# Patient Record
Sex: MALE | Race: WHITE | Employment: FULL TIME | URBAN - METROPOLITAN AREA
[De-identification: names, ages, dates, MRNs, and addresses within clinical notes are randomized per-mention and may not be internally consistent; named-entity substitution may affect disease eponyms.]

---

## 2017-01-03 ENCOUNTER — CARE COORDINATION (OUTPATIENT)
Dept: SURGERY | Facility: CLINIC | Age: 42
End: 2017-01-03

## 2017-01-03 NOTE — PROGRESS NOTES
Returned call to patient regarding voicemail he had left reporting odor during dressing change. No other concerns and he reported in message that incision seemed to be healing well. Unable to reach patient and either number, message left for him to call back and discuss further.     Amanda Magana RN

## 2017-01-04 ENCOUNTER — CARE COORDINATION (OUTPATIENT)
Dept: SURGERY | Facility: CLINIC | Age: 42
End: 2017-01-04

## 2017-01-04 DIAGNOSIS — D3A.8 NEUROENDOCRINE TUMOR (H): Primary | ICD-10-CM

## 2017-01-04 NOTE — PROGRESS NOTES
"Called and left a message for patient to call back.    Called second number listed at home phone and spoke with patient. Overall doing well. No concerns at this time. He states he thinks he over ate yesterday and he felt very full after lunch and didn't eat much the rest of the day. He is feeling well today. Discussed report of \"odor\" coming from wound. He states the nurse had mentioned it and wanted him to let us know, no other concerns noted or report by nurse. Per patient report incision is healing well. No fevers reported. He continues to have reflux but omeprazole BID is continuing to provide relief. Provided refill per Dr. Armenta to patients pharmacy. Informed him we will plan to see him on Monday as scheduled and requested he inform the wound nurse that we will plan on removing the VAC in clinic to further evaluate and request that she come in the afternoon on Monday to possible replace if patient still needs VAC. He verbalized understanding and stated he will inform her today of the plan when she comes to complete the drsg change.     No other concerns at this time and he will call if any arise prior to appointment on Monday.     Amanda Magana RN     "

## 2017-01-05 ENCOUNTER — CARE COORDINATION (OUTPATIENT)
Dept: SURGERY | Facility: CLINIC | Age: 42
End: 2017-01-05

## 2017-01-05 NOTE — PROGRESS NOTES
Called and spoke with patients wife regarding her concerns. Per report she was concerned with one of the medications the patient is on has he has been very emotional over the past week. She states she read the bottle of the reglan medication and was concerned as it stated to call your doctor if you have feeling of depression and sadness, and the patient has had episodes of crying over the past week. Informed her its not unheard of for patients to get emotional after these procedures especially when they are young and normally very active. Informed her I would talk with Dr. Armenta regarding the medication and see if we are able to stop the medication or if he would like to continue if for the remained of the two weeks he as left.     Explained to patients wife the reason on why he is taking the medication as well.     Discussed with  and he would like patient to continue through the weekend until Monday when we are seeing him in clinic.    Called and spoke with patients wife and informed her that we would prefer he continue medication through Monday and that we can address this all when he is in clinic. She agreed with plan and did not have concerns having him continue medication. She also stated that patient had a very good day yesterday.     Amanda Magana RN

## 2017-01-09 ENCOUNTER — OFFICE VISIT (OUTPATIENT)
Dept: SURGERY | Facility: CLINIC | Age: 42
End: 2017-01-09
Attending: SURGERY
Payer: COMMERCIAL

## 2017-01-09 VITALS
OXYGEN SATURATION: 97 % | WEIGHT: 289.02 LBS | SYSTOLIC BLOOD PRESSURE: 121 MMHG | BODY MASS INDEX: 36.13 KG/M2 | RESPIRATION RATE: 16 BRPM | HEART RATE: 84 BPM | TEMPERATURE: 96.8 F | DIASTOLIC BLOOD PRESSURE: 81 MMHG

## 2017-01-09 DIAGNOSIS — D3A.8 NEUROENDOCRINE TUMOR (H): Primary | ICD-10-CM

## 2017-01-09 PROCEDURE — 99212 OFFICE O/P EST SF 10 MIN: CPT | Mod: ZF

## 2017-01-09 ASSESSMENT — PAIN SCALES - GENERAL: PAINLEVEL: MODERATE PAIN (4)

## 2017-01-09 NOTE — Clinical Note
1/9/2017       RE: [place] Guerrero  [address] [facility] [facility] [place] [address]     Dear Colleague,    Thank you for referring your patient, Joey Guerrero, to the [facility]. Please see a copy of my visit note below.    REASON FOR EVALUATION:  Postop Whipple.      HISTORY OF PRESENT ILLNESS:  Mr. 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  He had a Whipple procedure which went well and he essentially had no immediate surgical complications.  However, when his staples were removed prior to discharge, his wound skin opened up which required Wound VAC placement.  He has otherwise done very well.  His final pathology was consistent with pancreatic neuroendocrine tumor measuring 5.1 cm.  It was intermediate grade.  Resection margins were negative and 10 lymph nodes were also all negative.  There were a couple of additional lymph nodes also evaluated, neither of which revealed any evidence of malignancy.  Currently, Joey is doing well.  He is eating and maintaining his weight and actually is gaining some weight.  Therefore, his J tube was removed in clinic today.  His midline incision is essentially healed.  He does have an area of granulation tissue centrally but it is coming along very rapidly with Wound VAC dressing.  We will plan to do the Wound VAC for another week or 2 for completion.  With regard to medical oncology, Mr. Guerrero will be following up with our Medical Oncology group for ongoing surveillance given the large size and moderate grade of his neuroendocrine tumor.  I will plan on seeing Joey in a week or two just to check on his wound, but otherwise, he is doing great.  Also today because he is feeling well, we will start reducing his erythromycin and Reglan to see if we can get him off those now that he is eating better.      A total of 30 minutes was spent with Joey, all of which was in consultation with him and  his wife today.     Sincerely,    Matthias Armenta MD

## 2017-01-09 NOTE — MR AVS SNAPSHOT
After Visit Summary   1/9/2017    Joey Guerrero    MRN: [id number]           Patient Information     Date Of Birth          1975        Visit Information        Provider Department      1/9/2017 9:30 AM Matthias Armenta MD [facility]        Care Instructions    Below is a brief summary of your discussion and care plan from today's visit below.   ______________________________________________________________________    As discussed with Dr. Armenta we will proceed with the following:     - Continue wound vac for 2 weeks. We will see you back on 1/23/2017 at 1115 am.     - Decrease your erythromycin by half. If you feel more nausea start the original dose.     - Decrease the Reglan by half. If you feel more nausea start the original dose.     - Continue for your omeprazole, this medication you will continue for the rest of your life.     - You can stop your lovenox.   ______________________________________________________________________    It was a pleasure seeing you in clinic today - please be in touch if there are any further questions that arise following today's visit.  During business hours, you may reach my Clinic Coordinator at [phone].  For urgent/emergent questions after business hours, you may reach the on-call Surgery Resident by contacting the [facility]  at [phone].    Any benign/non-urgent test results are usually communicated via letter or [employer] message within 1-2 weeks after completion.  Urgent results (those that require a change in the previously-discussed care plan) are usually communicated via a phone call once available from our clinic staff to discuss the results and the next steps in your evaluation.    I recommend signing up for better. access if you have not already done so and are comfortable with using a computer.  This allows for online access to your lab results and also helps you communicate efficiently with my  "clinic should any questions arise in your care.      Sincerely,    Amanda Magana RN  Care Coordinator -   Phone: [phone]  Fax: [phone]              Follow-ups after your visit        Your next 10 appointments already scheduled     2017 11:00 AM   (Arrive by 10:45 AM)   New Patient Visit with Brandan Hanley MD   [facility] ([facility])    [address]  [address] [place] [address] [phone]              Who to contact     If you have questions or need follow up information about today's clinic visit or your schedule please contact [facility] directly at [phone].  Normal or non-critical lab and imaging results will be communicated to you by MyChart, letter or phone within 4 business days after the clinic has received the results. If you do not hear from us within 7 days, please contact the clinic through MyChart or phone. If you have a critical or abnormal lab result, we will notify you by phone as soon as possible.  Submit refill requests through [employer] or call your pharmacy and they will forward the refill request to us. Please allow 3 business days for your refill to be completed.          Additional Information About Your Visit        QranioharPushCall Information     [employer] lets you send messages to your doctor, view your test results, renew your prescriptions, schedule appointments and more. To sign up, go to [email or web address] . Click on \"Log in\" on the left side of the screen, which will take you to the Welcome page. Then click on \"Sign up Now\" on the right side of the page.     You will be asked to enter the access code listed below, as well as some personal information. Please follow the directions to create your username and password.     Your access code is: [email or web address]  Expires: 2017  5:49 PM     Your access code will  in 90 days. If you need help or a new code, please call your " [facility] or [phone].        Care EveryWhere ID     This is your Care EveryWhere ID. This could be used by other organizations to access your [place] medical records  [id number]        Your Vitals Were     Pulse Temperature Respirations Pulse Oximetry          84 96.8  F (36  C) (Oral) 16 97%         Blood Pressure from Last 3 Encounters:   01/09/17 121/81   12/21/16 121/76   12/05/16 124/82    Weight from Last 3 Encounters:   01/09/17 131.1 kg (289 lb 0.4 oz)   12/16/16 124.422 kg (274 lb 4.8 oz)   12/05/16 128.595 kg (283 lb 8 oz)              Today, you had the following     No orders found for display       Primary Care Provider Office Phone # Fax #    Ken Piedra [phone]       [place] [address] [address] [facility] [address]        Thank you!     Thank you for choosing [facility]  for your care. Our goal is always to provide you with excellent care. Hearing back from our patients is one way we can continue to improve our services. Please take a few minutes to complete the written survey that you may receive in the mail after your visit with us. Thank you!             Your Updated Medication List - Protect others around you: Learn how to safely use, store and throw away your medicines at www.disposemymeds.org.          This list is accurate as of: 1/9/17 10:24 AM.  Always use your most recent med list.                   Brand Name Dispense Instructions for use    albuterol 108 (90 BASE) MCG/ACT Inhaler    PROAIR HFA/PROVENTIL HFA/VENTOLIN HFA     Inhale 1-2 puffs into the lungs every 6 hours as needed for shortness of breath / dyspnea or wheezing       calcium carbonate 500 MG chewable tablet    TUMS    150 tablet    Take 1-2 tablets (500-1,000 mg) by mouth 3 times daily as needed for heartburn       cyclobenzaprine 10 MG tablet    FLEXERIL    21 tablet    Take 1 tablet (10 mg) by mouth 3 times daily as needed for muscle spasms       enoxaparin 40  MG/0.4ML injection    LOVENOX    11.2 mL    Inject 0.4 mLs (40 mg) Subcutaneous every 24 hours for 28 days       erythromycin 400 MG/5ML suspension    EES    117 mL    1.3 mLs (104 mg) by Per J Tube route 3 times daily       fluticasone 110 MCG/ACT Inhaler    FLOVENT HFA     Inhale 2 puffs into the lungs At Bedtime       metoclopramide 10 MG tablet    REGLAN    120 tablet    Take 1 tablet (10 mg) by mouth 4 times daily (before meals and nightly)       Multi-vitamin Tabs tablet      Take 1 tablet by mouth every morning       OMEGA-3 FISH OIL PO      Take 1 g by mouth 2 times daily (with meals) 3 capsules       omeprazole 20 MG CR capsule    priLOSEC    90 capsule    Take 1 capsule (20 mg) by mouth 2 times daily       oxyCODONE 5 MG IR tablet    ROXICODONE    30 tablet    Take 1-2 tablets (5-10 mg) by mouth every 4 hours as needed for moderate to severe pain       sucralfate 1 GM/10ML suspension    CARAFATE    1200 mL    Take 10 mLs (1 g) by mouth 4 times daily as needed       VENTOLIN IN      inhale  into the lungs.       vitamin B complex with vitamin C Tabs tablet      Take 1 tablet by mouth every morning       VITAMIN C PO      Take by mouth 2 times daily       VITAMIN D (CHOLECALCIFEROL) PO      Take by mouth every morning

## 2017-01-09 NOTE — PATIENT INSTRUCTIONS
Below is a brief summary of your discussion and care plan from today's visit below.   ______________________________________________________________________    As discussed with Dr. Armenta we will proceed with the following:     - Continue wound vac for 2 weeks. We will see you back on 1/23/2017 at 1115 am.     - Decrease your erythromycin by half. If you feel more nausea start the original dose.     - Decrease the Reglan by half. If you feel more nausea start the original dose.     - Continue for your omeprazole, this medication you will continue for the rest of your life.     - You can stop your lovenox.   ______________________________________________________________________    It was a pleasure seeing you in clinic today - please be in touch if there are any further questions that arise following today's visit.  During business hours, you may reach my Clinic Coordinator at [phone].  For urgent/emergent questions after business hours, you may reach the on-call Surgery Resident by contacting the [facility]  at [phone].    Any benign/non-urgent test results are usually communicated via letter or "[employer]hart message within 1-2 weeks after completion.  Urgent results (those that require a change in the previously-discussed care plan) are usually communicated via a phone call once available from our clinic staff to discuss the results and the next steps in your evaluation.    I recommend signing up for webme access if you have not already done so and are comfortable with using a computer.  This allows for online access to your lab results and also helps you communicate efficiently with my clinic should any questions arise in your care.      Sincerely,    Amanda Magana RN  Care Coordinator -   Phone: [phone]  Fax: [phone]

## 2017-01-09 NOTE — PROGRESS NOTES
REASON FOR EVALUATION:  Postop Whipple.      HISTORY OF PRESENT ILLNESS:  Mr. 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  He had a Whipple procedure which went well and he essentially had no immediate surgical complications.  However, when his staples were removed prior to discharge, his wound skin opened up which required Wound VAC placement.  He has otherwise done very well.  His final pathology was consistent with pancreatic neuroendocrine tumor measuring 5.1 cm.  It was intermediate grade.  Resection margins were negative and 10 lymph nodes were also all negative.  There were a couple of additional lymph nodes also evaluated, neither of which revealed any evidence of malignancy.  Currently, Joey is doing well.  He is eating and maintaining his weight and actually is gaining some weight.  Therefore, his J tube was removed in clinic today.  His midline incision is essentially healed.  He does have an area of granulation tissue centrally but it is coming along very rapidly with Wound VAC dressing.  We will plan to do the Wound VAC for another week or 2 for completion.  With regard to medical oncology, Mr. Guerrero will be following up with our Medical Oncology group for ongoing surveillance given the large size and moderate grade of his neuroendocrine tumor.  I will plan on seeing Joey in a week or two just to check on his wound, but otherwise, he is doing great.  Also today because he is feeling well, we will start reducing his erythromycin and Reglan to see if we can get him off those now that he is eating better.      A total of 30 minutes was spent with Joey, all of which was in consultation with him and his wife today.

## 2017-01-09 NOTE — NURSING NOTE
"Joey Guerrero is a 41 year old male who presents for:  Chief Complaint   Patient presents with     Oncology Clinic Visit     Return for Post-op        Initial Vitals:  /81 mmHg  Pulse 84  Temp(Src) 96.8  F (36  C) (Oral)  Resp 16  Wt 131.1 kg (289 lb 0.4 oz)  SpO2 97% Estimated body mass index is 36.13 kg/(m^2) as calculated from the following:    Height as of 12/8/16: 1.905 m (6' 3\").    Weight as of this encounter: 131.1 kg (289 lb 0.4 oz).. There is no height on file to calculate BSA. BP completed using cuff size: large  Moderate Pain (4) No LMP for male patient. Allergies and medications reviewed.     Medications: Medication refills not needed today.  Pharmacy name entered into [employer]:    [facility] [address] [place], [place] - [address] [facility] [address] [place], [place] - [address] [address] [facility] [address]    Comments:     5  minutes for nursing intake (face to face time)   Suze Mcgill MA          "

## 2017-01-18 ENCOUNTER — CARE COORDINATION (OUTPATIENT)
Dept: SURGERY | Facility: CLINIC | Age: 42
End: 2017-01-18

## 2017-01-18 NOTE — PROGRESS NOTES
Called and spoke with patient regarding questions. He reports he has stopped the reglan and the erythromycin as well as the Carafate. He states he has notice a dull pain in his abdomen since stopping medications. He states food seems to be moving through fine and he is not having any issues with nausea. Informed him to restart the Carafate for now to see if that helps as he had bad reflux following surgery. Informed him we can further assess at his visit on Monday if pain does not improve. He agreed with plan and has no further questions.     Amanda Magana RN

## 2017-01-23 ENCOUNTER — OFFICE VISIT (OUTPATIENT)
Dept: SURGERY | Facility: CLINIC | Age: 42
End: 2017-01-23
Attending: SURGERY
Payer: COMMERCIAL

## 2017-01-23 VITALS
TEMPERATURE: 97.9 F | HEIGHT: 75 IN | OXYGEN SATURATION: 97 % | BODY MASS INDEX: 30.96 KG/M2 | HEART RATE: 65 BPM | RESPIRATION RATE: 20 BRPM | DIASTOLIC BLOOD PRESSURE: 80 MMHG | SYSTOLIC BLOOD PRESSURE: 117 MMHG | WEIGHT: 249 LBS

## 2017-01-23 DIAGNOSIS — D3A.8 NEUROENDOCRINE TUMOR (H): Primary | ICD-10-CM

## 2017-01-23 PROCEDURE — 99212 OFFICE O/P EST SF 10 MIN: CPT

## 2017-01-23 NOTE — Clinical Note
Can you schedule this patient for follow up with wilder on 2/20 and call him with appointment info.   Thanks,  Amanda

## 2017-01-23 NOTE — PROGRESS NOTES
Looks great  Incision healing well  Small area of granulation tissue remains - telfa and ointment placed - no need for vac any more  Doing wel with diet but occasionally having bloating/pain if eats too much - counselled on 4-6 small meals per day    FU in 1 month to be sure of healing and diet improvement

## 2017-01-23 NOTE — Clinical Note
1/23/2017       RE: Joey Guerrero  [address] [place] [address]     Dear Colleague,    Thank you for referring your patient, Joey Guerrero, to the [facility]. Please see a copy of my visit note below.    Looks great  Incision healing well  Small area of granulation tissue remains - telfa and ointment placed - no need for vac any more  Doing wel with diet but occasionally having bloating/pain if eats too much - counselled on 4-6 small meals per day    FU in 1 month to be sure of healing and diet improvement    Again, thank you for allowing me to participate in the care of your patient.      Sincerely,    Matthias Armenta MD

## 2017-01-23 NOTE — Clinical Note
[facility]  [address]  [address]  [facility] [place] [address]  [phone]        January 23, 2017    Regarding:  Joey Guerrero  [address] [place] [address]              To Whom It May Concern;    Mr. Guerrero has been under my professional care following surgical intervention on 12/8/2016. Mr. Guerrero was seen in the office today, 1/23/2016, for continued follow up after surgery. Due to post op healing complications he is unable to return to work on light duty at this time. It is my recommendation that he doesn't return to work until he has completely recovered and is able to return full time.     We will continue to follow up with Mr. Guerrero throughout this recovery to ensure he is ready and it is safe for him to return to work.     If you have any questions or need additional information or paperwork please contact my nurse Amanda at [phone] or fax paperwork to [phone].         Sincerely,        Matthias Armenta MD

## 2017-01-23 NOTE — NURSING NOTE
"Joey Guerrero is a 41 year old male who presents for:  Chief Complaint   Patient presents with     Oncology Clinic Visit     FU post op appointment        Initial Vitals:  /80 mmHg  Pulse 65  Temp(Src) 97.9  F (36.6  C) (Oral)  Resp 20  Ht 1.905 m (6' 3\")  Wt 112.946 kg (249 lb)  BMI 31.12 kg/m2  SpO2 97% Estimated body mass index is 31.12 kg/(m^2) as calculated from the following:    Height as of this encounter: 1.905 m (6' 3\").    Weight as of this encounter: 112.946 kg (249 lb).. Body surface area is 2.44 meters squared. BP completed using cuff size: large  Data Unavailable No LMP for male patient. Allergies and medications reviewed.     Medications: Medication refills not needed today.  Pharmacy name entered into [employer]:    [facility] [address] [place], [place] - [address] [facility] [address] [place], [place] - [address] [address] AT [facility] [address]    Comments:     6 minutes for nursing intake (face to face time)   Roxy Lainez CMA          "

## 2017-01-27 ENCOUNTER — DOCUMENTATION ONLY (OUTPATIENT)
Dept: CARE COORDINATION | Facility: CLINIC | Age: 42
End: 2017-01-27

## 2017-01-28 NOTE — PROGRESS NOTES
[facility] now requests orders and shares plan of care/discharge summaries for some patients through [employer].  Please REPLY TO THIS MESSAGE in order to give authorization for orders when needed.  This is considered a verbal order, you will still receive a faxed copy of orders for signature.  Thank you for your assistance in improving collaboration for our patients.    DISCHARGE SUMMARY  Discharge Summary from 1/27/17  Most Goals Met  Patient Status Improved 1. Independent with ADLs and most IADLs. Patient is driving to shops close to                                                home                                      2. Able to manage own wound cares                                      3. Wound VAC and PEG tube discontinued                                       3. Maintains full liquid, blander diet, introducing foods back into diet slowly                                      4. Understands purpose and S/E of medications. Patient able to administer on own                                      5. No falls reported. Gait is steady                                      6. Independent with self care management and verbalizes the abillity to have a life                                               style to manage disease process    Discharge instructions given to patient    Medication schedule to remain as directed    Taught 1. Nutrition and hydration High protein diet to assist with wound healing, introduction to foods                      slowly             2. Home safety including safety with ADLs/IADLs             3. Wound cares and assessments and what to do if observe abnormal finding             4. Emergency care plan and pertinent phone numbers to be kept near telephone    Follow up with Oncology 2/6/17 and Surgeon March 2017  Physician notified of discharge

## 2017-02-06 ENCOUNTER — ONCOLOGY VISIT (OUTPATIENT)
Dept: ONCOLOGY | Facility: CLINIC | Age: 42
End: 2017-02-06
Attending: INTERNAL MEDICINE
Payer: COMMERCIAL

## 2017-02-06 VITALS
BODY MASS INDEX: 32.26 KG/M2 | RESPIRATION RATE: 12 BRPM | HEART RATE: 81 BPM | SYSTOLIC BLOOD PRESSURE: 122 MMHG | HEIGHT: 74 IN | TEMPERATURE: 97.5 F | WEIGHT: 251.4 LBS | OXYGEN SATURATION: 96 % | DIASTOLIC BLOOD PRESSURE: 86 MMHG

## 2017-02-06 DIAGNOSIS — D13.7 INSULINOMA: Primary | ICD-10-CM

## 2017-02-06 PROCEDURE — 99205 OFFICE O/P NEW HI 60 MIN: CPT | Mod: ZP | Performed by: INTERNAL MEDICINE

## 2017-02-06 PROCEDURE — 99358 PROLONG SERVICE W/O CONTACT: CPT | Performed by: INTERNAL MEDICINE

## 2017-02-06 PROCEDURE — 99212 OFFICE O/P EST SF 10 MIN: CPT

## 2017-02-06 ASSESSMENT — PAIN SCALES - GENERAL: PAINLEVEL: MILD PAIN (3)

## 2017-02-06 NOTE — PROGRESS NOTES
NEW PATIENT VISIT    NAME: Joey Guerrero      DATE: 02/06/2017    MRN: [id number]  REFERRING PHYSICIAN: Dr. Geovany Piedra (PCP)  CC/PATIENT ID: Newly diagnosed insulinoma, s/p whipple  HPI:  Mr. 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  He states that about 3 years ago he started to have symptoms of confusion and vague neurologic auras.  These were worse after eating. He was initially told that he had migraines but did not have improvement with migraine treatment.  He tells me that he has a relative who is family physician who recommend that he have an endocrinology evaluation.  He eventually was referred to an endocrinologist through [facility]. He was initially told that he had reactive hypoglycemia.  Testing showed he had elevated insulin levels so MRI abdomen was performed which found to have a tumor on the head of the pancreas.  Biopsy showed this to be a neuroendocrine tumor.  Pre-operative octreotide scan did not show any uptake.    On 12/8/2016,  he underwent a Whipple resection by Dr. Armenta. He had negative margins and no evidence of metastasis. Pathology is below.  He comes in today by himself. He reports that he is still having a fair amount of pain since the surgery in his abdomen.  This is fairly constant but is much worse after eating.  He feels that this is somewhat improving though still bothersome. He denies nausea/vomiting.  He has not had any further episodes of symptoms of hypoglycemia.    PAST MEDICAL HISTORY:  Past Medical History   Diagnosis Date     Unspecified asthma(493.90)      Pancreatic tumor      Uncomplicated asthma      Migraine      Tinea versicolor      Hypoglycemia      Ruptured appendix        PAST SURGICAL HISTORY:     Past Surgical History   Procedure Laterality Date     Picc insertion       Appendectomy       Endoscopic ultrasound upper  gastrointestinal tract (gi) N/A 11/18/2016     Procedure: ENDOSCOPIC ULTRASOUND, ESOPHAGOSCOPY / UPPER GASTROINTESTINAL TRACT (GI);  Surgeon: Guru Malathi Canas MD;  Location:  OR     Whipple procedure N/A 12/8/2016     Procedure: WHIPPLE PROCEDURE;  Surgeon: Matthias Armenta MD;  Location:  OR       FAMILY HISTORY:  He denies any family history of malignancy. He states his sister has fibromyalgia but is otherwise healthy.  SH: He lives in [place], [place]. He works as a [employer]. He is . He has 2 boys ages 4 and 8. He chewed tobacco up until his surgery he stopped this. He rarely smoked at a younger age. He previously drank alcohol socially but has not for some time as this has worsened his symptoms.  He denies other drug use.          MEDS:        Current Outpatient Prescriptions on File Prior to Visit:  omeprazole (PRILOSEC) 20 MG CR capsule Take 1 capsule (20 mg) by mouth 2 times daily   calcium carbonate (TUMS) 500 MG chewable tablet Take 1-2 tablets (500-1,000 mg) by mouth 3 times daily as needed for heartburn   Ascorbic Acid (VITAMIN C PO) Take by mouth 2 times daily    albuterol (PROAIR HFA, PROVENTIL HFA, VENTOLIN HFA) 108 (90 BASE) MCG/ACT inhaler Inhale 1-2 puffs into the lungs every 6 hours as needed for shortness of breath / dyspnea or wheezing   fluticasone (FLOVENT HFA) 110 MCG/ACT inhaler Inhale 2 puffs into the lungs At Bedtime    multivitamin, therapeutic with minerals (MULTI-VITAMIN) TABS Take 1 tablet by mouth every morning    Omega-3 Fatty Acids (OMEGA-3 FISH OIL PO) Take 1 g by mouth 2 times daily (with meals) 3 capsules   Albuterol Sulfate (VENTOLIN IN) inhale  into the lungs.     No current facility-administered medications on file prior to visit.        ALLERGIES:     Allergies   Allergen Reactions     Pollen Extract      Other reaction(s): Runny Nose     Cat Hair Extract      Other reaction(s): Runny Nose     ROS: FULL 10-PT ROS performed, pertinent positives  "are noted above.  PHYSICAL EXAM:  VITALS /86 mmHg  Pulse 81  Temp(Src) 97.5  F (36.4  C) (Oral)  Resp 12  Ht 1.878 m (6' 1.94\")  Wt 114.034 kg (251 lb 6.4 oz)  BMI 32.33 kg/m2  SpO2 96%  KPS: 90%  Gen: alert and oriented x 3. NAD.  Eyes: ancicteric  Mouth: clear  Neck: supple  Heart: RRR. No mgr.  Lungs: clear  Abd: well healed surgical scar, no sign of infection, slightly tender over the scar, no masses, BS+  Lower ext: well perfused, no edema  Skin: no rash  Neuro: grossly non-focal  LABS REVIEWED ON DAY OF VISIT  Labs from  (Dr. Pruitt)  Called to review 72 hour fast results.   (glucose) Dropped to 49 and later to 41 at 0900 on 10/16.   Insulin 2.2, 3.5  C-peptide 0.7, 0.7   Pro-insulin <7.5, 9.6  Beta-hydroxybutarate 0.31, 0.55    Change after glucagon 45 points. He did drop from 86 to 67 before d/c.       Dr. Pruitt on 11/3:  Testing for prolactin and iPTH normal, no MEN 1.    RADIOLOGY:          MRI Abdomen 10/31/2016  Impression:   1. Enhancing 3.9 cm mass in the pancreas with central necrosis. Given   the patient's history, this would be most suspicious for a newer   endocrine tumor such as insulinoma. This mass encases approximately   50% of the adjacent SMV, which remains patent. No other vascular   involvement.  2. Remainder of the exam is negative.    Octreotide scan 12/1/2016  Impression:     The 3D fused images as well as whole-body images demonstrates normal  distribution of the octreotide. There is no evidence of metastatic  disease or tumor. Of note, the known pancreatic malignancy does not  demonstrate radiotracer uptake.      Pathology       Copath Report     Patient Name: YESENIA TORRES   MR#: [id number]   Specimen #: [id number]   Collected: 12/8/2016   Received: 12/8/2016   Reported: 12/14/2016 12:11   Ordering Phy(s): LUKAS LOZA     For improved result formatting, select 'View Enhanced Report Format'   under Linked Documents section.     SPECIMEN(S):   A: Gallbladder   B: " Whipple   C: Duodenum   D: Lymph node, portal   E: Distal stomach     FINAL DIAGNOSIS:   A. GALLBLADDER, CHOLECYSTECTOMY:   - Gallbladder with no diagnostic abnormality     B. PANCREAS, DUODENUM, WHIPPLE RESECTION:   - Pancreatic neuroendocrine tumor (5.1 cm), intermediate-grade (G2)   - Surgical resection margins: Negative (tumor is 0.1 cm from the SMV   groove and 0.3 cm to uncinate margins)   - Ten lymph nodes with no evidence of metastasis (0/10)     C. DUODENUM, PARTIAL RESECTION:   - Segment of small bowel with extensive mucosal necrosis   - Resection margins: one with extensive necrosis and one with serositis   only     D. LYMPH NODE, PORTAL, BIOPSY:   - One lymph node with no evidence of metastatic tumor (0/1).     E. STOMACH, DISTAL, PARTIAL RESECTION:   - Segment of stomach with focal acute serositis   - One lymph node with no evidence of metastatic tumor(0/1)     COMMENT:   Immunohistochemical staining for Ki-67 is performed with appropriate   control. The proliferation index in some areas of tumor is upto 5% which   supports the above diagnosis.     Report Name: Pancreas NET   Status: Submitted     Part(s) Involved:   B: Ann     Synoptic Report:     CLINICAL     Clinical History:         - Hypoglycemic syndrome     Functional Type:         - Pancreatic neuroendocrine tumor, functional     SPECIMEN     Specimen:         - Head of pancreas     Procedure:         - Pancreaticoduodenectomy (Whipple resection), partial   pancreatectomy     TUMOR     Primary Tumor Site:         - Pancreatic head     Histologic Type and Grade:         - Well-differentiated neuroendocrine tumor; G2: Intermediate grade     Tumor Size: 5.1 cm     Tumor Focality:         - Unifocal     Tumor Extent       Sites(s) of Direct Extent of Tumor:           - Uncinate process       Microscopic Tumor Extension:           - Tumor is confined to pancreas     Accessory Tumor Findings       Mitotic Rate:           - 2-20 mitoses / 10 HPF  "        Mitoses Per 10 HPF: 4       Lymph-Vascular Invasion:           - Not identified       Perineural Invasion:           - Not identified     MARGINS     Margin Summary: All margins uninvolved by tumor       Distance of Tumor From Closest Margin: 1 mm       Margin: SMV groove margin     Pancreatic Neck / Parenchymal Margin:         - Uninvolved by tumor     Uncinate (Retroperitoneal / Superior Mesenteric Artery) Margin:         - Uninvolved by tumor     Bile Duct Margin:         - Uninvolved by tumor     Proximal Margin (Gastric or Duodenal):         - Uninvolved by tumor     Distal Margin (Distal Duodenal or Jejunal):         - Uninvolved by tumor     LYMPH NODES     Number of Lymph Nodes Examined: 12     Lymph Node Involvement: None identified     STAGE (PTNM)     Primary Tumor (pT):         - pT2: Tumor limited to the pancreas, more than 2 cm in greatest   dimension     Regional Lymph Nodes (pN):         - pN0: No regional lymph node metastasis     CAP eCC March 2016 Annual Release     I have personally reviewed all specimens and or slides, including the   listed special stains, and used them with my medical judgement to   determine the final diagnosis.     Electronically signed out by:     Marjan Vallecillo M.D., [facility]     CLINICAL HISTORY:   41-year-old white  with h/o uncomplicated asthma, migraine   is being evaluated for new onset hypoglycemia and a 4 cm pancreatic mass   suspicious for neuroendocrine tumor of the pancreas.     GROSS:   A: The specimen is received in formalin with proper patient   identification, labeled \"gallbladder\".  The specimen consists of a 12.7   cm in length intact gallbladder that ranges in diameter from 2.7 cm at   the neck to 5.3 cm at the fundus.  The serosa is green-gray, smooth, and   glistening.  There is a moderate amount of green-brown viscous bile.  No   choleliths identified within the container or the specimen.  The mucosa   is green-yellow, velvety, " "and has an average wall thickness of 0.1 cm.   No masses are grossly identified.  Representative sections, including en   face cystic duct margin, are submitted in cassette A1.     B: The specimen is received fresh with proper patient identification,   labeled \"Whipple\".  The specimen consists of pancreatoduodenectomy to   include a pancreas (8.1 x 7.8 x 4.5 cm) and  duodenum (9.0 cm length x   4.1 cm in diameter).  The anterior resection margin is inked yellow, the   posterior margin is inked black, the bile duct margin is inked green,   the pancreatic neck margin is inked red, the SMV groove is inked blue,   and uncinate is inked orange.     Upon sectioning, there is a 5.1 x 4.2 x 2.6 cm tan-brown, soft, friable,   and well-defined mass.  The mass abuts the anterior resection margin and   abuts the SMV groove.  The mass is 0.3 cm from the nearest uncinate   margin, 1.1 cm from the pancreatic neck margin, 2.1 cm from the   posterior, 2.4 cm from the bile duct margin, 6.5 cm from the proximal   duodenal margin, and 9.2 cm from the distal duodenal resection margin.   No other masses are grossly identified.  The remaining pancreatic   parenchyma is tan-yellow, lobulated, and unremarkable.  The duodenal   mucosa is tan-brown with regular intestinal folds.  No masses are   grossly identified.  Five lymph nodes that range from 0.5-1.1 cm   greatest dimension are identified. Representative sections are   submitted.     Summary of Sections:   B1 - pancreatic neck margin, frozen section remnant   B2 - bile duct margin, frozen section remnant   B3 - mass to anterior resection margin   B4 - mass to pancreatic neck margin   B5 - mass to SMV groove   B6 - mass to uncinate margin   B7 - mass   B8 - grossly unremarkable parenchyma   B9 - proximal duodenal resection margin   B10 - distal duodenal resection margin   B11 - duodenal mucosa   B12-B13 - duodenal fibroadipose tissue   B14 - two intact anterior lymph nodes   B15 - three " "intact posterior lymph nodes     C: The specimen is received in formalin with proper patient   identification, labeled \"duodenum\".  The specimen consists of 19.5 cm   length x 3.7 cm in diameter portion of small bowel.  At one end of the   specimen, the serosa is tan-gray and slightly ragged.  The remaining   serosa is grey-green and dusky.  At the ragged and of the specimen,   there is a thin strip of tan-pink grossly unremarkable the os.  The   remaining mucosa is gray-green and dusky.  No masses, ulcerations, or   polyps are grossly identified.  Representative sections are submitted.     Summary of Sections:   C1 - ragged resection margin   C2 - opposite resection margin   C3 - mucosa     D: The specimen is received in formalin with proper patient   identification, labeled \"portal lymph node\".  The specimen consists of a   single 1.9 x 1.2 x 0.8 cm lymph node is identified.  The lymph node is   entirely submitted in cassette D1.     E: The specimen is received in formalin with proper patient   identification, labeled \"distal stomach\".  The specimen consists of a   7.2 x 2.5 x 1.7 and partial gastrectomy with attached (10.7 x 6.0 x 1.9   cm) unremarkable adipose tissue.  The serosa is tan-pink, smooth, and   glistening.  The mucosa is tan-pink and unremarkable.  No masses are   grossly identified.  A single 1.0 x 0.6 x 0.3 cm lymph node is   identified.  Representative sections are submitted.     Summary of Sections:   E1 - resection margin   E2 - opposite end resection margin   E3 - mucosa   E4 - one intact lymph node (Dictated by: Shoshana Fernandez 12/9/2016   04:37 PM)     INTRAOPERATIVE CONSULTATION:   BFS1: Pancreatic neck margin:   \"No evidence of carcinoma\"     BFS2: Bile duct margin:   \"No evidence of carcinoma\" (Marjan Vallecillo M.D.)     MICROSCOPIC:   Microscopic examination is performed.     CPT Codes:   A: 38538-YW8   B: 90494-NR2, 25985-UU, 83177-MGQ, 60575-RCO   C: 22204-GK4   D: 69446-VS1   E: 66354-XB6 "     TESTING LAB LOCATION:   [facility], [address]   [id number] [phone]     COLLECTION SITE:   Client: [facility]   Location: [facility])          IMPRESSION/PLAN:  1. 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 We reviewed with him that surgery is generally curative of this disease but we still need to have close surveillance for recurrence. We reviewed the pathology with him in great detail and explained that he overall has intermediate risk factors - though this overall has a good prognosis. He would like to transfer his care to the [facility]. We will make a referral to our endocrinology team.  He asked about the etiology of the tumor. We explained that a subset of these are associated with genetic syndromes though many of them are not hereditary. We offered him a referral to genetics as this may have implications on his children - though would not alter our approach to his current treatment.  We will plan on having him come back in about 4 months with a repeat MRI and labs.  There is no clear guidance on the need for following hormonal levels.  We will see if our endocrinology colleagues have any input into this.  We will plan on using chromagranin A screening and unless directed otherwise will only repeat insulin levels if he has recurrent symptoms.  During the first year we will plan on seeing  him every 4 months.  We will then transition to every 6 months and then likely yearly over the next several years.  He asked about the post-operative pain and symptoms he is having. I am not sure if this will continue to improve with healing. We recommend that he do a trial of Creon in the case this might help.  If he does not have improvement with a week of this then he could stop and discuss this further with  Dr. Armenta about what to expect post-operatively.      Treatment plan:  - surveillance, MRI/labs in 4 months  - Genetics if desired  - trial of Creon  RTC in: 4 months    I spent > 60 minutes in consultation including history, physical, and 45 minutes of discussion.   Over 50% of the time was spent counseling and coordinating care.  I spent 60 minutes/hours reviewing the patient s medical records, images, imaging reports, and outside clinic records and lab values.   Patient was seen with and plan of care developed with Dr. Hanley.    Von Menjivar MD  Heme/Onc Fellow

## 2017-02-06 NOTE — Clinical Note
2/6/2017       RE: Joey Guerrero  [address]  [facility] [address]     Dear Colleague,    Thank you for referring your patient, Joey Guerrero, to the [facility]. Please see a copy of my visit note below.    MEDICAL ONCOLOGY NEW PATIENT VISIT NOTE      REFERRING PHYSICIAN:  Dr. Matthias Armenta, Hepatobiliary Surgery, [facility].      PATIENT ID:  [id number] Pancreatic head insulinoma.  No evidence of metastatic disease.      HISTORY OF PRESENT ILLNESS:  Mr. Joey Guerrero is a 41-year-old  gentleman from [place], [place].  He was recently diagnosed with pancreatic head insulinoma.      Over a 2-3 year period, beginning in 2014 or so, he was having hypoglycemic episodes of unexplained origin.  He was even hospitalized for one such episode in the fall of 2016.  He met with a primary care physician, and subsequently an endocrinologist.  There was concern for hypoglycemic cause and concern for potential insulinoma.  On 10/31/2016, MRI at [facility] revealed a 3.9 x 3.7 x 3.6 cm mass in the head of the pancreas.  There was involvement of the superior mesenteric vein, but no evidence of metastatic disease noted.  He met with Dr. Migel Sena at [facility], and a referral was made for surgical evaluation.      On 11/11/2016, a CT abdomen and pelvis was performed revealing an enhancing lesion with a cystic component versus central necrosis in the head and the uncinate process of the pancreas.  There was an indeterminate portacaval lymph node measuring 2.1 cm.  Otherwise, no evidence of suspicious adenopathy in the abdomen.  EUS was performed by Dr. Guru Canas at the [facility] on 11/18/2016.  He performed an FNA.  This revealed the tumor was composed of grade 1 neuroendocrine tumor with a low proliferation marker of Ki-67 less than 2%.  The tumor was positive for synaptophysin, chromogranin and CD56.      On 12/01/2016, the patient underwent a preoperative  octreotide scan that did not show any evidence of metastatic disease or tumor.  There was no radiotracer uptake.  A baseline chromogranin A was 70.  On 12/05/2016, the patient met with Dr. Matthias Armenta, who took the patient to the operating room on 12/08/2016 to perform a Whipple.  The pancreaticoduodenectomy resulted in the revealing of a pancreatic neuroendocrine tumor at the head of the pancreas that measured 5.1 cm with intermediate grade G2.  Margins were negative by 0.1 cm.  Zero of 12 lymph nodes were involved.  The Ki-67 registered at 5% with 4 mitoses/10 high-power fields.  There was no perivascular, nor perineural invasion.  The pathologic stage was T2.  The pathologic N stage was N0, and M0 per the prior octreotide scan.  The patient is currently 9 weeks out from surgery.      He presents today alone for evaluation upon the kind referral of Dr. Armenta and also Dr. Canas.  He characterizes epigastric and stomach pain that is worsened upon eating.  He has not been on Creon supplementation thus yet.  He otherwise is doing well.  He has not yet returned to his job as a  in [place].      PAST MEDICAL HISTORY:  Pancreatic neuroendocrine tumor, pathologic T2 N0 M0 per above, status post Whipple surgery on 12/08/2016.  Other surgeries notable include an appendectomy in 2007.      FAMILY HISTORY:  Denies any family history of malignancy.      SOCIAL HISTORY:  The patient lives in [place], [place], with his wife.  They have 2 boys, ages 8 and 4.  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    Tobacco:  Used to chew and smoke occasional cigarettes since the age of 18 until recently.    Alcohol:  Denies alcohol abuse.    Drugs:  Denies illicit drug use.      MEDICATIONS:  Fully reviewed in Epic.   Current Outpatient Prescriptions   Medication     amylase-lipase-protease (CREON 6) 6000 UNITS CPEP     omeprazole (PRILOSEC) 20 MG CR capsule     calcium carbonate (TUMS) 500 MG  "chewable tablet     Ascorbic Acid (VITAMIN C PO)     albuterol (PROAIR HFA, PROVENTIL HFA, VENTOLIN HFA) 108 (90 BASE) MCG/ACT inhaler     fluticasone (FLOVENT HFA) 110 MCG/ACT inhaler     multivitamin, therapeutic with minerals (MULTI-VITAMIN) TABS     Omega-3 Fatty Acids (OMEGA-3 FISH OIL PO)     Albuterol Sulfate (VENTOLIN IN)     No current facility-administered medications for this visit.          ALLERGIES:  No known drug allergies.      REVIEW OF SYSTEMS:  Full 10-point review of systems was performed.  Pertinent symptoms are reviewed above per HPI.      PHYSICAL EXAM:   /86 mmHg  Pulse 81  Temp(Src) 97.5  F (36.4  C) (Oral)  Resp 12  Ht 1.878 m (6' 1.94\")  Wt 114.034 kg (251 lb 6.4 oz)  BMI 32.33 kg/m2  SpO2 96%    KPS:  90%.   GENERAL:  Very pleasant young adult  gentleman, in no acute distress, alert and oriented x3.   HEENT:  Anicteric sclerae. PERRLA, oropharynx clear with no mucositis or thrush.   LYMPH NODES:  No palpable pre/post-auricular, cervical, axillary, or inguinal lymphadenopathy appreciated.   CV:  RRR, normal S1 S2.  No murmurs, gallops, or rubs.   LUNGS:  Clear to auscultation bilaterally.  No dullness to percussion.   ABDOMEN:  Soft, nontender and nondistended.  Bowel sounds heard x4.  No apparent hepatosplenomegaly.    Broad/long midline scar.  No overlying fluctuance, erythema or tenderness, healing well.   EXTREMITIES:  No clubbing, cyanosis, or edema.   NEUROLOGIC:  Cranial Nerves II-XII grossly intact.  Motor strength and sensation grossly intact.       LABORATORY DATA:  Reviewed on the day of visit, including the preoperative serum chromogranin A of 70 on 12/06/2016.   Results for orders placed or performed during the hospital encounter of 12/08/16   XR Abdomen Port 1 View    Narrative    EXAM: XR ABDOMEN PORT F1 VW  12/11/2016 1:14 PM      HISTORY: assess NG position    COMPARISON: CT 11/11/2016    FINDINGS: Gastric tube sidehole projects just above " the  gastroesophageal junction with the tip projecting over the fundus.  Biliary and pancreatic stents. Surgical drain in the right upper  quadrant. Scattered surgical clips and midline staples. Percutaneous  jejunostomy tube. No abnormally dilated air-filled loops of large or  small bowel. No acute osseous and amounted.       Impression    IMPRESSION: Gastric tube sidehole projects just above the  gastroesophageal junction. Consider advancing.    I have personally reviewed the examination and initial interpretation  and I agree with the findings.    LUKAS MARX MD   XR Abdomen Port 1 View   Result Value Ref Range    Radiologist flags Nasogastric tube tip position (Urgent)     Narrative    Examination: XR ABDOMEN PORT F1 VW, 12/12/2016 11:08 AM    Comparison: 12/11/2016    History: assess NG tube position, displaced    Findings: Nasogastric tube has been pulled back, tip at the level of  the distal esophagus. Postoperative changes of Whipple procedure.  Percutaneous jejunostomy tube, surgical drain and biliary/pancreatic  stents appears stable in position. Surgical clips. Nonobstructive  bowel gas pattern. No pneumatosis, portal venous gas or gross free air  within the limits of this supine radiograph.      Impression    Impression:   1. Nasogastric tube has been pulled back, tip now at the level of the  distal esophagus. Recommended advancing.  2. Lines and tubes otherwise stable.    [Access Center: Nasogastric tube tip position]    This report will be copied to the [facility] to ensure a  provider is contacted. Access Center is available Monday through  Friday 8am-3:30 pm.     I have personally reviewed the examination and initial interpretation  and I agree with the findings.    ALIRIO AGARWAL   XR Abdomen Port 1 View    Narrative    XR ABDOMEN PORT F1 VW  12/19/2016 5:01 PM      HISTORY: nausea and vomiting    COMPARISON: 12/12/2016    FINDINGS: Postsurgical changes of Whipple procedure.  Redemonstration  of jejunostomy tube, biliary and pancreatic drains. Bowel loops are  not distended. Paucity of small bowel gas in the abdomen. Overall  nonobstructive bowel gas pattern. No pneumatosis or portal venous gas.  Small amount of stool in the colon.       Impression    IMPRESSION: Nonobstructive bowel gas pattern.    I have personally reviewed the examination and initial interpretation  and I agree with the findings.    KATE ANN MD   Arterial Panel   Result Value Ref Range    pH Arterial 7.35 7.35 - 7.45 pH    pCO2 Arterial 46 (H) 35 - 45 mm Hg    pO2 Arterial 156 (H) 80 - 105 mm Hg    Bicarbonate Arterial 25 21 - 28 mmol/L    Base Deficit Art 0.7 mmol/L    FIO2 44.0     Sodium 136 133 - 144 mmol/L    Potassium 4.0 3.4 - 5.3 mmol/L    Hemoglobin 13.0 (L) 13.3 - 17.7 g/dL    Glucose 89 70 - 99 mg/dL    Calcium Ionized Whole Blood 4.9 4.4 - 5.2 mg/dL   Arterial Panel   Result Value Ref Range    pH Arterial 7.38 7.35 - 7.45 pH    pCO2 Arterial 41 35 - 45 mm Hg    pO2 Arterial 143 (H) 80 - 105 mm Hg    Bicarbonate Arterial 24 21 - 28 mmol/L    Base Deficit Art 0.9 mmol/L    FIO2 43.0     Sodium 134 133 - 144 mmol/L    Potassium 4.3 3.4 - 5.3 mmol/L    Hemoglobin 13.1 (L) 13.3 - 17.7 g/dL    Glucose 103 (H) 70 - 99 mg/dL    Calcium Ionized Whole Blood 4.9 4.4 - 5.2 mg/dL   Glucose by meter   Result Value Ref Range    Glucose 80 70 - 99 mg/dL   Arterial Panel   Result Value Ref Range    pH Arterial 7.38 7.35 - 7.45 pH    pCO2 Arterial 41 35 - 45 mm Hg    pO2 Arterial 156 (H) 80 - 105 mm Hg    Bicarbonate Arterial 24 21 - 28 mmol/L    Base Deficit Art 0.9 mmol/L    FIO2 41.0     Sodium 134 133 - 144 mmol/L    Potassium 4.5 3.4 - 5.3 mmol/L    Hemoglobin 13.1 (L) 13.3 - 17.7 g/dL    Glucose 115 (H) 70 - 99 mg/dL    Calcium Ionized Whole Blood 4.8 4.4 - 5.2 mg/dL   Arterial Panel   Result Value Ref Range    pH Arterial 7.38 7.35 - 7.45 pH    pCO2 Arterial 41 35 - 45 mm Hg    pO2 Arterial 167 (H) 80 - 105 mm  Hg    Bicarbonate Arterial 24 21 - 28 mmol/L    Base Deficit Art 1.0 mmol/L    FIO2 41.0     Sodium 134 133 - 144 mmol/L    Potassium 4.6 3.4 - 5.3 mmol/L    Hemoglobin 13.2 (L) 13.3 - 17.7 g/dL    Glucose 117 (H) 70 - 99 mg/dL    Calcium Ionized Whole Blood 4.8 4.4 - 5.2 mg/dL   Arterial Panel   Result Value Ref Range    pH Arterial 7.37 7.35 - 7.45 pH    pCO2 Arterial 41 35 - 45 mm Hg    pO2 Arterial 168 (H) 80 - 105 mm Hg    Bicarbonate Arterial 24 21 - 28 mmol/L    Base Deficit Art 1.3 mmol/L    FIO2 41.0     Sodium 133 133 - 144 mmol/L    Potassium 4.8 3.4 - 5.3 mmol/L    Hemoglobin 13.1 (L) 13.3 - 17.7 g/dL    Glucose 123 (H) 70 - 99 mg/dL    Calcium Ionized Whole Blood 4.8 4.4 - 5.2 mg/dL   Glucose by meter   Result Value Ref Range    Glucose 130 (H) 70 - 99 mg/dL   Glucose by meter   Result Value Ref Range    Glucose 131 (H) 70 - 99 mg/dL   Glucose by meter   Result Value Ref Range    Glucose 141 (H) 70 - 99 mg/dL   Glucose by meter   Result Value Ref Range    Glucose 142 (H) 70 - 99 mg/dL   Glucose by meter   Result Value Ref Range    Glucose 141 (H) 70 - 99 mg/dL   Glucose by meter   Result Value Ref Range    Glucose 126 (H) 70 - 99 mg/dL   Glucose by meter   Result Value Ref Range    Glucose 126 (H) 70 - 99 mg/dL   Arterial Panel   Result Value Ref Range    pH Arterial 7.40 7.35 - 7.45 pH    pCO2 Arterial 37 35 - 45 mm Hg    pO2 Arterial 162 (H) 80 - 105 mm Hg    Bicarbonate Arterial 23 21 - 28 mmol/L    Base Deficit Art 1.8 mmol/L    FIO2 39     Sodium 132 (L) 133 - 144 mmol/L    Potassium 4.3 3.4 - 5.3 mmol/L    Hemoglobin 13.1 (L) 13.3 - 17.7 g/dL    Glucose 148 (H) 70 - 99 mg/dL    Calcium Ionized Whole Blood 4.7 4.4 - 5.2 mg/dL   Lactic acid whole blood   Result Value Ref Range    Lactic Acid 2.1 0.7 - 2.1 mmol/L   Glucose by meter   Result Value Ref Range    Glucose 135 (H) 70 - 99 mg/dL   Glucose by meter   Result Value Ref Range    Glucose 139 (H) 70 - 99 mg/dL   Glucose by meter   Result Value  Ref Range    Glucose 137 (H) 70 - 99 mg/dL   Glucose by meter   Result Value Ref Range    Glucose 130 (H) 70 - 99 mg/dL   Glucose by meter   Result Value Ref Range    Glucose 133 (H) 70 - 99 mg/dL   Glucose by meter   Result Value Ref Range    Glucose 135 (H) 70 - 99 mg/dL   Glucose by meter   Result Value Ref Range    Glucose 139 (H) 70 - 99 mg/dL   Glucose by meter   Result Value Ref Range    Glucose 141 (H) 70 - 99 mg/dL   Arterial Panel   Result Value Ref Range    pH Arterial 7.38 7.35 - 7.45 pH    pCO2 Arterial 40 35 - 45 mm Hg    pO2 Arterial 133 (H) 80 - 105 mm Hg    Bicarbonate Arterial 23 21 - 28 mmol/L    Base Deficit Art 1.9 mmol/L    FIO2 38     Sodium 135 133 - 144 mmol/L    Potassium 4.6 3.4 - 5.3 mmol/L    Hemoglobin 12.6 (L) 13.3 - 17.7 g/dL    Glucose 171 (H) 70 - 99 mg/dL    Calcium Ionized Whole Blood 4.5 4.4 - 5.2 mg/dL   Glucose by meter   Result Value Ref Range    Glucose 140 (H) 70 - 99 mg/dL   Glucose by meter   Result Value Ref Range    Glucose 143 (H) 70 - 99 mg/dL   Glucose by meter   Result Value Ref Range    Glucose 155 (H) 70 - 99 mg/dL   Glucose by meter   Result Value Ref Range    Glucose 160 (H) 70 - 99 mg/dL   Glucose by meter   Result Value Ref Range    Glucose 166 (H) 70 - 99 mg/dL   Glucose by meter   Result Value Ref Range    Glucose 140 (H) 70 - 99 mg/dL   Glucose by meter   Result Value Ref Range    Glucose 167 (H) 70 - 99 mg/dL   INR   Result Value Ref Range    INR 1.18 (H) 0.86 - 1.14   Comprehensive metabolic panel   Result Value Ref Range    Sodium 137 133 - 144 mmol/L    Potassium 4.5 3.4 - 5.3 mmol/L    Chloride 103 94 - 109 mmol/L    Carbon Dioxide 23 20 - 32 mmol/L    Anion Gap 11 3 - 14 mmol/L    Glucose 158 (H) 70 - 99 mg/dL    Urea Nitrogen 21 7 - 30 mg/dL    Creatinine 0.97 0.66 - 1.25 mg/dL    GFR Estimate 84 >60 mL/min/1.7m2    GFR Estimate If Black >90   GFR Calc   >60 mL/min/1.7m2    Calcium 8.2 (L) 8.5 - 10.1 mg/dL    Bilirubin Total 1.3 0.2 -  1.3 mg/dL    Albumin 3.6 3.4 - 5.0 g/dL    Protein Total 6.5 (L) 6.8 - 8.8 g/dL    Alkaline Phosphatase 29 (L) 40 - 150 U/L     (H) 0 - 70 U/L     (H) 0 - 45 U/L   CBC (AM Draw)   Result Value Ref Range    WBC 8.5 4.0 - 11.0 10e9/L    RBC Count 4.29 (L) 4.4 - 5.9 10e12/L    Hemoglobin 12.8 (L) 13.3 - 17.7 g/dL    Hematocrit 39.1 (L) 40.0 - 53.0 %    MCV 91 78 - 100 fl    MCH 29.8 26.5 - 33.0 pg    MCHC 32.7 31.5 - 36.5 g/dL    RDW 13.3 10.0 - 15.0 %    Platelet Count 186 150 - 450 10e9/L   Glucose by meter   Result Value Ref Range    Glucose 147 (H) 70 - 99 mg/dL   Glucose by meter   Result Value Ref Range    Glucose 179 (H) 70 - 99 mg/dL   Comprehensive metabolic panel   Result Value Ref Range    Sodium 139 133 - 144 mmol/L    Potassium 4.5 3.4 - 5.3 mmol/L    Chloride 105 94 - 109 mmol/L    Carbon Dioxide 29 20 - 32 mmol/L    Anion Gap 5 3 - 14 mmol/L    Glucose 156 (H) 70 - 99 mg/dL    Urea Nitrogen 20 7 - 30 mg/dL    Creatinine 1.12 0.66 - 1.25 mg/dL    GFR Estimate 71 >60 mL/min/1.7m2    GFR Estimate If Black 86 >60 mL/min/1.7m2    Calcium 8.1 (L) 8.5 - 10.1 mg/dL    Bilirubin Total 1.1 0.2 - 1.3 mg/dL    Albumin 3.2 (L) 3.4 - 5.0 g/dL    Protein Total 6.2 (L) 6.8 - 8.8 g/dL    Alkaline Phosphatase 31 (L) 40 - 150 U/L     (H) 0 - 70 U/L     (H) 0 - 45 U/L   CBC with platelets   Result Value Ref Range    WBC 9.6 4.0 - 11.0 10e9/L    RBC Count 4.23 (L) 4.4 - 5.9 10e12/L    Hemoglobin 12.8 (L) 13.3 - 17.7 g/dL    Hematocrit 39.9 (L) 40.0 - 53.0 %    MCV 94 78 - 100 fl    MCH 30.3 26.5 - 33.0 pg    MCHC 32.1 31.5 - 36.5 g/dL    RDW 13.5 10.0 - 15.0 %    Platelet Count 158 150 - 450 10e9/L   INR   Result Value Ref Range    INR 1.14 0.86 - 1.14   CBC with platelets   Result Value Ref Range    WBC 7.9 4.0 - 11.0 10e9/L    RBC Count 3.64 (L) 4.4 - 5.9 10e12/L    Hemoglobin 11.0 (L) 13.3 - 17.7 g/dL    Hematocrit 34.9 (L) 40.0 - 53.0 %    MCV 96 78 - 100 fl    MCH 30.2 26.5 - 33.0 pg    MCHC  31.5 31.5 - 36.5 g/dL    RDW 13.4 10.0 - 15.0 %    Platelet Count 127 (L) 150 - 450 10e9/L     *Note: Due to a large number of results and/or encounters for the requested time period, some results have not been displayed. A complete set of results can be found in Results Review.          RADIOLOGY:  I personally reviewed the baseline octreotide scan that was done prior to surgery.  No postoperative scans available for review.      IMPRESSION/PLAN:  This is a 41-year-old gentleman with pancreatic head insulinoma, who presented with hypoglycemic episodes for several years prior to diagnosis.  He had Whipple surgery by Dr. Matthias Armenta on 12/08/2016, and generally is healing well.  He is having some occasional epigastric abdominal pain that may be related to pancreatic insufficiency from the surgery.      We carefully reviewed the natural course, biology, diagnosis and treatment of pancreatic neuroendocrine tumors, and specifically focused on insulinomas.  We reviewed the difference between adenocarcinoma of the pancreas, which it is not, as compared to the 5-10% of pancreatic tumors that encompass neuroendocrine tumors in general.  He has a very specific subclass called insulinoma.  He has had no further hypoglycemic episodes since the resection.  He wishes to transfer care to the [facility].  For that reason, we will make a referral to our Endocrinology partners to establish care.  Per standard surveillance guidelines, we will look forward to seeing him back with an MRI abdomen and a CBC, CMP, and chromogranin in approximately 4 months.  I will look to likely have a followup with him every 4 months within the first year following surgical resection, and likely for the second year we will switch to every 6-month followup and surveillance.  Of note, we obtained permission to look at Care Everywhere.  His C peptide at baseline was 0.7.  Insulin level at the preoperative baseline was 2.2 and 3.5.  Proinsulin  levels were less than 7.5, and a subsequent level was 9.6.      I carefully reviewed all the above.  We will make a referral to Endocrinology.  We will send to his home pharmacy a prescription for Creon, which I advised him to take 1 capsule t.i.d. a.c. to see if that helps to mitigate the epigastric discomfort that he is having in the postprandial setting.  We briefly talked about cancer genetics.  He states that at [facility] he had been tested for multiple endocrine neoplasia as a potential hereditary cause of his disease.  We do not have the results, but we will follow up on this.  I did offer a referral to our Cancer Genetics Team, and he will let us know if he wishes to have that referral scheduled.      Thank you very much for this kind referral.      I spent greater than 60 minutes in consultation, including history and physical, and 45 minutes in discussion.  I spent 30 minutes prior to the visit reviewing the patient's medical records, images, imaging reports, outside clinical records and lab values.      I reviewed and edited the note provided by the Medical Oncology fellow, Dr. Von Menjivar.  I have edited as appropriate, and agree with his statements as above.      cc:   Matthias Armenta MD            Hepatobiliary Surgery          [facility]                     Guru Missael MD          [facility]         GEOVANNA WANG MD, PhD             D: 2017 12:20   T: 2017 09:58   MT: KM      Name:     YESENIA GUERRERO   MRN:      [id number]        Account:      [id number]   :      1975           Service Date: 2017      Document: [id number]      NEW PATIENT VISIT    NAME: Yesenia Guerrero      DATE: 2017    MRN: [id number]  REFERRING PHYSICIAN: Dr. Geovany Piedra (PCP)  CC/PATIENT ID: Newly diagnosed insulinoma, s/p whipple  HPI:  Mr. Guerrero is a pleasant 41 year old male with a history of well controlled asthma who recently underwent Whipple for a insulin  secreting neuroendocrine tumor of the pancreas.  He states that about 3 years ago he started to have symptoms of confusion and vague neurologic auras.  These were worse after eating. He was initially told that he had migraines but did not have improvement with migraine treatment.  He tells me that he has a relative who is family physician who recommend that he have an endocrinology evaluation.  He eventually was referred to an endocrinologist through [facility]. He was initially told that he had reactive hypoglycemia.  Testing showed he had elevated insulin levels so MRI abdomen was performed which found to have a tumor on the head of the pancreas.  Biopsy showed this to be a neuroendocrine tumor.  Pre-operative octreotide scan did not show any uptake.    On 12/8/2016,  he underwent a Whipple resection by Dr. Armenta. He had negative margins and no evidence of metastasis. Pathology is below.  He comes in today by himself. He reports that he is still having a fair amount of pain since the surgery in his abdomen.  This is fairly constant but is much worse after eating.  He feels that this is somewhat improving though still bothersome. He denies nausea/vomiting.  He has not had any further episodes of symptoms of hypoglycemia.    PAST MEDICAL HISTORY:  Past Medical History   Diagnosis Date     Unspecified asthma(493.90)      Pancreatic tumor      Uncomplicated asthma      Migraine      Tinea versicolor      Hypoglycemia      Ruptured appendix        PAST SURGICAL HISTORY:     Past Surgical History   Procedure Laterality Date     Picc insertion       Appendectomy       Endoscopic ultrasound upper gastrointestinal tract (gi) N/A 11/18/2016     Procedure: ENDOSCOPIC ULTRASOUND, ESOPHAGOSCOPY / UPPER GASTROINTESTINAL TRACT (GI);  Surgeon: Guru Malathi Canas MD;  Location: [facility] OR     Whipple procedure N/A 12/8/2016     Procedure: WHIPPLE PROCEDURE;  Surgeon: Matthias Armenta MD;  Location:  OR  "      FAMILY HISTORY:  He denies any family history of malignancy. He states his sister has fibromyalgia but is otherwise healthy.  SH: He lives in [place], [place]. He works as a . He is . He has 2 boys ages 4 and 8. He chewed tobacco up until his surgery he stopped this. He rarely smoked at a younger age. He previously drank alcohol socially but has not for some time as this has worsened his symptoms.  He denies other drug use.          MEDS:        Current Outpatient Prescriptions on File Prior to Visit:  omeprazole (PRILOSEC) 20 MG CR capsule Take 1 capsule (20 mg) by mouth 2 times daily   calcium carbonate (TUMS) 500 MG chewable tablet Take 1-2 tablets (500-1,000 mg) by mouth 3 times daily as needed for heartburn   Ascorbic Acid (VITAMIN C PO) Take by mouth 2 times daily    albuterol (PROAIR HFA, PROVENTIL HFA, VENTOLIN HFA) 108 (90 BASE) MCG/ACT inhaler Inhale 1-2 puffs into the lungs every 6 hours as needed for shortness of breath / dyspnea or wheezing   fluticasone (FLOVENT HFA) 110 MCG/ACT inhaler Inhale 2 puffs into the lungs At Bedtime    multivitamin, therapeutic with minerals (MULTI-VITAMIN) TABS Take 1 tablet by mouth every morning    Omega-3 Fatty Acids (OMEGA-3 FISH OIL PO) Take 1 g by mouth 2 times daily (with meals) 3 capsules   Albuterol Sulfate (VENTOLIN IN) inhale  into the lungs.     No current facility-administered medications on file prior to visit.        ALLERGIES:     Allergies   Allergen Reactions     Pollen Extract      Other reaction(s): Runny Nose     Cat Hair Extract      Other reaction(s): Runny Nose     ROS: FULL 10-PT ROS performed, pertinent positives are noted above.  PHYSICAL EXAM:  VITALS /86 mmHg  Pulse 81  Temp(Src) 97.5  F (36.4  C) (Oral)  Resp 12  Ht 1.878 m (6' 1.94\")  Wt 114.034 kg (251 lb 6.4 oz)  BMI 32.33 kg/m2  SpO2 96%  KPS: 90%  Gen: alert and oriented x 3. NAD.  Eyes: ancicteric  Mouth: clear  Neck: supple  Heart: RRR. No mgr.  Lungs: " clear  Abd: well healed surgical scar, no sign of infection, slightly tender over the scar, no masses, BS+  Lower ext: well perfused, no edema  Skin: no rash  Neuro: grossly non-focal  LABS REVIEWED ON DAY OF VISIT  Labs from  (Dr. Pruitt)  Called to review 72 hour fast results.   (glucose) Dropped to 49 and later to 41 at 0900 on 10/16.   Insulin 2.2, 3.5  C-peptide 0.7, 0.7   Pro-insulin <7.5, 9.6  Beta-hydroxybutarate 0.31, 0.55    Change after glucagon 45 points. He did drop from 86 to 67 before d/c.       Dr. Pruitt on 11/3:  Testing for prolactin and iPTH normal, no MEN 1.    RADIOLOGY:          MRI Abdomen 10/31/2016  Impression:   1. Enhancing 3.9 cm mass in the pancreas with central necrosis. Given   the patient's history, this would be most suspicious for a newer   endocrine tumor such as insulinoma. This mass encases approximately   50% of the adjacent SMV, which remains patent. No other vascular   involvement.  2. Remainder of the exam is negative.    Octreotide scan 12/1/2016  Impression:     The 3D fused images as well as whole-body images demonstrates normal  distribution of the octreotide. There is no evidence of metastatic  disease or tumor. Of note, the known pancreatic malignancy does not  demonstrate radiotracer uptake.      Pathology       Copath Report     Patient Name: YESENIA TORRES   MR#: [id number]   Specimen #: [id number]   Collected: 12/8/2016   Received: 12/8/2016   Reported: 12/14/2016 12:11   Ordering Phy(s): LUKAS LOZA     For improved result formatting, select 'View Enhanced Report Format'   under Linked Documents section.     SPECIMEN(S):   A: Gallbladder   B: Whipple   C: Duodenum   D: Lymph node, portal   E: Distal stomach     FINAL DIAGNOSIS:   A. GALLBLADDER, CHOLECYSTECTOMY:   - Gallbladder with no diagnostic abnormality     B. PANCREAS, DUODENUM, WHIPPLE RESECTION:   - Pancreatic neuroendocrine tumor (5.1 cm), intermediate-grade (G2)   - Surgical resection margins:  Negative (tumor is 0.1 cm from the SMV   groove and 0.3 cm to uncinate margins)   - Ten lymph nodes with no evidence of metastasis (0/10)     C. DUODENUM, PARTIAL RESECTION:   - Segment of small bowel with extensive mucosal necrosis   - Resection margins: one with extensive necrosis and one with serositis   only     D. LYMPH NODE, PORTAL, BIOPSY:   - One lymph node with no evidence of metastatic tumor (0/1).     E. STOMACH, DISTAL, PARTIAL RESECTION:   - Segment of stomach with focal acute serositis   - One lymph node with no evidence of metastatic tumor(0/1)     COMMENT:   Immunohistochemical staining for Ki-67 is performed with appropriate   control. The proliferation index in some areas of tumor is upto 5% which   supports the above diagnosis.     Report Name: Pancreas NET   Status: Submitted     Part(s) Involved:   B: Hoipple     Synoptic Report:     CLINICAL     Clinical History:         - Hypoglycemic syndrome     Functional Type:         - Pancreatic neuroendocrine tumor, functional     SPECIMEN     Specimen:         - Head of pancreas     Procedure:         - Pancreaticoduodenectomy (Whipple resection), partial   pancreatectomy     TUMOR     Primary Tumor Site:         - Pancreatic head     Histologic Type and Grade:         - Well-differentiated neuroendocrine tumor; G2: Intermediate grade     Tumor Size: 5.1 cm     Tumor Focality:         - Unifocal     Tumor Extent       Sites(s) of Direct Extent of Tumor:           - Uncinate process       Microscopic Tumor Extension:           - Tumor is confined to pancreas     Accessory Tumor Findings       Mitotic Rate:           - 2-20 mitoses / 10 HPF         Mitoses Per 10 HPF: 4       Lymph-Vascular Invasion:           - Not identified       Perineural Invasion:           - Not identified     MARGINS     Margin Summary: All margins uninvolved by tumor       Distance of Tumor From Closest Margin: 1 mm       Margin: SMV groove margin     Pancreatic Neck /  "Parenchymal Margin:         - Uninvolved by tumor     Uncinate (Retroperitoneal / Superior Mesenteric Artery) Margin:         - Uninvolved by tumor     Bile Duct Margin:         - Uninvolved by tumor     Proximal Margin (Gastric or Duodenal):         - Uninvolved by tumor     Distal Margin (Distal Duodenal or Jejunal):         - Uninvolved by tumor     LYMPH NODES     Number of Lymph Nodes Examined: 12     Lymph Node Involvement: None identified     STAGE (PTNM)     Primary Tumor (pT):         - pT2: Tumor limited to the pancreas, more than 2 cm in greatest   dimension     Regional Lymph Nodes (pN):         - pN0: No regional lymph node metastasis     CAP eCC March 2016 Annual Release     I have personally reviewed all specimens and or slides, including the   listed special stains, and used them with my medical judgement to   determine the final diagnosis.     Electronically signed out by:     Marjan Vallecillo M.D., [facility]     CLINICAL HISTORY:   41-year-old white  with h/o uncomplicated asthma, migraine   is being evaluated for new onset hypoglycemia and a 4 cm pancreatic mass   suspicious for neuroendocrine tumor of the pancreas.     GROSS:   A: The specimen is received in formalin with proper patient   identification, labeled \"gallbladder\".  The specimen consists of a 12.7   cm in length intact gallbladder that ranges in diameter from 2.7 cm at   the neck to 5.3 cm at the fundus.  The serosa is green-gray, smooth, and   glistening.  There is a moderate amount of green-brown viscous bile.  No   choleliths identified within the container or the specimen.  The mucosa   is green-yellow, velvety, and has an average wall thickness of 0.1 cm.   No masses are grossly identified.  Representative sections, including en   face cystic duct margin, are submitted in cassette A1.     B: The specimen is received fresh with proper patient identification,   labeled \"Whipple\".  The specimen consists of " "pancreatoduodenectomy to   include a pancreas (8.1 x 7.8 x 4.5 cm) and  duodenum (9.0 cm length x   4.1 cm in diameter).  The anterior resection margin is inked yellow, the   posterior margin is inked black, the bile duct margin is inked green,   the pancreatic neck margin is inked red, the SMV groove is inked blue,   and uncinate is inked orange.     Upon sectioning, there is a 5.1 x 4.2 x 2.6 cm tan-brown, soft, friable,   and well-defined mass.  The mass abuts the anterior resection margin and   abuts the SMV groove.  The mass is 0.3 cm from the nearest uncinate   margin, 1.1 cm from the pancreatic neck margin, 2.1 cm from the   posterior, 2.4 cm from the bile duct margin, 6.5 cm from the proximal   duodenal margin, and 9.2 cm from the distal duodenal resection margin.   No other masses are grossly identified.  The remaining pancreatic   parenchyma is tan-yellow, lobulated, and unremarkable.  The duodenal   mucosa is tan-brown with regular intestinal folds.  No masses are   grossly identified.  Five lymph nodes that range from 0.5-1.1 cm   greatest dimension are identified. Representative sections are   submitted.     Summary of Sections:   B1 - pancreatic neck margin, frozen section remnant   B2 - bile duct margin, frozen section remnant   B3 - mass to anterior resection margin   B4 - mass to pancreatic neck margin   B5 - mass to SMV groove   B6 - mass to uncinate margin   B7 - mass   B8 - grossly unremarkable parenchyma   B9 - proximal duodenal resection margin   B10 - distal duodenal resection margin   B11 - duodenal mucosa   B12-B13 - duodenal fibroadipose tissue   B14 - two intact anterior lymph nodes   B15 - three intact posterior lymph nodes     C: The specimen is received in formalin with proper patient   identification, labeled \"duodenum\".  The specimen consists of 19.5 cm   length x 3.7 cm in diameter portion of small bowel.  At one end of the   specimen, the serosa is tan-gray and slightly ragged.  The " "remaining   serosa is grey-green and dusky.  At the ragged and of the specimen,   there is a thin strip of tan-pink grossly unremarkable the os.  The   remaining mucosa is gray-green and dusky.  No masses, ulcerations, or   polyps are grossly identified.  Representative sections are submitted.     Summary of Sections:   C1 - ragged resection margin   C2 - opposite resection margin   C3 - mucosa     D: The specimen is received in formalin with proper patient   identification, labeled \"portal lymph node\".  The specimen consists of a   single 1.9 x 1.2 x 0.8 cm lymph node is identified.  The lymph node is   entirely submitted in cassette D1.     E: The specimen is received in formalin with proper patient   identification, labeled \"distal stomach\".  The specimen consists of a   7.2 x 2.5 x 1.7 and partial gastrectomy with attached (10.7 x 6.0 x 1.9   cm) unremarkable adipose tissue.  The serosa is tan-pink, smooth, and   glistening.  The mucosa is tan-pink and unremarkable.  No masses are   grossly identified.  A single 1.0 x 0.6 x 0.3 cm lymph node is   identified.  Representative sections are submitted.     Summary of Sections:   E1 - resection margin   E2 - opposite end resection margin   E3 - mucosa   E4 - one intact lymph node (Dictated by: Shoshana Fernandez 12/9/2016   04:37 PM)     INTRAOPERATIVE CONSULTATION:   BFS1: Pancreatic neck margin:   \"No evidence of carcinoma\"     BFS2: Bile duct margin:   \"No evidence of carcinoma\" (Marjan Vallecillo M.D.)     MICROSCOPIC:   Microscopic examination is performed.     CPT Codes:   A: 34359-XC6   B: 73804-TT2, 91446-WQ, 39406-WQE, 10001-QPC   C: 19418-MW8   D: 14404-ET3   E: 89459-YZ4     TESTING LAB LOCATION:   [facility], [address]   [address] [phone]     COLLECTION SITE:   Client: [facility]   Location: [facility] ([place])          IMPRESSION/PLAN:  1. 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 We reviewed with him that surgery is generally curative of this disease but we still need to have close surveillance for recurrence. We reviewed the pathology with him in great detail and explained that he overall has intermediate risk factors - though this overall has a good prognosis. He would like to transfer [facility] to the [facility]. We will make a referral to our endocrinology team.  He asked about the etiology of the tumor. We explained that a subset of these are associated with genetic syndromes though many of them are not hereditary. We offered him a referral to genetics as this may have implications on his children - though would not alter our approach to his current treatment.  We will plan on having him come back in about 4 months with a repeat MRI and labs.  There is no clear guidance on the need for following hormonal levels.  We will see if our endocrinology colleagues have any input into this.  We will plan on using chromagranin A screening and unless directed otherwise will only repeat insulin levels if he has recurrent symptoms.  During the first year we will plan on seeing  him every 4 months.  We will then transition to every 6 months and then likely yearly over the next several years.  He asked about the post-operative pain and symptoms he is having. I am not sure if this will continue to improve with healing. We recommend that he do a trial of Creon in the case this might help.  If he does not have improvement with a week of this then he could stop and discuss this further with Dr. Armenta about what to expect post-operatively.      Treatment plan:  - surveillance, MRI/labs in 4 months  - Genetics if desired  - trial of Creon  RTC in: 4 months    I spent > 60 minutes in consultation including history, physical, and 45 minutes of discussion.   Over 50% of the time was  spent counseling and coordinating care.  I spent 60 minutes/hours reviewing the patient s medical records, images, imaging reports, and outside clinic records and lab values.   Patient was seen with and plan of care developed with Dr. Hanley.    Von Menjivar MD  Heme/Onc Fellow    Sincerely,    Brandan Hanley MD

## 2017-02-06 NOTE — NURSING NOTE
"Joey Guerrero is a 41 year old male who presents for:  Chief Complaint   Patient presents with     Oncology Clinic Visit     neuroendocirne tumor        Initial Vitals:  /86 mmHg  Pulse 81  Temp(Src) 97.5  F (36.4  C) (Oral)  Resp 12  Ht 1.88 m (6' 2\")  Wt 114.034 kg (251 lb 6.4 oz)  BMI 32.26 kg/m2  SpO2 96% Estimated body mass index is 32.26 kg/(m^2) as calculated from the following:    Height as of this encounter: 1.88 m (6' 2\").    Weight as of this encounter: 114.034 kg (251 lb 6.4 oz).. Body surface area is 2.44 meters squared. BP completed using cuff size: large  No Pain (0) No LMP for male patient. Allergies and medications reviewed.     Medications: Medication refills not needed today.  Pharmacy name entered into [employer]:    [facility] [address] [place], [place] - [address] [facility] [address] [facility] [address] [address] AT [facility] OF  [address]    Comments: pt has stomach pain and rates it at a 3    7 minutes for nursing intake (face to face time)   Tita Rogers CMA          "

## 2017-02-07 NOTE — PROGRESS NOTES
MEDICAL ONCOLOGY NEW PATIENT VISIT NOTE      REFERRING PHYSICIAN:  Dr. Matthias Armenta, Hepatobiliary Surgery, [facility].      PATIENT ID:  [id number] Pancreatic head insulinoma.  No evidence of metastatic disease.      HISTORY OF PRESENT ILLNESS:  Mr. Joey Guerrero is a 41-year-old  gentleman from [place], [place].  He was recently diagnosed with pancreatic head insulinoma.      Over a 2-3 year period, beginning in 2014 or so, he was having hypoglycemic episodes of unexplained origin.  He was even hospitalized for one such episode in the fall of 2016.  He met with a primary care physician, and subsequently an endocrinologist.  There was concern for hypoglycemic cause and concern for potential insulinoma.  On 10/31/2016, [facility] revealed a 3.9 x 3.7 x 3.6 cm mass in the head of the pancreas.  There was involvement of the superior mesenteric vein, but no evidence of metastatic disease noted.  He [facility] with Dr. Migel [facility]a [facility], and a referral was made for surgical evaluation.      On 11/11/2016, a CT abdomen and pelvis was performed revealing an enhancing lesion with a cystic component versus central necrosis in the head and the uncinate process of the pancreas.  There was an indeterminate portacaval lymph node measuring 2.1 cm.  Otherwise, no evidence of suspicious adenopathy in the abdomen.  EUS was performed by Dr. Guru Canas at the [facility] on 11/18/2016.  He performed an FNA.  This revealed the tumor was composed of grade 1 neuroendocrine tumor with a low proliferation marker of Ki-67 less than 2%.  The tumor was positive for synaptophysin, chromogranin and CD56.      On 12/01/2016, the patient underwent a preoperative octreotide scan that did not show any evidence of metastatic disease or tumor.  There was no radiotracer uptake.  A baseline chromogranin A was 70.  On 12/05/2016, the patient met with Dr. Matthias Armenta, who took the patient to the  operating room on 12/08/2016 to perform a Whipple.  The pancreaticoduodenectomy resulted in the revealing of a pancreatic neuroendocrine tumor at the head of the pancreas that measured 5.1 cm with intermediate grade G2.  Margins were negative by 0.1 cm.  Zero of 12 lymph nodes were involved.  The Ki-67 registered at 5% with 4 mitoses/10 high-power fields.  There was no perivascular, nor perineural invasion.  The pathologic stage was T2.  The pathologic N stage was N0, and M0 per the prior octreotide scan.  The patient is currently 9 weeks out from surgery.      He presents today alone for evaluation upon the kind referral of Dr. Armenta and also Dr. Canas.  He characterizes epigastric and stomach pain that is worsened upon eating.  He has not been on Creon supplementation thus yet.  He otherwise is doing well.  He has not yet returned to his job as a  in [place].      PAST MEDICAL HISTORY:  Pancreatic neuroendocrine tumor, pathologic T2 N0 M0 per above, status post Whipple surgery on 12/08/2016.  Other surgeries notable include an appendectomy in 2007.      FAMILY HISTORY:  Denies any family history of malignancy.      SOCIAL HISTORY:  The patient lives in [place], [place], with his wife.  They have 2 boys, ages 8 and 4.  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    Tobacco:  Used to chew and smoke occasional cigarettes since the age of 18 until recently.    Alcohol:  Denies alcohol abuse.    Drugs:  Denies illicit drug use.      MEDICATIONS:  Fully reviewed in Epic.   Current Outpatient Prescriptions   Medication     amylase-lipase-protease (CREON 6) 6000 UNITS CPEP     omeprazole (PRILOSEC) 20 MG CR capsule     calcium carbonate (TUMS) 500 MG chewable tablet     Ascorbic Acid (VITAMIN C PO)     albuterol (PROAIR HFA, PROVENTIL HFA, VENTOLIN HFA) 108 (90 BASE) MCG/ACT inhaler     fluticasone (FLOVENT HFA) 110 MCG/ACT inhaler     multivitamin, therapeutic with minerals  "(MULTI-VITAMIN) TABS     Omega-3 Fatty Acids (OMEGA-3 FISH OIL PO)     Albuterol Sulfate (VENTOLIN IN)     No current facility-administered medications for this visit.          ALLERGIES:  No known drug allergies.      REVIEW OF SYSTEMS:  Full 10-point review of systems was performed.  Pertinent symptoms are reviewed above per HPI.      PHYSICAL EXAM:   /86 mmHg  Pulse 81  Temp(Src) 97.5  F (36.4  C) (Oral)  Resp 12  Ht 1.878 m (6' 1.94\")  Wt 114.034 kg (251 lb 6.4 oz)  BMI 32.33 kg/m2  SpO2 96%    KPS:  90%.   GENERAL:  Very pleasant young adult  gentleman, in no acute distress, alert and oriented x3.   HEENT:  Anicteric sclerae. PERRLA, oropharynx clear with no mucositis or thrush.   LYMPH NODES:  No palpable pre/post-auricular, cervical, axillary, or inguinal lymphadenopathy appreciated.   CV:  RRR, normal S1 S2.  No murmurs, gallops, or rubs.   LUNGS:  Clear to auscultation bilaterally.  No dullness to percussion.   ABDOMEN:  Soft, nontender and nondistended.  Bowel sounds heard x4.  No apparent hepatosplenomegaly.    Broad/long midline scar.  No overlying fluctuance, erythema or tenderness, healing well.   EXTREMITIES:  No clubbing, cyanosis, or edema.   NEUROLOGIC:  Cranial Nerves II-XII grossly intact.  Motor strength and sensation grossly intact.       LABORATORY DATA:  Reviewed on the day of visit, including the preoperative serum chromogranin A of 70 on 12/06/2016.   Results for orders placed or performed during the hospital encounter of 12/08/16   XR Abdomen Port 1 View    Narrative    EXAM: XR ABDOMEN PORT F1 VW  12/11/2016 1:14 PM      HISTORY: assess NG position    COMPARISON: CT 11/11/2016    FINDINGS: Gastric tube sidehole projects just above the  gastroesophageal junction with the tip projecting over the fundus.  Biliary and pancreatic stents. Surgical drain in the right upper  quadrant. Scattered surgical clips and midline staples. Percutaneous  jejunostomy tube. No abnormally " dilated air-filled loops of large or  small bowel. No acute osseous and amounted.       Impression    IMPRESSION: Gastric tube sidehole projects just above the  gastroesophageal junction. Consider advancing.    I have personally reviewed the examination and initial interpretation  and I agree with the findings.    LUKAS MARX MD   XR Abdomen Port 1 View   Result Value Ref Range    Radiologist flags Nasogastric tube tip position (Urgent)     Narrative    Examination: XR ABDOMEN PORT F1 VW, 12/12/2016 11:08 AM    Comparison: 12/11/2016    History: assess NG tube position, displaced    Findings: Nasogastric tube has been pulled back, tip at the level of  the distal esophagus. Postoperative changes of Whipple procedure.  Percutaneous jejunostomy tube, surgical drain and biliary/pancreatic  stents appears stable in position. Surgical clips. Nonobstructive  bowel gas pattern. No pneumatosis, portal venous gas or gross free air  within the limits of this supine radiograph.      Impression    Impression:   1. Nasogastric tube has been pulled back, tip now at the level of the  distal esophagus. Recommended advancing.  2. Lines and tubes otherwise stable.    [Access Center: Nasogastric tube tip position]    This report will be copied to the [facility] to ensure a  provider is contacted. Access Center is available Monday through  Friday 8am-3:30 pm.     I have personally reviewed the examination and initial interpretation  and I agree with the findings.    ALIRIO AGARWAL   XR Abdomen Port 1 View    Narrative    XR ABDOMEN PORT F1 VW  12/19/2016 5:01 PM      HISTORY: nausea and vomiting    COMPARISON: 12/12/2016    FINDINGS: Postsurgical changes of Whipple procedure. Redemonstration  of jejunostomy tube, biliary and pancreatic drains. Bowel loops are  not distended. Paucity of small bowel gas in the abdomen. Overall  nonobstructive bowel gas pattern. No pneumatosis or portal venous gas.  Small amount of stool  in the colon.       Impression    IMPRESSION: Nonobstructive bowel gas pattern.    I have personally reviewed the examination and initial interpretation  and I agree with the findings.    KATE ANN MD   Arterial Panel   Result Value Ref Range    pH Arterial 7.35 7.35 - 7.45 pH    pCO2 Arterial 46 (H) 35 - 45 mm Hg    pO2 Arterial 156 (H) 80 - 105 mm Hg    Bicarbonate Arterial 25 21 - 28 mmol/L    Base Deficit Art 0.7 mmol/L    FIO2 44.0     Sodium 136 133 - 144 mmol/L    Potassium 4.0 3.4 - 5.3 mmol/L    Hemoglobin 13.0 (L) 13.3 - 17.7 g/dL    Glucose 89 70 - 99 mg/dL    Calcium Ionized Whole Blood 4.9 4.4 - 5.2 mg/dL   Arterial Panel   Result Value Ref Range    pH Arterial 7.38 7.35 - 7.45 pH    pCO2 Arterial 41 35 - 45 mm Hg    pO2 Arterial 143 (H) 80 - 105 mm Hg    Bicarbonate Arterial 24 21 - 28 mmol/L    Base Deficit Art 0.9 mmol/L    FIO2 43.0     Sodium 134 133 - 144 mmol/L    Potassium 4.3 3.4 - 5.3 mmol/L    Hemoglobin 13.1 (L) 13.3 - 17.7 g/dL    Glucose 103 (H) 70 - 99 mg/dL    Calcium Ionized Whole Blood 4.9 4.4 - 5.2 mg/dL   Glucose by meter   Result Value Ref Range    Glucose 80 70 - 99 mg/dL   Arterial Panel   Result Value Ref Range    pH Arterial 7.38 7.35 - 7.45 pH    pCO2 Arterial 41 35 - 45 mm Hg    pO2 Arterial 156 (H) 80 - 105 mm Hg    Bicarbonate Arterial 24 21 - 28 mmol/L    Base Deficit Art 0.9 mmol/L    FIO2 41.0     Sodium 134 133 - 144 mmol/L    Potassium 4.5 3.4 - 5.3 mmol/L    Hemoglobin 13.1 (L) 13.3 - 17.7 g/dL    Glucose 115 (H) 70 - 99 mg/dL    Calcium Ionized Whole Blood 4.8 4.4 - 5.2 mg/dL   Arterial Panel   Result Value Ref Range    pH Arterial 7.38 7.35 - 7.45 pH    pCO2 Arterial 41 35 - 45 mm Hg    pO2 Arterial 167 (H) 80 - 105 mm Hg    Bicarbonate Arterial 24 21 - 28 mmol/L    Base Deficit Art 1.0 mmol/L    FIO2 41.0     Sodium 134 133 - 144 mmol/L    Potassium 4.6 3.4 - 5.3 mmol/L    Hemoglobin 13.2 (L) 13.3 - 17.7 g/dL    Glucose 117 (H) 70 - 99 mg/dL    Calcium Ionized  Whole Blood 4.8 4.4 - 5.2 mg/dL   Arterial Panel   Result Value Ref Range    pH Arterial 7.37 7.35 - 7.45 pH    pCO2 Arterial 41 35 - 45 mm Hg    pO2 Arterial 168 (H) 80 - 105 mm Hg    Bicarbonate Arterial 24 21 - 28 mmol/L    Base Deficit Art 1.3 mmol/L    FIO2 41.0     Sodium 133 133 - 144 mmol/L    Potassium 4.8 3.4 - 5.3 mmol/L    Hemoglobin 13.1 (L) 13.3 - 17.7 g/dL    Glucose 123 (H) 70 - 99 mg/dL    Calcium Ionized Whole Blood 4.8 4.4 - 5.2 mg/dL   Glucose by meter   Result Value Ref Range    Glucose 130 (H) 70 - 99 mg/dL   Glucose by meter   Result Value Ref Range    Glucose 131 (H) 70 - 99 mg/dL   Glucose by meter   Result Value Ref Range    Glucose 141 (H) 70 - 99 mg/dL   Glucose by meter   Result Value Ref Range    Glucose 142 (H) 70 - 99 mg/dL   Glucose by meter   Result Value Ref Range    Glucose 141 (H) 70 - 99 mg/dL   Glucose by meter   Result Value Ref Range    Glucose 126 (H) 70 - 99 mg/dL   Glucose by meter   Result Value Ref Range    Glucose 126 (H) 70 - 99 mg/dL   Arterial Panel   Result Value Ref Range    pH Arterial 7.40 7.35 - 7.45 pH    pCO2 Arterial 37 35 - 45 mm Hg    pO2 Arterial 162 (H) 80 - 105 mm Hg    Bicarbonate Arterial 23 21 - 28 mmol/L    Base Deficit Art 1.8 mmol/L    FIO2 39     Sodium 132 (L) 133 - 144 mmol/L    Potassium 4.3 3.4 - 5.3 mmol/L    Hemoglobin 13.1 (L) 13.3 - 17.7 g/dL    Glucose 148 (H) 70 - 99 mg/dL    Calcium Ionized Whole Blood 4.7 4.4 - 5.2 mg/dL   Lactic acid whole blood   Result Value Ref Range    Lactic Acid 2.1 0.7 - 2.1 mmol/L   Glucose by meter   Result Value Ref Range    Glucose 135 (H) 70 - 99 mg/dL   Glucose by meter   Result Value Ref Range    Glucose 139 (H) 70 - 99 mg/dL   Glucose by meter   Result Value Ref Range    Glucose 137 (H) 70 - 99 mg/dL   Glucose by meter   Result Value Ref Range    Glucose 130 (H) 70 - 99 mg/dL   Glucose by meter   Result Value Ref Range    Glucose 133 (H) 70 - 99 mg/dL   Glucose by meter   Result Value Ref Range     Glucose 135 (H) 70 - 99 mg/dL   Glucose by meter   Result Value Ref Range    Glucose 139 (H) 70 - 99 mg/dL   Glucose by meter   Result Value Ref Range    Glucose 141 (H) 70 - 99 mg/dL   Arterial Panel   Result Value Ref Range    pH Arterial 7.38 7.35 - 7.45 pH    pCO2 Arterial 40 35 - 45 mm Hg    pO2 Arterial 133 (H) 80 - 105 mm Hg    Bicarbonate Arterial 23 21 - 28 mmol/L    Base Deficit Art 1.9 mmol/L    FIO2 38     Sodium 135 133 - 144 mmol/L    Potassium 4.6 3.4 - 5.3 mmol/L    Hemoglobin 12.6 (L) 13.3 - 17.7 g/dL    Glucose 171 (H) 70 - 99 mg/dL    Calcium Ionized Whole Blood 4.5 4.4 - 5.2 mg/dL   Glucose by meter   Result Value Ref Range    Glucose 140 (H) 70 - 99 mg/dL   Glucose by meter   Result Value Ref Range    Glucose 143 (H) 70 - 99 mg/dL   Glucose by meter   Result Value Ref Range    Glucose 155 (H) 70 - 99 mg/dL   Glucose by meter   Result Value Ref Range    Glucose 160 (H) 70 - 99 mg/dL   Glucose by meter   Result Value Ref Range    Glucose 166 (H) 70 - 99 mg/dL   Glucose by meter   Result Value Ref Range    Glucose 140 (H) 70 - 99 mg/dL   Glucose by meter   Result Value Ref Range    Glucose 167 (H) 70 - 99 mg/dL   INR   Result Value Ref Range    INR 1.18 (H) 0.86 - 1.14   Comprehensive metabolic panel   Result Value Ref Range    Sodium 137 133 - 144 mmol/L    Potassium 4.5 3.4 - 5.3 mmol/L    Chloride 103 94 - 109 mmol/L    Carbon Dioxide 23 20 - 32 mmol/L    Anion Gap 11 3 - 14 mmol/L    Glucose 158 (H) 70 - 99 mg/dL    Urea Nitrogen 21 7 - 30 mg/dL    Creatinine 0.97 0.66 - 1.25 mg/dL    GFR Estimate 84 >60 mL/min/1.7m2    GFR Estimate If Black >90   GFR Calc   >60 mL/min/1.7m2    Calcium 8.2 (L) 8.5 - 10.1 mg/dL    Bilirubin Total 1.3 0.2 - 1.3 mg/dL    Albumin 3.6 3.4 - 5.0 g/dL    Protein Total 6.5 (L) 6.8 - 8.8 g/dL    Alkaline Phosphatase 29 (L) 40 - 150 U/L     (H) 0 - 70 U/L     (H) 0 - 45 U/L   CBC (AM Draw)   Result Value Ref Range    WBC 8.5 4.0 - 11.0  10e9/L    RBC Count 4.29 (L) 4.4 - 5.9 10e12/L    Hemoglobin 12.8 (L) 13.3 - 17.7 g/dL    Hematocrit 39.1 (L) 40.0 - 53.0 %    MCV 91 78 - 100 fl    MCH 29.8 26.5 - 33.0 pg    MCHC 32.7 31.5 - 36.5 g/dL    RDW 13.3 10.0 - 15.0 %    Platelet Count 186 150 - 450 10e9/L   Glucose by meter   Result Value Ref Range    Glucose 147 (H) 70 - 99 mg/dL   Glucose by meter   Result Value Ref Range    Glucose 179 (H) 70 - 99 mg/dL   Comprehensive metabolic panel   Result Value Ref Range    Sodium 139 133 - 144 mmol/L    Potassium 4.5 3.4 - 5.3 mmol/L    Chloride 105 94 - 109 mmol/L    Carbon Dioxide 29 20 - 32 mmol/L    Anion Gap 5 3 - 14 mmol/L    Glucose 156 (H) 70 - 99 mg/dL    Urea Nitrogen 20 7 - 30 mg/dL    Creatinine 1.12 0.66 - 1.25 mg/dL    GFR Estimate 71 >60 mL/min/1.7m2    GFR Estimate If Black 86 >60 mL/min/1.7m2    Calcium 8.1 (L) 8.5 - 10.1 mg/dL    Bilirubin Total 1.1 0.2 - 1.3 mg/dL    Albumin 3.2 (L) 3.4 - 5.0 g/dL    Protein Total 6.2 (L) 6.8 - 8.8 g/dL    Alkaline Phosphatase 31 (L) 40 - 150 U/L     (H) 0 - 70 U/L     (H) 0 - 45 U/L   CBC with platelets   Result Value Ref Range    WBC 9.6 4.0 - 11.0 10e9/L    RBC Count 4.23 (L) 4.4 - 5.9 10e12/L    Hemoglobin 12.8 (L) 13.3 - 17.7 g/dL    Hematocrit 39.9 (L) 40.0 - 53.0 %    MCV 94 78 - 100 fl    MCH 30.3 26.5 - 33.0 pg    MCHC 32.1 31.5 - 36.5 g/dL    RDW 13.5 10.0 - 15.0 %    Platelet Count 158 150 - 450 10e9/L   INR   Result Value Ref Range    INR 1.14 0.86 - 1.14   CBC with platelets   Result Value Ref Range    WBC 7.9 4.0 - 11.0 10e9/L    RBC Count 3.64 (L) 4.4 - 5.9 10e12/L    Hemoglobin 11.0 (L) 13.3 - 17.7 g/dL    Hematocrit 34.9 (L) 40.0 - 53.0 %    MCV 96 78 - 100 fl    MCH 30.2 26.5 - 33.0 pg    MCHC 31.5 31.5 - 36.5 g/dL    RDW 13.4 10.0 - 15.0 %    Platelet Count 127 (L) 150 - 450 10e9/L     *Note: Due to a large number of results and/or encounters for the requested time period, some results have not been displayed. A complete set of  results can be found in Results Review.          RADIOLOGY:  I personally reviewed the baseline octreotide scan that was done prior to surgery.  No postoperative scans available for review.      IMPRESSION/PLAN:  This is a 41-year-old gentleman with pancreatic head insulinoma, who presented with hypoglycemic episodes for several years prior to diagnosis.  He had Whipple surgery by Dr. Matthias Armenta on 12/08/2016, and generally is healing well.  He is having some occasional epigastric abdominal pain that may be related to pancreatic insufficiency from the surgery.      We carefully reviewed the natural course, biology, diagnosis and treatment of pancreatic neuroendocrine tumors, and specifically focused on insulinomas.  We reviewed the difference between adenocarcinoma of the pancreas, which it is not, as compared to the 5-10% of pancreatic tumors that encompass neuroendocrine tumors in general.  He has a very specific subclass called insulinoma.  He has had no further hypoglycemic episodes since the resection.  He wishes to transfer care to the [facility].  For that reason, we will make a referral to our Endocrinology partners to establish care.  Per standard surveillance guidelines, we will look forward to seeing him back with an MRI abdomen and a CBC, CMP, and chromogranin in approximately 4 months.  I will look to likely have a followup with him every 4 months within the first year following surgical resection, and likely for the second year we will switch to every 6-month followup and surveillance.  Of note, we obtained permission to look at Care Everywhere.  His C peptide at baseline was 0.7.  Insulin level at the preoperative baseline was 2.2 and 3.5.  Proinsulin levels were less than 7.5, and a subsequent level was 9.6.      I carefully reviewed all the above.  We will make a referral to Endocrinology.  We will send to his home pharmacy a prescription for Creon, which I advised him to take 1  capsule t.i.d. a.c. to see if that helps to mitigate the epigastric discomfort that he is having in the postprandial setting.  We briefly talked about cancer genetics.  He states that at [facility] he had been tested for multiple endocrine neoplasia as a potential hereditary cause of his disease.  We do not have the results, but we will follow up on this.  I did offer a referral to our Cancer Genetics Team, and he will let us know if he wishes to have that referral scheduled.      Thank you very much for this kind referral.      I spent greater than 60 minutes in consultation, including history and physical, and 45 minutes in discussion.  I spent 30 minutes prior to the visit reviewing the patient's medical records, images, imaging reports, outside clinical records and lab values.      I reviewed and edited the note provided by the Medical Oncology fellow, Dr. Von Menjivar.  I have edited as appropriate, and agree with his statements as above.      cc:   Matthias Armenta MD            Hepatobiliary Surgery          [facility]                     Guru Missael MD          [facility]         GEOVANNA WANG MD, PhD             D: 2017 12:20   T: 2017 09:58   MT: [id number]      Name:     YESENIA TORRES   MRN:      [id number]        Account:      [id number]   :      1975           Service Date: 2017      Document: [id number]

## 2017-02-28 ENCOUNTER — TELEPHONE (OUTPATIENT)
Dept: ENDOCRINOLOGY | Facility: CLINIC | Age: 42
End: 2017-02-28

## 2017-02-28 NOTE — TELEPHONE ENCOUNTER
----- Message from Arti Flannery MD sent at 2/28/2017 12:02 PM CST -----  Regarding: RE: Insulinoma  That's fine. One of us will see him.     ----- Message -----     From: Charis Russo RN     Sent: 2/28/2017  11:25 AM       To: Arti Flannery MD, #  Subject: Insulinoma                                       I scheduled Joey for Wednesday 4 PM      I will forward this to Vega also just in case. Great learning  opportunity .    .

## 2017-03-08 PROBLEM — D13.7 INSULINOMA: Status: ACTIVE | Noted: 2017-03-08

## 2017-03-10 ENCOUNTER — CARE COORDINATION (OUTPATIENT)
Dept: SURGERY | Facility: CLINIC | Age: 42
End: 2017-03-10

## 2017-05-10 ENCOUNTER — OFFICE VISIT (OUTPATIENT)
Dept: ENDOCRINOLOGY | Facility: CLINIC | Age: 42
End: 2017-05-10

## 2017-05-10 VITALS
WEIGHT: 251 LBS | DIASTOLIC BLOOD PRESSURE: 82 MMHG | BODY MASS INDEX: 32.21 KG/M2 | HEART RATE: 61 BPM | HEIGHT: 74 IN | SYSTOLIC BLOOD PRESSURE: 123 MMHG

## 2017-05-10 DIAGNOSIS — D13.7 INSULINOMA: ICD-10-CM

## 2017-05-10 DIAGNOSIS — D3A.8 NEUROENDOCRINE TUMOR (H): Primary | ICD-10-CM

## 2017-05-10 DIAGNOSIS — R73.03 PRE-DIABETES: ICD-10-CM

## 2017-05-10 DIAGNOSIS — D3A.8 NEUROENDOCRINE TUMOR (H): ICD-10-CM

## 2017-05-10 LAB
CORTIS SERPL-MCNC: 12.4 UG/DL (ref 4–22)
GLUCOSE SERPL-MCNC: 94 MG/DL (ref 70–99)
HBA1C MFR BLD: 5.8 % (ref 4.3–6)

## 2017-05-10 ASSESSMENT — PAIN SCALES - GENERAL: PAINLEVEL: NO PAIN (0)

## 2017-05-10 NOTE — PATIENT INSTRUCTIONS
To expedite your medication refill(s), please contact your pharmacy and have them fax a refill request to: [phone].  *Please allow 3 business days for routine medication refills.  *Please allow 5 business days for controlled substance medication refills.  --------------------  For scheduling appointments (including lab work), please request an appointment through Box, or call: [phone].    For questions for your provider or the endocrine nurse, please send a Box message.  For after-hours urgent issues, please dial [phone], and ask to speak with the Endocrinologist On-Call.  --------------------  Please Note: If you are active on Box, all future test results will be sent by Box message only and will no longer be sent by mail. You may also receive communication directly from your physician.
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
## 2017-05-10 NOTE — MR AVS SNAPSHOT
After Visit Summary   5/10/2017    Joey Guerrero    MRN: [id number]           Patient Information     Date Of Birth          1975        Visit Information        Provider Department      5/10/2017 6:00 PM Eva Ortiz MD [facility]        Today's Diagnoses     Neuroendocrine tumor    -  1    Insulinoma        Pre-diabetes          Care Instructions    To expedite your medication refill(s), please contact your pharmacy and have them fax a refill request to: [phone].  *Please allow 3 business days for routine medication refills.  *Please allow 5 business days for controlled substance medication refills.  --------------------  For scheduling appointments (including lab work), please request an appointment through [employer], or call: [phone].    For questions for your provider or the endocrine nurse, please send a [employer] message.  For after-hours urgent issues, please dial [phone], and ask to speak with the Endocrinologist On-Call.  --------------------  Please Note: If you are active on [employer], all future test results will be sent by [employer] message only and will no longer be sent by mail. You may also receive communication directly from your physician.          Follow-ups after your visit        Your next 10 appointments already scheduled     Jun 05, 2017 10:30 AM CDT   Lab with  LAB    [facility] ([facility])    [address] [address] [phone]            Jun 05, 2017 11:00 AM CDT   MR ABDOMEN W/O & W CONTRAST with UUMR1   [facility] [place], MRI ([facility])    [address] [facility] [id number] [phone]           Take your medicines as usual, unless your doctor tells you not to. Bring a list of your current medicines to your exam (including vitamins, minerals and over-the-counter drugs). Also bring the results of similar scans  you may have had.    The day before your exam, drink extra fluids at least six 8-ounce glasses (unless your doctor tells you to restrict your fluids).   Have a blood test (creatinine test) within 30 days of your exam. Go to your clinic or Diagnostic Imaging Department for this test.   Do not eat or drink for 6 hours prior to exam.  The MRI machine uses a strong magnet. Please wear clothes without metal (snaps, zippers). A sweatsuit works well, or we may give you a hospital gown.  Please remove any body piercings and hair extensions before you arrive. You will also remove watches, jewelry, hairpins, wallets, dentures, partial dental plates and hearing aids. You may wear contact lenses, and you may be able to wear your rings. We have a safe place to keep your personal items, but it is safer to leave them at home.   **IMPORTANT** THE INSTRUCTIONS BELOW ARE ONLY FOR THOSE PATIENTS WHO HAVE BEEN TOLD THEY WILL RECEIVE SEDATION OR GENERAL ANESTHESIA DURING THEIR MRI PROCEDURE:  IF YOU WILL RECEIVE SEDATION (take medicine to help you relax during your exam):   You must get the medicine from your doctor before you arrive. Bring the medicine to the exam. Do not take it at home.   Arrive one hour early. Bring someone who can take you home after the test. Your medicine will make you sleepy. After the exam, you may not drive, take a bus or take a taxi by yourself.   No eating 8 hours before your exam. You may have clear liquids up until 4 hours before your exam. (Clear liquids include water, clear tea, black coffee and fruit juice without pulp.)  IF YOU WILL RECEIVE ANESTHESIA (be asleep for your exam):   Arrive 1 1/2 hours early. Bring someone who can take you home after the test. You may not drive, take a bus or take a taxi by yourself.   No eating 8 hours before your exam. You may have clear liquids up until 4 hours before your exam. (Clear liquids include water, clear tea, black coffee and fruit juice without pulp.)  If you  have any questions, please contact your Imaging Department exam site.            2017 12:00 PM CDT   (Arrive by 11:45 AM)   Return Visit with Brandan Hanley MD   [facility] ([facility])    [address] [place] [address]  [address] [facility] [id number] [phone]            Sep 12, 2017  3:30 PM CDT   (Arrive by 3:15 PM)   RETURN ENDOCRINE with Eva Ortiz MD   [facility] ([facility])    [address] [place] [address]  [address] [facility] [address] [phone]              Future tests that were ordered for you today     Open Future Orders        Priority Expected Expires Ordered    Proinsulin Routine  2018 5/10/2017            Who to contact     Please call your clinic at [phone] to:    Ask questions about your health    Make or cancel appointments    Discuss your medicines    Learn about your test results    Speak to your doctor   If you have compliments or concerns about an experience at your clinic, or if you wish to file a complaint, please contact [facility] Patient Relations at [phone] or email us at [email or web address]         Additional Information About Your Visit        Click With Me Nowhart Information     [employer] is an electronic gateway that provides easy, online access to your medical records. With [employer], you can request a clinic appointment, read your test results, renew a prescription or communicate with your care team.     To sign up for [employer] visit the website at [email or web address]   You will be asked to enter the access code listed below, as well as some personal information. Please follow the directions to create your username and password.     Your access code is: [email or web address]  Expires: 2017  7:30 AM     Your access code will  in 90 days. If you need help or a new code, please contact your [facility] or call  "[phone] for assistance.        Care EveryWhere ID     This is your Care EveryWhere ID. This could be used by other organizations to access your [facility] medical records  [id number]        Your Vitals Were     Pulse Height BMI (Body Mass Index)             61 1.88 m (6' 2\") 32.23 kg/m2          Blood Pressure from Last 3 Encounters:   05/10/17 123/82   02/06/17 122/86   01/23/17 117/80    Weight from Last 3 Encounters:   05/10/17 113.9 kg (251 lb)   02/06/17 114 kg (251 lb 6.4 oz)   01/23/17 112.9 kg (249 lb)                 Today's Medication Changes          These changes are accurate as of: 5/10/17 10:45 PM.  If you have any questions, ask your nurse or doctor.               Stop taking these medicines if you haven't already. Please contact your care team if you have questions.     omeprazole 20 MG CR capsule   Commonly known as:  priLOSEC   Stopped by:  Eva Ortiz MD                    Primary Care Provider Office Phone # Fax #    Ken Piedra [phone]       [address] [address]        Thank you!     Thank you for choosing [facility]  for your care. Our goal is always to provide you with excellent care. Hearing back from our patients is one way we can continue to improve our services. Please take a few minutes to complete the written survey that you may receive in the mail after your visit with us. Thank you!             Your Updated Medication List - Protect others around you: Learn how to safely use, store and throw away your medicines at www.disposemymeds.org.          This list is accurate as of: 5/10/17 10:45 PM.  Always use your most recent med list.                   Brand Name Dispense Instructions for use    albuterol 108 (90 BASE) MCG/ACT Inhaler    PROAIR HFA/PROVENTIL HFA/VENTOLIN HFA     Inhale 1-2 puffs into the lungs every 6 hours as needed for shortness of breath / dyspnea or wheezing       amylase-lipase-protease " 6000 UNITS Cpep    CREON 6    450 capsule    Take 1-2 capsules (6,000-12,000 Units) by mouth 3 times daily (with meals)       calcium carbonate 500 MG chewable tablet    TUMS    150 tablet    Take 1-2 tablets (500-1,000 mg) by mouth 3 times daily as needed for heartburn       fluticasone 110 MCG/ACT Inhaler    FLOVENT HFA     Inhale 2 puffs into the lungs At Bedtime       Multi-vitamin Tabs tablet      Take 1 tablet by mouth every morning       OMEGA-3 FISH OIL PO      Take 1 g by mouth 2 times daily (with meals) 3 capsules       VENTOLIN IN      inhale  into the lungs.       VITAMIN C PO      Take by mouth 2 times daily
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
## 2017-05-10 NOTE — NURSING NOTE
Chief Complaint   Patient presents with     Consult     NEW PATIENT- INSULINOMA     Tamera Kurtz, [facility]  Endocrinology & Diabetes 3G

## 2017-05-11 ENCOUNTER — TELEPHONE (OUTPATIENT)
Dept: ENDOCRINOLOGY | Facility: CLINIC | Age: 42
End: 2017-05-11

## 2017-05-11 DIAGNOSIS — D13.7 INSULINOMA: ICD-10-CM

## 2017-05-11 LAB
ACTH PLAS-MCNC: 28 PG/ML
C PEPTIDE SERPL-MCNC: 1.1 NG/ML (ref 0.9–6.9)

## 2017-05-13 LAB — CGA SERPL-MCNC: 55 NG/ML

## 2017-05-14 LAB — PROINSULIN P 12H FAST SERPL-SCNC: 1.7 PMOL/L

## 2017-06-05 ENCOUNTER — HOSPITAL ENCOUNTER (OUTPATIENT)
Dept: MRI IMAGING | Facility: CLINIC | Age: 42
Discharge: HOME | End: 2017-06-05
Attending: INTERNAL MEDICINE | Admitting: INTERNAL MEDICINE
Payer: COMMERCIAL

## 2017-06-05 DIAGNOSIS — D13.7 INSULINOMA: ICD-10-CM

## 2017-06-05 LAB
ALBUMIN SERPL-MCNC: 3.8 G/DL (ref 3.4–5)
ALP SERPL-CCNC: 41 U/L (ref 40–150)
ALT SERPL W P-5'-P-CCNC: 54 U/L (ref 0–70)
ANION GAP SERPL CALCULATED.3IONS-SCNC: 9 MMOL/L (ref 3–14)
AST SERPL W P-5'-P-CCNC: 29 U/L (ref 0–45)
BASOPHILS # BLD AUTO: 0 10E9/L (ref 0–0.2)
BASOPHILS NFR BLD AUTO: 0.9 %
BILIRUB SERPL-MCNC: 0.6 MG/DL (ref 0.2–1.3)
BUN SERPL-MCNC: 30 MG/DL (ref 7–30)
CALCIUM SERPL-MCNC: 9 MG/DL (ref 8.5–10.1)
CHLORIDE SERPL-SCNC: 102 MMOL/L (ref 94–109)
CO2 SERPL-SCNC: 25 MMOL/L (ref 20–32)
CREAT SERPL-MCNC: 1.02 MG/DL (ref 0.66–1.25)
DIFFERENTIAL METHOD BLD: ABNORMAL
EOSINOPHIL # BLD AUTO: 0.1 10E9/L (ref 0–0.7)
EOSINOPHIL NFR BLD AUTO: 4 %
ERYTHROCYTE [DISTWIDTH] IN BLOOD BY AUTOMATED COUNT: 15 % (ref 10–15)
GFR SERPL CREATININE-BSD FRML MDRD: 79 ML/MIN/1.7M2
GLUCOSE SERPL-MCNC: 90 MG/DL (ref 70–99)
HCT VFR BLD AUTO: 41.5 % (ref 40–53)
HGB BLD-MCNC: 13.2 G/DL (ref 13.3–17.7)
IMM GRANULOCYTES # BLD: 0 10E9/L (ref 0–0.4)
IMM GRANULOCYTES NFR BLD: 0 %
LYMPHOCYTES # BLD AUTO: 1.4 10E9/L (ref 0.8–5.3)
LYMPHOCYTES NFR BLD AUTO: 42.7 %
MCH RBC QN AUTO: 29.2 PG (ref 26.5–33)
MCHC RBC AUTO-ENTMCNC: 31.8 G/DL (ref 31.5–36.5)
MCV RBC AUTO: 92 FL (ref 78–100)
MONOCYTES # BLD AUTO: 0.3 10E9/L (ref 0–1.3)
MONOCYTES NFR BLD AUTO: 9.8 %
NEUTROPHILS # BLD AUTO: 1.4 10E9/L (ref 1.6–8.3)
NEUTROPHILS NFR BLD AUTO: 42.6 %
NRBC # BLD AUTO: 0 10*3/UL
NRBC BLD AUTO-RTO: 0 /100
PLATELET # BLD AUTO: 232 10E9/L (ref 150–450)
POTASSIUM SERPL-SCNC: 4.1 MMOL/L (ref 3.4–5.3)
PROT SERPL-MCNC: 7.4 G/DL (ref 6.8–8.8)
RBC # BLD AUTO: 4.52 10E12/L (ref 4.4–5.9)
SODIUM SERPL-SCNC: 137 MMOL/L (ref 133–144)
WBC # BLD AUTO: 3.3 10E9/L (ref 4–11)

## 2017-06-05 PROCEDURE — A9585 GADOBUTROL INJECTION: HCPCS | Performed by: INTERNAL MEDICINE

## 2017-06-05 PROCEDURE — 25000128 H RX IP 250 OP 636: Performed by: INTERNAL MEDICINE

## 2017-06-05 PROCEDURE — 74183 MRI ABD W/O CNTR FLWD CNTR: CPT

## 2017-06-05 RX ORDER — GADOBUTROL 604.72 MG/ML
10 INJECTION INTRAVENOUS ONCE
Status: COMPLETED | OUTPATIENT
Start: 2017-06-05 | End: 2017-06-05

## 2017-06-05 RX ADMIN — SODIUM CHLORIDE 100 ML: 9 INJECTION, SOLUTION INTRAVENOUS at 11:42

## 2017-06-05 RX ADMIN — GADOBUTROL 10 ML: 604.72 INJECTION INTRAVENOUS at 11:41

## 2017-06-08 LAB — CGA SERPL-MCNC: 53 NG/ML

## 2017-06-08 NOTE — PROGRESS NOTES
Oncology Follow-up Visit:  June 9, 2017    Diagnosis: [id number] Pancreatic head insulinoma.  No evidence of metastatic disease.     Treatment to date: Whipple resection December 8, 2016, by Dr. Matthias Armenta. Zero of 10 lymph nodes involved, grade 2, negative resection margins, Ki-67 equals 5%.  preoperative serum chromogranin A = 70 on 12/05/2016.     REFERRING PHYSICIAN:  Dr. Matthias Armenta, Hepatobiliary Surgery, [facility].          Oncology History:  Over a 2-3 year period, beginning in 2014 or so, he was having hypoglycemic episodes of unexplained origin.  He was even hospitalized for one such episode in the fall of 2016.  He met with a primary care physician, and subsequently an endocrinologist.  There was concern for hypoglycemic cause and concern for potential insulinoma.  On 10/31/2016, MRI at [facility] revealed a 3.9 x 3.7 x 3.6 cm mass in the head of the pancreas.  There was involvement of the superior mesenteric vein, but no evidence of metastatic disease noted.  He met with Dr. Migel Sena at [facility], and a referral was made for surgical evaluation.      On 11/11/2016, a CT abdomen and pelvis was performed revealing an enhancing lesion with a cystic component versus central necrosis in the head and the uncinate process of the pancreas.  There was an indeterminate portacaval lymph node measuring 2.1 cm.  Otherwise, no evidence of suspicious adenopathy in the abdomen.  EUS was performed by Dr. Guru Canas at the [facility] on 11/18/2016.  He performed an FNA.  This revealed the tumor was composed of grade 1 neuroendocrine tumor with a low proliferation marker of Ki-67 less than 2%.  The tumor was positive for synaptophysin, chromogranin and CD56.      On 12/01/2016, the patient underwent a preoperative octreotide scan that did not show any evidence of metastatic disease or tumor.  There was no radiotracer uptake.  A baseline chromogranin A was 70.      His C  peptide at baseline was 0.7.  Insulin level at the preoperative baseline was 2.2 and 3.5.  Proinsulin levels were less than 7.5, and a subsequent level was 9.6.     On 12/05/2016, the patient met with Dr. Matthias Armenta, who took the patient to the operating room on 12/08/2016 to perform a Whipple.  The pancreaticoduodenectomy resulted in the revealing of a pancreatic neuroendocrine tumor at the head of the pancreas that measured 5.1 cm with intermediate grade G2.  Margins were negative by 0.1 cm.  Zero of 12 lymph nodes were involved.  The Ki-67 registered at 5% with 4 mitoses/10 high-power fields.  There was no perivascular, nor perineural invasion.  The pathologic stage was T2.  The pathologic N stage was N0, and M0 per the prior octreotide scan.  The patient is currently 9 weeks out from surgery.    2/6/17 - - medical oncology consultation, Dr. Hanley.  6/5/17 - - MRI abdomen: IMPRESSION:   1. Postsurgical of Whipple procedure evidence of locally recurrent  neuroendocrine tumor.  2. 1.2 cm ill-defined hepatic lesion in the central liver. This is  indeterminate and could represent sequela of recent episode of  inflammation or infection. However, it is new from prior and  metastases are not excluded. Consider short-term follow-up MRI.  3. Mildly increased size of pericaval lymph node and unchanged mildly  enlarged randy hepatis lymph node, indeterminate but favored to be  reactive.  6/9/17 - - oncology follow-up, Dr. Hanley.        Interim History/History Of Present Illness:   Mr. Joey Guerrero is a 42-year-old  gentleman from [place], [place].  He was  diagnosed with pancreatic head insulinoma.      Mr. Guerrero returns to the clinic today alone.  He generally has been feeling well.  He denies any adverse symptoms.  Since he had an endocrine abnormality as sequelae from his primary pancreatic neuroendocrine tumor, he has been following with Dr. Eva Ortiz from our Endocrinology Team; the last visit was in mid-May.   His hemoglobin A1c was 5.8 and unremarkable.  He had a series of other tests that were reviewed by Dr. Ortiz.  Please refer to her note for further details.  The recent MRI abdomen that was done for surveillance showed no evidence of local recurrence at the site of the Whipple.  There was a new lesion that was 1.2 cm and rather a diffuse infiltrate.  I personally reviewed the MRI images with the patient today during the visit, and previous to this clinic evaluation.  The radiology report defined it as an indeterminate lesion that could represent an episode of inflammation or infection.  There  was no change in the chromogranin A.  The baseline was 70 as of 12/05, and the most recent chromogranin A was 55 on 05/10.  There was also a mild increase in the size of the paracaval lymph node.             Review Of Systems:  Full 12-point ROS reviewed. Pertinent symptoms reviewed above per HPI.    Past medical, social, surgical, and family histories reviewed.     PAST MEDICAL HISTORY:  Pancreatic neuroendocrine tumor, pathologic T2 N0 M0 per above, status post Whipple surgery on 12/08/2016.  Other surgeries notable include an appendectomy in 2007.      FAMILY HISTORY:  Denies any family history of malignancy.      SOCIAL HISTORY:  The patient lives in [place], [place], with his wife.  They have 2 boys, ages 8 and 4.  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    Tobacco:  Used to chew and smoke occasional cigarettes since the age of 18 until recently.    Alcohol:  Denies alcohol abuse.    Drugs:  Denies illicit drug use.     Allergies:  Allergies as of 06/09/2017 - Thaddeus as Reviewed 05/10/2017   Allergen Reaction Noted     Pollen extract  12/05/2016     Cat hair extract  12/05/2016       Current Medications:  Current Outpatient Prescriptions   Medication Sig Dispense Refill     amylase-lipase-protease (CREON 6) 6000 UNITS CPEP Take 1-2 capsules (6,000-12,000 Units) by mouth 3 times daily (with meals)  "450 capsule 3     calcium carbonate (TUMS) 500 MG chewable tablet Take 1-2 tablets (500-1,000 mg) by mouth 3 times daily as needed for heartburn 150 tablet 3     Ascorbic Acid (VITAMIN C PO) Take by mouth 2 times daily        albuterol (PROAIR HFA, PROVENTIL HFA, VENTOLIN HFA) 108 (90 BASE) MCG/ACT inhaler Inhale 1-2 puffs into the lungs every 6 hours as needed for shortness of breath / dyspnea or wheezing       fluticasone (FLOVENT HFA) 110 MCG/ACT inhaler Inhale 2 puffs into the lungs At Bedtime        multivitamin, therapeutic with minerals (MULTI-VITAMIN) TABS Take 1 tablet by mouth every morning        Omega-3 Fatty Acids (OMEGA-3 FISH OIL PO) Take 1 g by mouth 2 times daily (with meals) 3 capsules       Albuterol Sulfate (VENTOLIN IN) inhale  into the lungs.          Physical Exam:  /55  Pulse 52  Temp 97.4  F (36.3  C) (Oral)  Resp 12  Ht 1.877 m (6' 1.9\")  Wt 110.6 kg (243 lb 14.4 oz)  SpO2 99%  BMI 31.4 kg/m2  KPS:  90%.   GENERAL:  Very pleasant young adult  gentleman, in no acute distress, alert and oriented x3.   HEENT:  Anicteric sclerae. PERRLA, oropharynx clear with no mucositis or thrush.   LYMPH NODES:  No palpable pre/post-auricular, cervical, axillary, or inguinal lymphadenopathy appreciated.   CV:  RRR, normal S1 S2.  No murmurs, gallops, or rubs.   LUNGS:  Clear to auscultation bilaterally.  No dullness to percussion.   ABDOMEN:  Soft, nontender and nondistended.  Bowel sounds heard x4.  No apparent hepatosplenomegaly.    Broad/long midline scar.  No overlying fluctuance, erythema or tenderness, healing well.   EXTREMITIES:  No clubbing, cyanosis, or edema.           Laboratory/Imaging Studies  Orders Only on 06/05/2017   Component Date Value Ref Range Status     Sodium 06/05/2017 137  133 - 144 mmol/L Final     Potassium 06/05/2017 4.1  3.4 - 5.3 mmol/L Final     Chloride 06/05/2017 102  94 - 109 mmol/L Final     Carbon Dioxide 06/05/2017 25  20 - 32 mmol/L Final     Anion " Gap 06/05/2017 9  3 - 14 mmol/L Final     Glucose 06/05/2017 90  70 - 99 mg/dL Final     Urea Nitrogen 06/05/2017 30  7 - 30 mg/dL Final     Creatinine 06/05/2017 1.02  0.66 - 1.25 mg/dL Final     GFR Estimate 06/05/2017 79  >60 mL/min/1.7m2 Final    Non  GFR Calc     GFR Estimate If Black 06/05/2017   >60 mL/min/1.7m2 Final                    Value:>90   GFR Calc       Calcium 06/05/2017 9.0  8.5 - 10.1 mg/dL Final     Bilirubin Total 06/05/2017 0.6  0.2 - 1.3 mg/dL Final     Albumin 06/05/2017 3.8  3.4 - 5.0 g/dL Final     Protein Total 06/05/2017 7.4  6.8 - 8.8 g/dL Final     Alkaline Phosphatase 06/05/2017 41  40 - 150 U/L Final     ALT 06/05/2017 54  0 - 70 U/L Final     AST 06/05/2017 29  0 - 45 U/L Final     WBC 06/05/2017 3.3* 4.0 - 11.0 10e9/L Final     RBC Count 06/05/2017 4.52  4.4 - 5.9 10e12/L Final     Hemoglobin 06/05/2017 13.2* 13.3 - 17.7 g/dL Final     Hematocrit 06/05/2017 41.5  40.0 - 53.0 % Final     MCV 06/05/2017 92  78 - 100 fl Final     MCH 06/05/2017 29.2  26.5 - 33.0 pg Final     MCHC 06/05/2017 31.8  31.5 - 36.5 g/dL Final     RDW 06/05/2017 15.0  10.0 - 15.0 % Final     Platelet Count 06/05/2017 232  150 - 450 10e9/L Final     Diff Method 06/05/2017 Automated Method   Final     % Neutrophils 06/05/2017 42.6  % Final     % Lymphocytes 06/05/2017 42.7  % Final     % Monocytes 06/05/2017 9.8  % Final     % Eosinophils 06/05/2017 4.0  % Final     % Basophils 06/05/2017 0.9  % Final     % Immature Granulocytes 06/05/2017 0.0  % Final     Nucleated RBCs 06/05/2017 0  0 /100 Final     Absolute Neutrophil 06/05/2017 1.4* 1.6 - 8.3 10e9/L Final     Absolute Lymphocytes 06/05/2017 1.4  0.8 - 5.3 10e9/L Final     Absolute Monocytes 06/05/2017 0.3  0.0 - 1.3 10e9/L Final     Absolute Eosinophils 06/05/2017 0.1  0.0 - 0.7 10e9/L Final     Absolute Basophils 06/05/2017 0.0  0.0 - 0.2 10e9/L Final     Abs Immature Granulocytes 06/05/2017 0.0  0 - 0.4 10e9/L Final      Absolute Nucleated RBC 06/05/2017 0.0   Final      preoperative serum chromogranin A of 70 on 12/05/2016.   Results for YESENIA TORRES (MRN [id number]) as of 6/8/2017 15:45   Ref. Range 12/5/2016 16:14 5/10/2017 18:39   Chromogranin A Unknown 70 55           RADIOLOGY: I personally reviewed the images of the MRI abdomen prior to today's visit.  In addition, I reviewed the images personally with the patient.  I printed out a copy of the report for him of the MRI, and he took that home for his records.           ASSESSMENT/PLAN:  42-year-old gentleman with pancreatic head insulinoma, who presented with hypoglycemic episodes for several years prior to diagnosis.  He had Whipple surgery by Dr. Matthias Armenta on 12/08/2016.     We again generally reviewed the natural course, biology, diagnosis and treatment of pancreatic neuroendocrine tumors, and specifically focused on insulinomas.  We reviewed the difference between adenocarcinoma of the pancreas, which it is not, as compared to the 5-10% of pancreatic tumors that encompass neuroendocrine tumors in general.  He has a very specific subclass called insulinoma.      I suspect that based on my review of the MRI and on the radiologist's reports and discussion, that the insulinoma may be causing some metabolic inflammatory changes that could be manifesting on the most recent MRI.  I carefully reviewed the pathology from the Whipple resection that was done a little over 6 months ago.  I would consider that the patient's neuroendocrine tumor would be in the mild to possibly moderate risk category.  The Ki-67 is right on the border of the 5% line that would make the division between low risk versus potentially moderate risk.  However, other favorable factors that would favor a good prognosis long-term would be the lack of lymph node involvement, negative resection margins, and also grade 2; in other words, not grade 3 mitotic capability.      The patient had an excellent surgery  performed by Dr. Armenta, and that is not causing him any issues.  It is possible that he has been having some postoperative inflammation manifesting radiologically, but not symptomatically, even this many months out from surgery.  In addition, the chromogranin did drop compared to the baseline and did not rise.  For all those reasons, in addition to lack of LFT elevation, and most importantly lack of symptoms issues, I would favor continued followup as we have been doing, but I would do a closer surveillance.  MRI abdomen will be done in 3 months with a chromogranin check on the same day and CBC.  The patient will follow up within 1 week with me to review the results.      The patient's Hemoglobin A1c is normal at this time.  He follows up with Dr. Ortiz from Endocrinology, and his next scheduled followup with her is on 09/12.  He can do the MRI abdomen on the same day, and see me later that week to review the results.  In the meantime, I did  him very strongly that if he has any adverse symptoms that cannot be explained by other issues, to please call our clinic, and if there are any symptoms that concern me up to and including, but not limited to, jaundice, abdominal pain, change in bowel habits or others, that we could conceivably move up the scan earlier if we are concerned for recurrence.  However, it would be really unusual for a low-to-moderate risk pancreatic neuroendocrine tumor of this type to recur within 6-12 months of surgery.  Nonetheless, I cannot 100% say that this is not happening in this case.  We will go forward with close surveillance and followup as appropriate.               I spent 30 minutes in consultation including history, physical, and 20-25 minutes of discussion.   Over 50% of the time was spent counseling and coordinating care.    Brandan Hanley MD, PhD           cc:   Matthias Armenta MD            Hepatobiliary Surgery          [facility]  MD Missael          [facility]

## 2017-06-09 ENCOUNTER — ONCOLOGY VISIT (OUTPATIENT)
Dept: ONCOLOGY | Facility: CLINIC | Age: 42
End: 2017-06-09
Attending: INTERNAL MEDICINE
Payer: COMMERCIAL

## 2017-06-09 VITALS
HEART RATE: 52 BPM | HEIGHT: 74 IN | SYSTOLIC BLOOD PRESSURE: 126 MMHG | BODY MASS INDEX: 31.3 KG/M2 | TEMPERATURE: 97.4 F | DIASTOLIC BLOOD PRESSURE: 55 MMHG | OXYGEN SATURATION: 99 % | RESPIRATION RATE: 12 BRPM | WEIGHT: 243.9 LBS

## 2017-06-09 DIAGNOSIS — D3A.8 PRIMARY PANCREATIC NEUROENDOCRINE TUMOR (H): Primary | ICD-10-CM

## 2017-06-09 PROCEDURE — 99214 OFFICE O/P EST MOD 30 MIN: CPT | Mod: ZP | Performed by: INTERNAL MEDICINE

## 2017-06-09 PROCEDURE — 99212 OFFICE O/P EST SF 10 MIN: CPT | Mod: ZF

## 2017-06-09 RX ORDER — AZITHROMYCIN 250 MG/1
TABLET, FILM COATED ORAL
COMMUNITY
Start: 2016-11-14 | End: 2017-09-12

## 2017-06-09 RX ORDER — DIPHENOXYLATE HYDROCHLORIDE AND ATROPINE SULFATE 2.5; .025 MG/1; MG/1
TABLET ORAL
COMMUNITY
Start: 2016-11-15

## 2017-06-09 ASSESSMENT — PAIN SCALES - GENERAL: PAINLEVEL: NO PAIN (0)

## 2017-06-09 NOTE — LETTER
6/9/2017       RE: Joey Guerrero  [address] [place] [address]     Dear Colleague,    Thank you for referring your patient, Joey Guerrero, to the [facility]. Please see a copy of my visit note below.    Oncology Follow-up Visit:  June 9, 2017    Diagnosis: [id number] Pancreatic head insulinoma.  No evidence of metastatic disease.     Treatment to date: Whipple resection December 8, 2016, by Dr. Matthias Armenta. Zero of 10 lymph nodes involved, grade 2, negative resection margins, Ki-67 equals 5%.  preoperative serum chromogranin A = 70 on 12/05/2016.     REFERRING PHYSICIAN:  Dr. Matthias Armenta, Hepatobiliary Surgery, [facility].          Oncology History:  Over a 2-3 year period, beginning in 2014 or so, he was having hypoglycemic episodes of unexplained origin.  He was even hospitalized for one such episode in the fall of 2016.  He met with a primary care physician, and subsequently an endocrinologist.  There was concern for hypoglycemic cause and concern for potential insulinoma.  On 10/31/2016, MRI at [facility] revealed a 3.9 x 3.7 x 3.6 cm mass in the head of the pancreas.  There was involvement of the superior mesenteric vein, but no evidence of metastatic disease noted.  He met with Dr. Migel Sena at [facility], and a referral was made for surgical evaluation.      On 11/11/2016, a CT abdomen and pelvis was performed revealing an enhancing lesion with a cystic component versus central necrosis in the head and the uncinate process of the pancreas.  There was an indeterminate portacaval lymph node measuring 2.1 cm.  Otherwise, no evidence of suspicious adenopathy in the abdomen.  EUS was performed by Dr. Guru Canas at the [facility] on 11/18/2016.  He performed an FNA.  This revealed the tumor was composed of grade 1 neuroendocrine tumor with a low proliferation marker of Ki-67 less than 2%.  The tumor was positive for synaptophysin,  chromogranin and CD56.      On 12/01/2016, the patient underwent a preoperative octreotide scan that did not show any evidence of metastatic disease or tumor.  There was no radiotracer uptake.  A baseline chromogranin A was 70.      His C peptide at baseline was 0.7.  Insulin level at the preoperative baseline was 2.2 and 3.5.  Proinsulin levels were less than 7.5, and a subsequent level was 9.6.     On 12/05/2016, the patient met with Dr. Matthias Armenta, who took the patient to the operating room on 12/08/2016 to perform a Whipple.  The pancreaticoduodenectomy resulted in the revealing of a pancreatic neuroendocrine tumor at the head of the pancreas that measured 5.1 cm with intermediate grade G2.  Margins were negative by 0.1 cm.  Zero of 12 lymph nodes were involved.  The Ki-67 registered at 5% with 4 mitoses/10 high-power fields.  There was no perivascular, nor perineural invasion.  The pathologic stage was T2.  The pathologic N stage was N0, and M0 per the prior octreotide scan.  The patient is currently 9 weeks out from surgery.    2/6/17 - - medical oncology consultation, Dr. Hanley.  6/5/17 - - MRI abdomen: IMPRESSION:   1. Postsurgical of Whipple procedure evidence of locally recurrent  neuroendocrine tumor.  2. 1.2 cm ill-defined hepatic lesion in the central liver. This is  indeterminate and could represent sequela of recent episode of  inflammation or infection. However, it is new from prior and  metastases are not excluded. Consider short-term follow-up MRI.  3. Mildly increased size of pericaval lymph node and unchanged mildly  enlarged randy hepatis lymph node, indeterminate but favored to be  reactive.  6/9/17 - - oncology follow-up, Dr. Hanley.        Interim History/History Of Present Illness:   Mr. Joey Guerrero is a 42-year-old  gentleman from [place], [place].  He was  diagnosed with pancreatic head insulinoma.      Mr. Guerrero returns to the clinic today alone.  He generally has been feeling well.   He denies any adverse symptoms.  Since he had an endocrine abnormality as sequelae from his primary pancreatic neuroendocrine tumor, he has been following with Dr. Eva Ortiz from our Endocrinology Team; the last visit was in mid-May.  His hemoglobin A1c was 5.8 and unremarkable.  He had a series of other tests that were reviewed by Dr. Ortiz.  Please refer to her note for further details.  The recent MRI abdomen that was done for surveillance showed no evidence of local recurrence at the site of the Whipple.  There was a new lesion that was 1.2 cm and rather a diffuse infiltrate.  I personally reviewed the MRI images with the patient today during the visit, and previous to this clinic evaluation.  The radiology report defined it as an indeterminate lesion that could represent an episode of inflammation or infection.  There  was no change in the chromogranin A.  The baseline was 70 as of 12/05, and the most recent chromogranin A was 55 on 05/10.  There was also a mild increase in the size of the paracaval lymph node.             Review Of Systems:  Full 12-point ROS reviewed. Pertinent symptoms reviewed above per HPI.    Past medical, social, surgical, and family histories reviewed.     PAST MEDICAL HISTORY:  Pancreatic neuroendocrine tumor, pathologic T2 N0 M0 per above, status post Whipple surgery on 12/08/2016.  Other surgeries notable include an appendectomy in 2007.      FAMILY HISTORY:  Denies any family history of malignancy.      SOCIAL HISTORY:  The patient lives in [place], [place], with his wife.  They have 2 boys, ages 8 and 4.  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    Tobacco:  Used to chew and smoke occasional cigarettes since the age of 18 until recently.    Alcohol:  Denies alcohol abuse.    Drugs:  Denies illicit drug use.     Allergies:  Allergies as of 06/09/2017 - Thaddeus as Reviewed 05/10/2017   Allergen Reaction Noted     Pollen extract  12/05/2016     Cat hair  "extract  12/05/2016       Current Medications:  Current Outpatient Prescriptions   Medication Sig Dispense Refill     amylase-lipase-protease (CREON 6) 6000 UNITS CPEP Take 1-2 capsules (6,000-12,000 Units) by mouth 3 times daily (with meals) 450 capsule 3     calcium carbonate (TUMS) 500 MG chewable tablet Take 1-2 tablets (500-1,000 mg) by mouth 3 times daily as needed for heartburn 150 tablet 3     Ascorbic Acid (VITAMIN C PO) Take by mouth 2 times daily        albuterol (PROAIR HFA, PROVENTIL HFA, VENTOLIN HFA) 108 (90 BASE) MCG/ACT inhaler Inhale 1-2 puffs into the lungs every 6 hours as needed for shortness of breath / dyspnea or wheezing       fluticasone (FLOVENT HFA) 110 MCG/ACT inhaler Inhale 2 puffs into the lungs At Bedtime        multivitamin, therapeutic with minerals (MULTI-VITAMIN) TABS Take 1 tablet by mouth every morning        Omega-3 Fatty Acids (OMEGA-3 FISH OIL PO) Take 1 g by mouth 2 times daily (with meals) 3 capsules       Albuterol Sulfate (VENTOLIN IN) inhale  into the lungs.          Physical Exam:  /55  Pulse 52  Temp 97.4  F (36.3  C) (Oral)  Resp 12  Ht 1.877 m (6' 1.9\")  Wt 110.6 kg (243 lb 14.4 oz)  SpO2 99%  BMI 31.4 kg/m2  KPS:  90%.   GENERAL:  Very pleasant young adult  gentleman, in no acute distress, alert and oriented x3.   HEENT:  Anicteric sclerae. PERRLA, oropharynx clear with no mucositis or thrush.   LYMPH NODES:  No palpable pre/post-auricular, cervical, axillary, or inguinal lymphadenopathy appreciated.   CV:  RRR, normal S1 S2.  No murmurs, gallops, or rubs.   LUNGS:  Clear to auscultation bilaterally.  No dullness to percussion.   ABDOMEN:  Soft, nontender and nondistended.  Bowel sounds heard x4.  No apparent hepatosplenomegaly.    Broad/long midline scar.  No overlying fluctuance, erythema or tenderness, healing well.   EXTREMITIES:  No clubbing, cyanosis, or edema.           Laboratory/Imaging Studies  Orders Only on 06/05/2017   Component " Date Value Ref Range Status     Sodium 06/05/2017 137  133 - 144 mmol/L Final     Potassium 06/05/2017 4.1  3.4 - 5.3 mmol/L Final     Chloride 06/05/2017 102  94 - 109 mmol/L Final     Carbon Dioxide 06/05/2017 25  20 - 32 mmol/L Final     Anion Gap 06/05/2017 9  3 - 14 mmol/L Final     Glucose 06/05/2017 90  70 - 99 mg/dL Final     Urea Nitrogen 06/05/2017 30  7 - 30 mg/dL Final     Creatinine 06/05/2017 1.02  0.66 - 1.25 mg/dL Final     GFR Estimate 06/05/2017 79  >60 mL/min/1.7m2 Final    Non  GFR Calc     GFR Estimate If Black 06/05/2017   >60 mL/min/1.7m2 Final                    Value:>90   GFR Calc       Calcium 06/05/2017 9.0  8.5 - 10.1 mg/dL Final     Bilirubin Total 06/05/2017 0.6  0.2 - 1.3 mg/dL Final     Albumin 06/05/2017 3.8  3.4 - 5.0 g/dL Final     Protein Total 06/05/2017 7.4  6.8 - 8.8 g/dL Final     Alkaline Phosphatase 06/05/2017 41  40 - 150 U/L Final     ALT 06/05/2017 54  0 - 70 U/L Final     AST 06/05/2017 29  0 - 45 U/L Final     WBC 06/05/2017 3.3* 4.0 - 11.0 10e9/L Final     RBC Count 06/05/2017 4.52  4.4 - 5.9 10e12/L Final     Hemoglobin 06/05/2017 13.2* 13.3 - 17.7 g/dL Final     Hematocrit 06/05/2017 41.5  40.0 - 53.0 % Final     MCV 06/05/2017 92  78 - 100 fl Final     MCH 06/05/2017 29.2  26.5 - 33.0 pg Final     MCHC 06/05/2017 31.8  31.5 - 36.5 g/dL Final     RDW 06/05/2017 15.0  10.0 - 15.0 % Final     Platelet Count 06/05/2017 232  150 - 450 10e9/L Final     Diff Method 06/05/2017 Automated Method   Final     % Neutrophils 06/05/2017 42.6  % Final     % Lymphocytes 06/05/2017 42.7  % Final     % Monocytes 06/05/2017 9.8  % Final     % Eosinophils 06/05/2017 4.0  % Final     % Basophils 06/05/2017 0.9  % Final     % Immature Granulocytes 06/05/2017 0.0  % Final     Nucleated RBCs 06/05/2017 0  0 /100 Final     Absolute Neutrophil 06/05/2017 1.4* 1.6 - 8.3 10e9/L Final     Absolute Lymphocytes 06/05/2017 1.4  0.8 - 5.3 10e9/L Final     Absolute  Monocytes 06/05/2017 0.3  0.0 - 1.3 10e9/L Final     Absolute Eosinophils 06/05/2017 0.1  0.0 - 0.7 10e9/L Final     Absolute Basophils 06/05/2017 0.0  0.0 - 0.2 10e9/L Final     Abs Immature Granulocytes 06/05/2017 0.0  0 - 0.4 10e9/L Final     Absolute Nucleated RBC 06/05/2017 0.0   Final      preoperative serum chromogranin A of 70 on 12/05/2016.   Results for YESENIA TORRES (MRN [id number]) as of 6/8/2017 15:45   Ref. Range 12/5/2016 16:14 5/10/2017 18:39   Chromogranin A Unknown 70 55           RADIOLOGY: I personally reviewed the images of the MRI abdomen prior to today's visit.  In addition, I reviewed the images personally with the patient.  I printed out a copy of the report for him of the MRI, and he took that home for his records.           ASSESSMENT/PLAN:  42-year-old gentleman with pancreatic head insulinoma, who presented with hypoglycemic episodes for several years prior to diagnosis.  He had Whipple surgery by Dr. Matthias Armenta on 12/08/2016.     We again generally reviewed the natural course, biology, diagnosis and treatment of pancreatic neuroendocrine tumors, and specifically focused on insulinomas.  We reviewed the difference between adenocarcinoma of the pancreas, which it is not, as compared to the 5-10% of pancreatic tumors that encompass neuroendocrine tumors in general.  He has a very specific subclass called insulinoma.      I suspect that based on my review of the MRI and on the radiologist's reports and discussion, that the insulinoma may be causing some metabolic inflammatory changes that could be manifesting on the most recent MRI.  I carefully reviewed the pathology from the Whipple resection that was done a little over 6 months ago.  I would consider that the patient's neuroendocrine tumor would be in the mild to possibly moderate risk category.  The Ki-67 is right on the border of the 5% line that would make the division between low risk versus potentially moderate risk.  However,  other favorable factors that would favor a good prognosis long-term would be the lack of lymph node involvement, negative resection margins, and also grade 2; in other words, not grade 3 mitotic capability.      The patient had an excellent surgery performed by Dr. Armenta, and that is not causing him any issues.  It is possible that he has been having some postoperative inflammation manifesting radiologically, but not symptomatically, even this many months out from surgery.  In addition, the chromogranin did drop compared to the baseline and did not rise.  For all those reasons, in addition to lack of LFT elevation, and most importantly lack of symptoms issues, I would favor continued followup as we have been doing, but I would do a closer surveillance.  MRI abdomen will be done in 3 months with a chromogranin check on the same day and CBC.  The patient will follow up within 1 week with me to review the results.      The patient's Hemoglobin A1c is normal at this time.  He follows up with Dr. Ortiz from Endocrinology, and his next scheduled followup with her is on 09/12.  He can do the MRI abdomen on the same day, and see me later that week to review the results.  In the meantime, I did  him very strongly that if he has any adverse symptoms that cannot be explained by other issues, to please call our clinic, and if there are any symptoms that concern me up to and including, but not limited to, jaundice, abdominal pain, change in bowel habits or others, that we could conceivably move up the scan earlier if we are concerned for recurrence.  However, it would be really unusual for a low-to-moderate risk pancreatic neuroendocrine tumor of this type to recur within 6-12 months of surgery.  Nonetheless, I cannot 100% say that this is not happening in this case.  We will go forward with close surveillance and followup as appropriate.               I spent 30 minutes in consultation including history, physical,  and 20-25 minutes of discussion.   Over 50% of the time was spent counseling and coordinating care.    Brandan Hanley MD, PhD      cc:   Matthias Armenta MD            Hepatobiliary Surgery          [facility]                     Guru Missael MD          [facility]

## 2017-06-09 NOTE — MR AVS SNAPSHOT
After Visit Summary   6/9/2017    Joey Guerrero    MRN: [id number]           Patient Information     Date Of Birth          1975        Visit Information        Provider Department      6/9/2017 12:00 PM Brandan Hanley MD [facility]        Today's Diagnoses     Primary pancreatic neuroendocrine tumor    -  1       Follow-ups after your visit        Follow-up notes from your care team     Return in about 3 months (around 9/9/2017).      Your next 10 appointments already scheduled     Sep 12, 2017  2:00 PM CDT   MR ABDOMEN W/O & W CONTRAST with UUMR2   [facility], [place], MRI ([facility], [facility])    [address] [facility] [address] [phone]           Take your medicines as usual, unless your doctor tells you not to. Bring a list of your current medicines to your exam (including vitamins, minerals and over-the-counter drugs). Also bring the results of similar scans you may have had.    The day before your exam, drink extra fluids at least six 8-ounce glasses (unless your doctor tells you to restrict your fluids).   Have a blood test (creatinine test) within 30 days of your exam. Go to your clinic or Diagnostic Imaging Department for this test.   Do not eat or drink for 6 hours prior to exam.  The MRI machine uses a strong magnet. Please wear clothes without metal (snaps, zippers). A sweatsuit works well, or we may give you a hospital gown.  Please remove any body piercings and hair extensions before you arrive. You will also remove watches, jewelry, hairpins, wallets, dentures, partial dental plates and hearing aids. You may wear contact lenses, and you may be able to wear your rings. We have a safe place to keep your personal items, but it is safer to leave them at home.   **IMPORTANT** THE INSTRUCTIONS BELOW ARE ONLY FOR THOSE PATIENTS WHO HAVE BEEN TOLD THEY WILL RECEIVE SEDATION OR GENERAL ANESTHESIA DURING THEIR MRI  PROCEDURE:  IF YOU WILL RECEIVE SEDATION (take medicine to help you relax during your exam):   You must get the medicine from your doctor before you arrive. Bring the medicine to the exam. Do not take it at home.   Arrive one hour early. Bring someone who can take you home after the test. Your medicine will make you sleepy. After the exam, you may not drive, take a bus or take a taxi by yourself.   No eating 8 hours before your exam. You may have clear liquids up until 4 hours before your exam. (Clear liquids include water, clear tea, black coffee and fruit juice without pulp.)  IF YOU WILL RECEIVE ANESTHESIA (be asleep for your exam):   Arrive 1 1/2 hours early. Bring someone who can take you home after the test. You may not drive, take a bus or take a taxi by yourself.   No eating 8 hours before your exam. You may have clear liquids up until 4 hours before your exam. (Clear liquids include water, clear tea, black coffee and fruit juice without pulp.)  If you have any questions, please contact your Imaging Department exam site.            Sep 12, 2017  3:00 PM CDT   LAB with  LAB   [facility] ([facility])    [address] [place] [address] [phone]           Patient must bring picture ID.  Patient should be prepared to give a urine specimen  Please do not eat 10-12 hours before your appointment if you are coming in fasting for labs on lipids, cholesterol, or glucose (sugar).  Pregnant women should follow their Care Team instructions. Water with medications is okay. Do not drink coffee or other fluids.   If you have concerns about taking  your medications, please ask at office or if scheduling via Metaconomy, send a message by clicking on Secure Messaging, Message Your Care Team.            Sep 12, 2017  3:30 PM CDT   (Arrive by 3:15 PM)   RETURN ENDOCRINE with Eva Ortiz MD   [facility] ([facility])    [address] [place] [address]  "[address]  3rd Floor  [facility] [address]   [phone]            Sep 15, 2017 11:30 AM CDT   (Arrive by 11:15 AM)   Return Visit with Brandan Hanley MD   [facility] ([facility])    [address]  [address] [facility] [place] [address]   [phone]              Future tests that were ordered for you today     Open Future Orders        Priority Expected Expires Ordered    CBC with platelets differential Routine 9/5/2017 6/22/2018 6/9/2017    Chromogranin A Routine 9/5/2017 6/22/2018 6/9/2017    MRI Abdomen w & w/o contrast Routine 9/5/2017 6/22/2018 6/9/2017            Who to contact     If you have questions or need follow up information about today's clinic visit or your schedule please contact [facility] directly at [phone].  Normal or non-critical lab and imaging results will be communicated to you by Glydehart, letter or phone within 4 business days after the clinic has received the results. If you do not hear from us within 7 days, please contact the clinic through Glydehart or phone. If you have a critical or abnormal lab result, we will notify you by phone as soon as possible.  Submit refill requests through Black Lotus or call your pharmacy and they will forward the refill request to us. Please allow 3 business days for your refill to be completed.          Additional Information About Your Visit        GlydeharInfrascale Information     Black Lotus lets you send messages to your doctor, view your test results, renew your prescriptions, schedule appointments and more. To sign up, go to [email or web address] . Click on \"Log in\" on the left side of the screen, which will take you to the Welcome page. Then click on \"Sign up Now\" on the right side of the page.     You will be asked to enter the access code listed below, as well as some personal information. Please follow the directions to create your username and password.     Your access code is: " [email or web address]  Expires: 2017 12:22 PM     Your access code will  in 90 days. If you need help or a new code, please call your [facility] or [phone].        Care EveryWhere ID     This is your Care EveryWhere ID. This could be used by other organizations to access your [facility] medical records  [id number]        Your Vitals Were     Pulse Temperature Respirations Height Pulse Oximetry BMI (Body Mass Index)    52 97.4  F (36.3  C) (Oral) 12 1.877 m (6' 1.9\") 99% 31.4 kg/m2       Blood Pressure from Last 3 Encounters:   17 126/55   05/10/17 123/82   17 122/86    Weight from Last 3 Encounters:   17 110.6 kg (243 lb 14.4 oz)   05/10/17 113.9 kg (251 lb)   17 114 kg (251 lb 6.4 oz)               Primary Care Provider Office Phone # Fax #    Ken Piedra [phone]       [place] [address] [address] [facility] [address]        Thank you!     Thank you for choosing [facility]  for your care. Our goal is always to provide you with excellent care. Hearing back from our patients is one way we can continue to improve our services. Please take a few minutes to complete the written survey that you may receive in the mail after your visit with us. Thank you!             Your Updated Medication List - Protect others around you: Learn how to safely use, store and throw away your medicines at www.disposemymeds.org.          This list is accurate as of: 17 12:22 PM.  Always use your most recent med list.                   Brand Name Dispense Instructions for use    albuterol 108 (90 BASE) MCG/ACT Inhaler    PROAIR HFA/PROVENTIL HFA/VENTOLIN HFA     Inhale 1-2 puffs into the lungs every 6 hours as needed for shortness of breath / dyspnea or wheezing       amylase-lipase-protease 6000 UNITS Cpep    CREON 6    450 capsule    Take 1-2 capsules (6,000-12,000 Units) by mouth 3 times daily (with meals)       azithromycin 250 MG tablet    ZITHROMAX     " Take by mouth. Take two tablets on the first day, and take one tablet each day on days 2-5       calcium carbonate 500 MG chewable tablet    TUMS    150 tablet    Take 1-2 tablets (500-1,000 mg) by mouth 3 times daily as needed for heartburn       fluticasone 110 MCG/ACT Inhaler    FLOVENT HFA     Inhale 2 puffs into the lungs At Bedtime       Multi-vitamin Tabs tablet      Take 1 tablet by mouth every morning       MULTI-VITAMINS Tabs          OMEGA-3 FISH OIL PO      Take 1 g by mouth 2 times daily (with meals) 3 capsules       * UNABLE TO FIND          * UNABLE TO FIND          * UNABLE TO FIND          VENTOLIN IN      inhale  into the lungs.       VITAMIN C PO      Take by mouth 2 times daily       * Notice:  This list has 3 medication(s) that are the same as other medications prescribed for you. Read the directions carefully, and ask your doctor or other care provider to review them with you.

## 2017-09-12 ENCOUNTER — OFFICE VISIT (OUTPATIENT)
Dept: ENDOCRINOLOGY | Facility: CLINIC | Age: 42
End: 2017-09-12

## 2017-09-12 ENCOUNTER — HOSPITAL ENCOUNTER (OUTPATIENT)
Dept: MRI IMAGING | Facility: CLINIC | Age: 42
Discharge: HOME | End: 2017-09-12
Attending: INTERNAL MEDICINE | Admitting: INTERNAL MEDICINE
Payer: COMMERCIAL

## 2017-09-12 ENCOUNTER — APPOINTMENT (OUTPATIENT)
Dept: LAB | Facility: CLINIC | Age: 42
End: 2017-09-12
Attending: INTERNAL MEDICINE
Payer: COMMERCIAL

## 2017-09-12 VITALS
BODY MASS INDEX: 29.56 KG/M2 | DIASTOLIC BLOOD PRESSURE: 78 MMHG | WEIGHT: 230.3 LBS | HEART RATE: 85 BPM | HEIGHT: 74 IN | SYSTOLIC BLOOD PRESSURE: 113 MMHG

## 2017-09-12 DIAGNOSIS — D3A.8 PRIMARY PANCREATIC NEUROENDOCRINE TUMOR (H): ICD-10-CM

## 2017-09-12 DIAGNOSIS — R73.03 PRE-DIABETES: ICD-10-CM

## 2017-09-12 DIAGNOSIS — D13.7 INSULINOMA: Primary | ICD-10-CM

## 2017-09-12 DIAGNOSIS — D13.7 INSULINOMA: ICD-10-CM

## 2017-09-12 LAB
BASOPHILS # BLD AUTO: 0 10E9/L (ref 0–0.2)
BASOPHILS NFR BLD AUTO: 0.7 %
DIFFERENTIAL METHOD BLD: ABNORMAL
EOSINOPHIL # BLD AUTO: 0.2 10E9/L (ref 0–0.7)
EOSINOPHIL NFR BLD AUTO: 4.9 %
ERYTHROCYTE [DISTWIDTH] IN BLOOD BY AUTOMATED COUNT: 13.3 % (ref 10–15)
GLUCOSE SERPL-MCNC: 88 MG/DL (ref 70–99)
HBA1C MFR BLD: 5.1 % (ref 4.3–6)
HCT VFR BLD AUTO: 34.2 % (ref 40–53)
HGB BLD-MCNC: 11.5 G/DL (ref 13.3–17.7)
IMM GRANULOCYTES # BLD: 0 10E9/L (ref 0–0.4)
IMM GRANULOCYTES NFR BLD: 0 %
LYMPHOCYTES # BLD AUTO: 1.5 10E9/L (ref 0.8–5.3)
LYMPHOCYTES NFR BLD AUTO: 35.5 %
MCH RBC QN AUTO: 32.2 PG (ref 26.5–33)
MCHC RBC AUTO-ENTMCNC: 33.6 G/DL (ref 31.5–36.5)
MCV RBC AUTO: 96 FL (ref 78–100)
MONOCYTES # BLD AUTO: 0.3 10E9/L (ref 0–1.3)
MONOCYTES NFR BLD AUTO: 6.8 %
NEUTROPHILS # BLD AUTO: 2.1 10E9/L (ref 1.6–8.3)
NEUTROPHILS NFR BLD AUTO: 52.1 %
NRBC # BLD AUTO: 0 10*3/UL
NRBC BLD AUTO-RTO: 0 /100
PLATELET # BLD AUTO: 189 10E9/L (ref 150–450)
RBC # BLD AUTO: 3.57 10E12/L (ref 4.4–5.9)
WBC # BLD AUTO: 4.1 10E9/L (ref 4–11)

## 2017-09-12 PROCEDURE — 74183 MRI ABD W/O CNTR FLWD CNTR: CPT

## 2017-09-12 PROCEDURE — 25000128 H RX IP 250 OP 636: Performed by: INTERNAL MEDICINE

## 2017-09-12 PROCEDURE — A9585 GADOBUTROL INJECTION: HCPCS | Performed by: INTERNAL MEDICINE

## 2017-09-12 RX ORDER — GADOBUTROL 604.72 MG/ML
10 INJECTION INTRAVENOUS ONCE
Status: COMPLETED | OUTPATIENT
Start: 2017-09-12 | End: 2017-09-12

## 2017-09-12 RX ADMIN — GADOBUTROL 10 ML: 604.72 INJECTION INTRAVENOUS at 14:03

## 2017-09-12 NOTE — MR AVS SNAPSHOT
After Visit Summary   9/12/2017    Joey Guerrero    MRN: [id number]           Patient Information     Date Of Birth          1975        Visit Information        Provider Department      9/12/2017 3:30 PM Eva Ortiz MD [facility]        Today's Diagnoses     Insulinoma    -  1    Pre-diabetes           Follow-ups after your visit        Follow-up notes from your care team     Return in about 6 months (around 3/12/2018).      Your next 10 appointments already scheduled     Jan 16, 2018 11:30 AM CST   (Arrive by 11:15 AM)   MR ABDOMEN W/O & W CONTRAST with [id number]   [facility] [place] [facility] ([facility] [place])    [address] [address] [phone]           Take your medicines as usual, unless your doctor tells you not to. Bring a list of your current medicines to your exam (including vitamins, minerals and over-the-counter drugs). Also bring the results of similar scans you may have had.    The day before your exam, drink extra fluids at least six 8-ounce glasses (unless your doctor tells you to restrict your fluids).   Have a blood test (creatinine test) within 30 days of your exam. Go to your clinic or Diagnostic Imaging Department for this test.   Do not eat or drink for 6 hours prior to exam.  The MRI machine uses a strong magnet. Please wear clothes without metal (snaps, zippers). A sweatsuit works well, or we may give you a hospital gown.  Please remove any body piercings and hair extensions before you arrive. You will also remove watches, jewelry, hairpins, wallets, dentures, partial dental plates and hearing aids. You may wear contact lenses, and you may be able to wear your rings. We have a safe place to keep your personal items, but it is safer to leave them at home.   **IMPORTANT** THE INSTRUCTIONS BELOW ARE ONLY FOR THOSE PATIENTS WHO HAVE BEEN TOLD THEY WILL RECEIVE SEDATION OR GENERAL ANESTHESIA DURING  THEIR MRI PROCEDURE:  IF YOU WILL RECEIVE SEDATION (take medicine to help you relax during your exam):   You must get the medicine from your doctor before you arrive. Bring the medicine to the exam. Do not take it at home.   Arrive one hour early. Bring someone who can take you home after the test. Your medicine will make you sleepy. After the exam, you may not drive, take a bus or take a taxi by yourself.   No eating 8 hours before your exam. You may have clear liquids up until 4 hours before your exam. (Clear liquids include water, clear tea, black coffee and fruit juice without pulp.)  IF YOU WILL RECEIVE ANESTHESIA (be asleep for your exam):   Arrive 1 1/2 hours early. Bring someone who can take you home after the test. You may not drive, take a bus or take a taxi by yourself.   No eating 8 hours before your exam. You may have clear liquids up until 4 hours before your exam. (Clear liquids include water, clear tea, black coffee and fruit juice without pulp.)  If you have any questions, please contact your Imaging Department exam site.            Jan 16, 2018 12:15 PM CST   LAB with  LAB   [facility] ([facility])    [address] [place] [address] [phone]           Patient must bring picture ID. Patient should be prepared to give a urine specimen  Please do not eat 10-12 hours before your appointment if you are coming in fasting for labs on lipids, cholesterol, or glucose (sugar). Pregnant women should follow their Care Team instructions. Water with medications is okay. Do not drink coffee or other fluids. If you have concerns about taking  your medications, please ask at office or if scheduling via [employer], send a message by clicking on Secure Messaging, Message Your Care Team.            Jan 19, 2018 11:30 AM CST   (Arrive by 11:15 AM)   Return Visit with Brandan Hanley MD   [facility] ([facility])    [address] [facility] [id number] [phone]            Mar 12, 2018  3:40 PM CDT   (Arrive by 3:25 PM)   RETURN ENDOCRINE with Eva Ortiz MD   [facility] ([facility])    [address]  [address] [facility] [id number] [phone]              Who to contact     Please call your clinic at [phone] to:    Ask questions about your health    Make or cancel appointments    Discuss your medicines    Learn about your test results    Speak to your doctor   If you have compliments or concerns about an experience at your clinic, or if you wish to file a complaint, please contact [facility] Patient Relations at [phone] or email us at [email or web address]         Additional Information About Your Visit        [employer] Information     GoFish is an electronic gateway that provides easy, online access to your medical records. With GoFish, you can request a clinic appointment, read your test results, renew a prescription or communicate with your care team.     To sign up for GoFish visit the website at [email or web address]   You will be asked to enter the access code listed below, as well as some personal information. Please follow the directions to create your username and password.     Your access code is: [email or web address]  Expires: 2017  8:57 AM     Your access code will  in 90 days. If you need help or a new code, please contact your [facility] or call [phone] for assistance.        Care EveryWhere ID     This is your Care EveryWhere ID. This could be used by other organizations to access your [facility] medical records  [id number]        Your Vitals Were     Pulse Height BMI (Body Mass Index)             85 1.88 m (6' 2\") 29.57 kg/m2          Blood Pressure from Last 3 Encounters:   09/15/17 120/84   17 113/78   17 126/55    Weight " from Last 3 Encounters:   09/15/17 105.8 kg (233 lb 4.8 oz)   09/12/17 104.5 kg (230 lb 4.8 oz)   06/09/17 110.6 kg (243 lb 14.4 oz)                 Today's Medication Changes          These changes are accurate as of: 9/12/17 11:59 PM.  If you have any questions, ask your nurse or doctor.               Stop taking these medicines if you haven't already. Please contact your care team if you have questions.     azithromycin 250 MG tablet   Commonly known as:  ZITHROMAX   Stopped by:  Eva Ortiz MD           calcium carbonate 500 MG chewable tablet   Commonly known as:  TUMS   Stopped by:  Eva Ortiz MD           Multi-vitamin Tabs tablet   Stopped by:  Eva Ortiz MD           UNABLE TO FIND   Stopped by:  Eva Ortiz MD           VENTOLIN IN   Stopped by:  Eva Ortiz MD                    Primary Care Provider Office Phone # Fax #    Ken MD Tadeo [phone]       [place] [address] [address] [facility] [address]        Equal Access to Services     [facility]: Hadii isi alonzo hadasho Soketty, waaxda luqadaha, qaybta kaalmada adekaylynnyabatsheva, margo donohue . So [facility] [phone].    ATENCIÓN: Si habla español, tiene a wong disposición servicios gratuitos de asistencia lingüística. [facility] [phone].    We comply with applicable federal civil rights laws and [place] laws. We do not discriminate on the basis of race, color, national origin, age, disability sex, sexual orientation or gender identity.            Thank you!     Thank you for choosing [facility]  for your care. Our goal is always to provide you with excellent care. Hearing back from our patients is one way we can continue to improve our services. Please take a few minutes to complete the written survey that you may receive in the mail after your visit with us. Thank you!             Your Updated Medication List - Protect others around you: Learn how to  safely use, store and throw away your medicines at www.disposemymeds.org.          This list is accurate as of: 9/12/17 11:59 PM.  Always use your most recent med list.                   Brand Name Dispense Instructions for use Diagnosis    albuterol 108 (90 BASE) MCG/ACT Inhaler    PROAIR HFA/PROVENTIL HFA/VENTOLIN HFA     Inhale 1-2 puffs into the lungs every 6 hours as needed for shortness of breath / dyspnea or wheezing        amylase-lipase-protease 6000 UNITS Cpep    CREON 6    450 capsule    Take 1-2 capsules (6,000-12,000 Units) by mouth 3 times daily (with meals)    Insulinoma       ASPIRIN PO      Take 81 mg by mouth        fluticasone 110 MCG/ACT Inhaler    FLOVENT HFA     Inhale 2 puffs into the lungs At Bedtime        MULTI-VITAMINS Tabs           OMEGA-3 FISH OIL PO      Take 1 g by mouth 2 times daily (with meals) 3 capsules        PROBIOTIC ADVANCED Caps      Take by mouth daily        vitamin B complex with vitamin C Tabs tablet      Take 1 tablet by mouth daily        VITAMIN C PO      Take by mouth 2 times daily        VITAMIN D-3 PO      Take by mouth daily Pt unsure of dosage

## 2017-09-12 NOTE — PROGRESS NOTES
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 Tumor was 5.1 cm, not pathologically  malignant (as defined by distant mets, vascular or capsular invasion).  This does not fully exclude malignancy.   pT2, pNo, pMx, stage IB assuming no distant mets.   There were no elevated  markers measured prior to surgery , which will help us detect recurrence, other than the hypoglycemia/ hyperinsulinemia The primary tumor uptake of octreotide was not zero but it appeared quite low on the octreoscan. The proinsulin seemed to be the same with fasting and non-fasting.     He no longer has symptoms suggestive of hypoglycemia. He is on a very low/ no carbohydrate diet chronically.   I have noted that think that the proinsulin might be a useful tumor marker for him.  We will follow this (always in the context of glucose and c peptide)    S/p Whipple procedure -   on treatment of pancreatic exocrine replacement.    Pre-diabetes HgbA1c of 5.8% 5/10/17, now down to 5.1% on 9/12/1.  He is  on a very low/no CHO diet.     Hilda Ortiz MD      Cc/HISTORY OF PRESENT ILLNESS  42 year old man presents for follow up of history of insulinoma. See my 5/10/17 note for description of the presentation and diagnostic tests     His treatment course has been as follows:  11/18/16 FNA -EUS pancreatic head mass was positive for malignancy - neuroendocrine tumor.    On 12/8/16 he was treated with open nonpylorus preserving Whipple procedure with cholecystectomy and feeding jejunostomy.   Surgical pathology shows pancreatic neuroendocrine tumor (5.1 cm) , intermediate grade (G2).  0/10 LN negative.  KI -67 in some areas of tumor up to 5% ( the synoptic states there were 10 mitoses /10 HPF, no LV invasion.  No IHC was performed .    Since the surgery he is no longer having the spells .  He no longer has to eat to maintain. He has lost  35-40#.  He is on a very low/ no carbohydrate diet.  He is also following with oncology, Dr Brandan Hanley, with whom he has upcoming appointment, and who has been ordering MRI.      We have the following imaging studies:   10[facility]16[facility] 3.9 cm mass pancreas with central necrosis. Mass encases 50% of adjacent SMV which remains patent. - Liver unremarkable.   11/11/16 CT abdomen: enhancing lesion with cystic component or central necrosis within head and uncinate process of pancreas concerning for malignancy.  1 cm indeterminate portacaval LN.   12/1/16 octreoscan: read as negative; I think the study shows very faint activity at the pancreatic mass site.  6/5/17 MRI abdomen: ? Liver mass  9/21/17  MRI abdomen: negative    REVIEW OF SYSTEMS  Further weight loss -   Can't eat that much , gets full quickly  No sweating  Sleep at night is OK  Cardiac: Negative  GI: negative  No spells  Brain working OK  No headaches.    Past Medical History  Past Medical History:   Diagnosis Date     Hypoglycemia     due to insulinoma     Migraine      Pancreatic neuroendocrine tumor- insulinoma 2016     Ruptured appendix 2003     Tinea versicolor      Uncomplicated asthma         Past Surgical History:   Procedure Laterality Date     APPENDECTOMY       ENDOSCOPIC ULTRASOUND UPPER GASTROINTESTINAL TRACT (GI) N/A 11/18/2016    Procedure: ENDOSCOPIC ULTRASOUND, ESOPHAGOSCOPY / UPPER GASTROINTESTINAL TRACT (GI);  Surgeon: Guru Malathi Canas MD;  Location: [facility] OR     PICC INSERTION       WHIPPLE PROCEDURE N/A 12/8/2016    Procedure: WHIPPLE PROCEDURE;  Surgeon: Matthias Armenta MD;  Location: [facility] OR       Medications  Current Outpatient Prescriptions   Medication     Cholecalciferol (VITAMIN D-3 PO)     vitamin B complex with vitamin C (VITAMIN  B COMPLEX) TABS tablet     Probiotic Product (PROBIOTIC ADVANCED) CAPS     ASPIRIN PO     Multiple Vitamin (MULTI-VITAMINS) TABS      amylase-lipase-protease (CREON 6) 6000 UNITS CPEP     Ascorbic Acid (VITAMIN C PO)     albuterol (PROAIR HFA, PROVENTIL HFA, VENTOLIN HFA) 108 (90 BASE) MCG/ACT inhaler     fluticasone (FLOVENT HFA) 110 MCG/ACT inhaler     Omega-3 Fatty Acids (OMEGA-3 FISH OIL PO)     No current facility-administered medications for this visit.        Current Outpatient Prescriptions   Medication Sig Dispense Refill     Cholecalciferol (VITAMIN D-3 PO) Take by mouth daily Pt unsure of dosage       vitamin B complex with vitamin C (VITAMIN  B COMPLEX) TABS tablet Take 1 tablet by mouth daily       Probiotic Product (PROBIOTIC ADVANCED) CAPS Take by mouth daily       ASPIRIN PO Take 81 mg by mouth       Multiple Vitamin (MULTI-VITAMINS) TABS        amylase-lipase-protease (CREON 6) 6000 UNITS CPEP Take 1-2 capsules (6,000-12,000 Units) by mouth 3 times daily (with meals) 450 capsule 3     Ascorbic Acid (VITAMIN C PO) Take by mouth 2 times daily        albuterol (PROAIR HFA, PROVENTIL HFA, VENTOLIN HFA) 108 (90 BASE) MCG/ACT inhaler Inhale 1-2 puffs into the lungs every 6 hours as needed for shortness of breath / dyspnea or wheezing       fluticasone (FLOVENT HFA) 110 MCG/ACT inhaler Inhale 2 puffs into the lungs At Bedtime        Omega-3 Fatty Acids (OMEGA-3 FISH OIL PO) Take 1 g by mouth 2 times daily (with meals) 3 capsules       Allergies  Allergies   Allergen Reactions     Pollen Extract      Other reaction(s): Runny Nose     Cat Hair Extract      Other reaction(s): Runny Nose     Family History  family history includes Thyroid Disease in his sister. There is no history of Pancreatic Cancer, Nephrolithiasis, Brain Tumor, or DIABETES.   No pancreatic tumors  Mom and dad alive and well    Social History  Social History   Substance Use Topics     Smoking status: Never Smoker     Smokeless tobacco: Not on file      Comment: 1/2 can daily     Alcohol use Yes      Comment: 6-7 beers every 2-3 weeks      .  Works nights 630 PM to  "230 AM shift; gets up at 11 AM; .  Lifts weights, runs  Diet doesn't include carbohydrate -     Physical Exam  /78 (BP Location: Right arm, Patient Position: Sitting, Cuff Size: Adult Large)  Pulse 85  Ht 1.88 m (6' 2\")  Wt 104.5 kg (230 lb 4.8 oz)  BMI 29.57 kg/m2  Body mass index is 29.57 kg/(m^2).  GENERAL :  Large man In no apparent distress  SKIN: Normal color, normal temperature, texture.  No  purple striae.     EYES: PERRL, EOMI, No scleral icterus,  No proptosis, conjunctival redness, stare, retraction  MOUTH: Moist, pink; pharynx clear  NECK: No visible masses. No palpable adenopathy, or masses. No carotid bruits.   THYROID:  Not palpable  RESP: Lungs clear to auscultation bilaterally  CARDIAC: Regular rate and rhythm, normal S1 S2, without murmurs, rubs or gallops    ABDOMEN: Normal bowel sounds; soft, nontender, no HSM or masses       NEURO: awake, alert, responds appropriately to questions.  Cranial nerves intact.  Moves all extremities; Gait normal.  No tremor of the outstretched hand.  DTRs   1/4 ,   EXTREMITIES: No clubbing, cyanosis or edema.    DATA REVIEW    Results for YESENIA TORRES (MRN [id number]) as of 9/21/2017 07:28   Ref. Range 9/12/2017 15:39   Hemoglobin A1C Latest Ref Range: 4.3 - 6.0 % 5.1   C-Peptide Latest Ref Range: 0.9 - 6.9 ng/mL 0.5 (L)   Chromogranin A Latest Ref Range: 0 - 95 ng/mL 62   Proinsulin Latest Ref Range: <=8.0 pmol/L <1.6   Glucose Latest Ref Range: 70 - 99 mg/dL 88   WBC Latest Ref Range: 4.0 - 11.0 10e9/L 4.1   Hemoglobin Latest Ref Range: 13.3 - 17.7 g/dL 11.5 (L)   Hematocrit Latest Ref Range: 40.0 - 53.0 % 34.2 (L)   Platelet Count Latest Ref Range: 150 - 450 10e9/L 189   RBC Count Latest Ref Range: 4.4 - 5.9 10e12/L 3.57 (L)   MCV Latest Ref Range: 78 - 100 fl 96   MCH Latest Ref Range: 26.5 - 33.0 pg 32.2   MCHC Latest Ref Range: 31.5 - 36.5 g/dL 33.6   RDW Latest Ref Range: 10.0 - 15.0 % 13.3   Diff Method Unknown Automated Method   % Neutrophils " Latest Units: % 52.1   % Lymphocytes Latest Units: % 35.5   % Monocytes Latest Units: % 6.8   % Eosinophils Latest Units: % 4.9   % Basophils Latest Units: % 0.7   % Immature Granulocytes Latest Units: % 0.0   Nucleated RBCs Latest Ref Range: 0 /100 0   Absolute Neutrophil Latest Ref Range: 1.6 - 8.3 10e9/L 2.1   Absolute Lymphocytes Latest Ref Range: 0.8 - 5.3 10e9/L 1.5   Absolute Monocytes Latest Ref Range: 0.0 - 1.3 10e9/L 0.3   Absolute Eosinophils Latest Ref Range: 0.0 - 0.7 10e9/L 0.2   Absolute Basophils Latest Ref Range: 0.0 - 0.2 10e9/L 0.0   Abs Immature Granulocytes Latest Ref Range: 0 - 0.4 10e9/L 0.0   Absolute Nucleated RBC Unknown 0.0     Results for YESENIA TORRES (MRN [id number]) as of 9/12/2017 10:05   Ref. Range 5/10/2017 18:39 5/10/2017 18:40 6/5/2017 10:51   Sodium Latest Ref Range: 133 - 144 mmol/L   137   Potassium Latest Ref Range: 3.4 - 5.3 mmol/L   4.1   Chloride Latest Ref Range: 94 - 109 mmol/L   102   Carbon Dioxide Latest Ref Range: 20 - 32 mmol/L   25   Urea Nitrogen Latest Ref Range: 7 - 30 mg/dL   30   Creatinine Latest Ref Range: 0.66 - 1.25 mg/dL   1.02   GFR Estimate Latest Ref Range: >60 mL/min/1.7m2   79   GFR Estimate If Black Latest Ref Range: >60 mL/min/1.7m2   >90...   Calcium Latest Ref Range: 8.5 - 10.1 mg/dL   9.0   Anion Gap Latest Ref Range: 3 - 14 mmol/L   9   Albumin Latest Ref Range: 3.4 - 5.0 g/dL   3.8   Protein Total Latest Ref Range: 6.8 - 8.8 g/dL   7.4   Bilirubin Total Latest Ref Range: 0.2 - 1.3 mg/dL   0.6   Alkaline Phosphatase Latest Ref Range: 40 - 150 U/L   41   ALT Latest Ref Range: 0 - 70 U/L   54   AST Latest Ref Range: 0 - 45 U/L   29   Hemoglobin A1C Latest Ref Range: 4.3 - 6.0 % 5.8     Adrenal Corticotropin Latest Ref Range: <47 pg/mL  28    C-Peptide Latest Ref Range: 0.9 - 6.9 ng/mL 1.1     Chromogranin A Unknown 55  53   Cortisol Serum Latest Ref Range: 4 - 22 ug/dL 12.4     Proinsulin Unknown 1.7     Glucose Latest Ref Range: 70 - 99 mg/dL 94   90   WBC Latest Ref Range: 4.0 - 11.0 10e9/L   3.3 (L)   Hemoglobin Latest Ref Range: 13.3 - 17.7 g/dL   13.2 (L)       MRI ABDOMEN     CLINICAL HISTORY:  Pancreatic head insulinoma. Whipple resection  December 8, 2016. No evidence for metastatic disease. Follow-up study.     TECHNIQUE:  Images were acquired with and without intravenous contrast  through the abdomen. The following MR images were acquired: TrueFISP,  multiplanar T2 weighted, axial T1 in/out of phase, axial fat-saturated  T1, diffusion-weighted. Multiplanar T1-weighted images with fat  saturation were before contrast administration and at multiple time  points following the administration of intravenous contrast. Contrast  dose: 10 ml Gadavist injected     FINDINGS:     Comparison study: 6/5/2017.     Liver: Homogeneous liver parenchyma. No significant hepatic steatosis.  No significant iron deposition. Previously described lesion in the  central liver near the IVC is not well characterized in the present  study. No intra or extrahepatic biliary dilatation.     Gallbladder: Surgically absent.     Spleen: The spleen is not enlarged. No focal lesions.     Kidneys: Kidneys are normal in size and position. No hydronephrosis.     Adrenal glands: Unremarkable.     Pancreas: Postsurgical changes of Whipple procedure. The remaining of  the pancreatic parenchyma is mildly atrophic with minimal decreased  precontrast T1 signal throughout. No focal lesions. Main pancreatic  duct with normal diameter.     Bowel: No dilated loops of bowel.     Lymph nodes: Prominent retroperitoneal and mesenteric lymph nodes, not  enlarged by size criteria, likely reactive.     Blood vessels: Hepatic venous system and portal venous system are  patent. No abdominal aortic aneurysm.     Lung bases: No pleural effusion. No pericardial effusion. Cardiac size  within normal limits. Mild bilateral lower lobes dependent  atelectasis.     Bones and soft tissues: Degenerative changes of  the spine. No  suspicious lesions in the bones.     Mesentery and abdominal wall: Postsurgical changes.     Ascites: No ascites.         IMPRESSION: In this patient with history of pancreatic head  insulinoma, post Whipple resection there is no evidence for metastatic  disease within the abdomen. Previously described lesion in the central  liver near the IVC is not well characterized in the present study.     I have personally reviewed the examination and initial interpretation  and I agree with the findings.     ALIRIO VANG MD

## 2017-09-12 NOTE — NURSING NOTE
"Chief Complaint   Patient presents with     RECHECK     INSULINOMA F/U     Blood Draw     labs drawn        Initial /78 (BP Location: Right arm, Patient Position: Sitting, Cuff Size: Adult Large)  Pulse 85  Ht 1.88 m (6' 2\")  Wt 104.5 kg (230 lb 4.8 oz)  BMI 29.57 kg/m2 Estimated body mass index is 29.57 kg/(m^2) as calculated from the following:    Height as of this encounter: 1.88 m (6' 2\").    Weight as of this encounter: 104.5 kg (230 lb 4.8 oz).  Medication Reconciliation: complete    "

## 2017-09-12 NOTE — LETTER
9/12/2017       RE: Joey Guerrero  [address] [place] [address]     Dear Colleague,    Thank you for referring your patient, Joey Guerrero, to the [facility] ENDOCRINOLOGY at [facility]. Please see a copy of my visit note below.    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 Tumor was 5.1 cm, not pathologically  malignant (as defined by distant mets, vascular or capsular invasion).  This does not fully exclude malignancy.   pT2, pNo, pMx, stage IB assuming no distant mets.   There were no elevated  markers measured prior to surgery , which will help us detect recurrence, other than the hypoglycemia/ hyperinsulinemia The primary tumor uptake of octreotide was not zero but it appeared quite low on the octreoscan. The proinsulin seemed to be the same with fasting and non-fasting.     He no longer has symptoms suggestive of hypoglycemia. He is on a very low/ no carbohydrate diet chronically.   I have noted that think that the proinsulin might be a useful tumor marker for him.  We will follow this (always in the context of glucose and c peptide)    S/p Whipple procedure -   on treatment of pancreatic exocrine replacement.    Pre-diabetes HgbA1c of 5.8% 5/10/17, now down to 5.1% on 9/12/1.  He is  on a very low/no CHO diet.     Hilda Ortiz MD      Cc/HISTORY OF PRESENT ILLNESS  42 year old man presents for follow up of history of insulinoma. See my 5/10/17 note for description of the presentation and diagnostic tests     His treatment course has been as follows:  11/18/16 FNA -EUS pancreatic head mass was positive for malignancy - neuroendocrine tumor.    On 12/8/16 he was treated with open nonpylorus preserving Whipple procedure with cholecystectomy and feeding jejunostomy.   Surgical pathology shows pancreatic neuroendocrine tumor (5.1 cm) ,  intermediate grade (G2).  0/10 LN negative.  KI -67 in some areas of tumor up to 5% ( the synoptic states there were 10 mitoses /10 HPF, no LV invasion.  No IHC was performed .    Since the surgery he is no longer having the spells .  He no longer has to eat to maintain. He has lost 35-40#.  He is on a very low/ no carbohydrate diet.  He is also following with oncology, Dr Brandan Hanley, with whom he has upcoming appointment, and who has been ordering MRI.      We have the following imaging studies:   10/31/16: MRI abdomen pancreas ([facility] imaging): 3.9 cm mass pancreas with central necrosis. Mass encases 50% of adjacent SMV which remains patent. - Liver unremarkable.   11/11/16 CT abdomen: enhancing lesion with cystic component or central necrosis within head and uncinate process of pancreas concerning for malignancy.  1 cm indeterminate portacaval LN.   12/1/16 octreoscan: read as negative; I think the study shows very faint activity at the pancreatic mass site.  6/5/17 MRI abdomen: ? Liver mass  9/21/17  MRI abdomen: negative    REVIEW OF SYSTEMS  Further weight loss -   Can't eat that much , gets full quickly  No sweating  Sleep at night is OK  Cardiac: Negative  GI: negative  No spells  Brain working OK  No headaches.    Past Medical History  Past Medical History:   Diagnosis Date     Hypoglycemia     due to insulinoma     Migraine      Pancreatic neuroendocrine tumor- insulinoma 2016     Ruptured appendix 2003     Tinea versicolor      Uncomplicated asthma         Past Surgical History:   Procedure Laterality Date     APPENDECTOMY       ENDOSCOPIC ULTRASOUND UPPER GASTROINTESTINAL TRACT (GI) N/A 11/18/2016    Procedure: ENDOSCOPIC ULTRASOUND, ESOPHAGOSCOPY / UPPER GASTROINTESTINAL TRACT (GI);  Surgeon: Guru Malathi Canas MD;  Location: [facility] OR     PICC INSERTION       WHIPPLE PROCEDURE N/A 12/8/2016    Procedure: WHIPPLE PROCEDURE;  Surgeon: Matthias Armenta MD;  Location: [facility] OR        Medications  Current Outpatient Prescriptions   Medication     Cholecalciferol (VITAMIN D-3 PO)     vitamin B complex with vitamin C (VITAMIN  B COMPLEX) TABS tablet     Probiotic Product (PROBIOTIC ADVANCED) CAPS     ASPIRIN PO     Multiple Vitamin (MULTI-VITAMINS) TABS     amylase-lipase-protease (CREON 6) 6000 UNITS CPEP     Ascorbic Acid (VITAMIN C PO)     albuterol (PROAIR HFA, PROVENTIL HFA, VENTOLIN HFA) 108 (90 BASE) MCG/ACT inhaler     fluticasone (FLOVENT HFA) 110 MCG/ACT inhaler     Omega-3 Fatty Acids (OMEGA-3 FISH OIL PO)     No current facility-administered medications for this visit.        Current Outpatient Prescriptions   Medication Sig Dispense Refill     Cholecalciferol (VITAMIN D-3 PO) Take by mouth daily Pt unsure of dosage       vitamin B complex with vitamin C (VITAMIN  B COMPLEX) TABS tablet Take 1 tablet by mouth daily       Probiotic Product (PROBIOTIC ADVANCED) CAPS Take by mouth daily       ASPIRIN PO Take 81 mg by mouth       Multiple Vitamin (MULTI-VITAMINS) TABS        amylase-lipase-protease (CREON 6) 6000 UNITS CPEP Take 1-2 capsules (6,000-12,000 Units) by mouth 3 times daily (with meals) 450 capsule 3     Ascorbic Acid (VITAMIN C PO) Take by mouth 2 times daily        albuterol (PROAIR HFA, PROVENTIL HFA, VENTOLIN HFA) 108 (90 BASE) MCG/ACT inhaler Inhale 1-2 puffs into the lungs every 6 hours as needed for shortness of breath / dyspnea or wheezing       fluticasone (FLOVENT HFA) 110 MCG/ACT inhaler Inhale 2 puffs into the lungs At Bedtime        Omega-3 Fatty Acids (OMEGA-3 FISH OIL PO) Take 1 g by mouth 2 times daily (with meals) 3 capsules       Allergies  Allergies   Allergen Reactions     Pollen Extract      Other reaction(s): Runny Nose     Cat Hair Extract      Other reaction(s): Runny Nose     Family History  family history includes Thyroid Disease in his sister. There is no history of Pancreatic Cancer, Nephrolithiasis, Brain Tumor, or DIABETES.   No pancreatic  "tumors  Mom and dad alive and well    Social History  Social History   Substance Use Topics     Smoking status: Never Smoker     Smokeless tobacco: Not on file      Comment: 1/2 can daily     Alcohol use Yes      Comment: 6-7 beers every 2-3 weeks      .  Works nights 630 PM to 230 AM shift; gets up at 11 AM; .  Lifts weights, runs  Diet doesn't include carbohydrate -     Physical Exam  /78 (BP Location: Right arm, Patient Position: Sitting, Cuff Size: Adult Large)  Pulse 85  Ht 1.88 m (6' 2\")  Wt 104.5 kg (230 lb 4.8 oz)  BMI 29.57 kg/m2  Body mass index is 29.57 kg/(m^2).  GENERAL :  Large man In no apparent distress  SKIN: Normal color, normal temperature, texture.  No  purple striae.     EYES: PERRL, EOMI, No scleral icterus,  No proptosis, conjunctival redness, stare, retraction  MOUTH: Moist, pink; pharynx clear  NECK: No visible masses. No palpable adenopathy, or masses. No carotid bruits.   THYROID:  Not palpable  RESP: Lungs clear to auscultation bilaterally  CARDIAC: Regular rate and rhythm, normal S1 S2, without murmurs, rubs or gallops    ABDOMEN: Normal bowel sounds; soft, nontender, no HSM or masses       NEURO: awake, alert, responds appropriately to questions.  Cranial nerves intact.  Moves all extremities; Gait normal.  No tremor of the outstretched hand.  DTRs   1/4 ,   EXTREMITIES: No clubbing, cyanosis or edema.    DATA REVIEW    Results for YESENIA TORRES (MRN [id number]) as of 9/21/2017 07:28   Ref. Range 9/12/2017 15:39   Hemoglobin A1C Latest Ref Range: 4.3 - 6.0 % 5.1   C-Peptide Latest Ref Range: 0.9 - 6.9 ng/mL 0.5 (L)   Chromogranin A Latest Ref Range: 0 - 95 ng/mL 62   Proinsulin Latest Ref Range: <=8.0 pmol/L <1.6   Glucose Latest Ref Range: 70 - 99 mg/dL 88   WBC Latest Ref Range: 4.0 - 11.0 10e9/L 4.1   Hemoglobin Latest Ref Range: 13.3 - 17.7 g/dL 11.5 (L)   Hematocrit Latest Ref Range: 40.0 - 53.0 % 34.2 (L)   Platelet Count Latest Ref Range: 150 - 450 10e9/L 189 "   RBC Count Latest Ref Range: 4.4 - 5.9 10e12/L 3.57 (L)   MCV Latest Ref Range: 78 - 100 fl 96   MCH Latest Ref Range: 26.5 - 33.0 pg 32.2   MCHC Latest Ref Range: 31.5 - 36.5 g/dL 33.6   RDW Latest Ref Range: 10.0 - 15.0 % 13.3   Diff Method Unknown Automated Method   % Neutrophils Latest Units: % 52.1   % Lymphocytes Latest Units: % 35.5   % Monocytes Latest Units: % 6.8   % Eosinophils Latest Units: % 4.9   % Basophils Latest Units: % 0.7   % Immature Granulocytes Latest Units: % 0.0   Nucleated RBCs Latest Ref Range: 0 /100 0   Absolute Neutrophil Latest Ref Range: 1.6 - 8.3 10e9/L 2.1   Absolute Lymphocytes Latest Ref Range: 0.8 - 5.3 10e9/L 1.5   Absolute Monocytes Latest Ref Range: 0.0 - 1.3 10e9/L 0.3   Absolute Eosinophils Latest Ref Range: 0.0 - 0.7 10e9/L 0.2   Absolute Basophils Latest Ref Range: 0.0 - 0.2 10e9/L 0.0   Abs Immature Granulocytes Latest Ref Range: 0 - 0.4 10e9/L 0.0   Absolute Nucleated RBC Unknown 0.0     Results for YESENIA TORRES (MRN [id number]) as of 9/12/2017 10:05   Ref. Range 5/10/2017 18:39 5/10/2017 18:40 6/5/2017 10:51   Sodium Latest Ref Range: 133 - 144 mmol/L   137   Potassium Latest Ref Range: 3.4 - 5.3 mmol/L   4.1   Chloride Latest Ref Range: 94 - 109 mmol/L   102   Carbon Dioxide Latest Ref Range: 20 - 32 mmol/L   25   Urea Nitrogen Latest Ref Range: 7 - 30 mg/dL   30   Creatinine Latest Ref Range: 0.66 - 1.25 mg/dL   1.02   GFR Estimate Latest Ref Range: >60 mL/min/1.7m2   79   GFR Estimate If Black Latest Ref Range: >60 mL/min/1.7m2   >90...   Calcium Latest Ref Range: 8.5 - 10.1 mg/dL   9.0   Anion Gap Latest Ref Range: 3 - 14 mmol/L   9   Albumin Latest Ref Range: 3.4 - 5.0 g/dL   3.8   Protein Total Latest Ref Range: 6.8 - 8.8 g/dL   7.4   Bilirubin Total Latest Ref Range: 0.2 - 1.3 mg/dL   0.6   Alkaline Phosphatase Latest Ref Range: 40 - 150 U/L   41   ALT Latest Ref Range: 0 - 70 U/L   54   AST Latest Ref Range: 0 - 45 U/L   29   Hemoglobin A1C Latest Ref Range:  4.3 - 6.0 % 5.8     Adrenal Corticotropin Latest Ref Range: <47 pg/mL  28    C-Peptide Latest Ref Range: 0.9 - 6.9 ng/mL 1.1     Chromogranin A Unknown 55  53   Cortisol Serum Latest Ref Range: 4 - 22 ug/dL 12.4     Proinsulin Unknown 1.7     Glucose Latest Ref Range: 70 - 99 mg/dL 94  90   WBC Latest Ref Range: 4.0 - 11.0 10e9/L   3.3 (L)   Hemoglobin Latest Ref Range: 13.3 - 17.7 g/dL   13.2 (L)       MRI ABDOMEN     CLINICAL HISTORY:  Pancreatic head insulinoma. Whipple resection  December 8, 2016. No evidence for metastatic disease. Follow-up study.     TECHNIQUE:  Images were acquired with and without intravenous contrast  through the abdomen. The following MR images were acquired: TrueFISP,  multiplanar T2 weighted, axial T1 in/out of phase, axial fat-saturated  T1, diffusion-weighted. Multiplanar T1-weighted images with fat  saturation were before contrast administration and at multiple time  points following the administration of intravenous contrast. Contrast  dose: 10 ml Gadavist injected     FINDINGS:     Comparison study: 6/5/2017.     Liver: Homogeneous liver parenchyma. No significant hepatic steatosis.  No significant iron deposition. Previously described lesion in the  central liver near the IVC is not well characterized in the present  study. No intra or extrahepatic biliary dilatation.     Gallbladder: Surgically absent.     Spleen: The spleen is not enlarged. No focal lesions.     Kidneys: Kidneys are normal in size and position. No hydronephrosis.     Adrenal glands: Unremarkable.     Pancreas: Postsurgical changes of Whipple procedure. The remaining of  the pancreatic parenchyma is mildly atrophic with minimal decreased  precontrast T1 signal throughout. No focal lesions. Main pancreatic  duct with normal diameter.     Bowel: No dilated loops of bowel.     Lymph nodes: Prominent retroperitoneal and mesenteric lymph nodes, not  enlarged by size criteria, likely reactive.     Blood vessels:  Hepatic venous system and portal venous system are  patent. No abdominal aortic aneurysm.     Lung bases: No pleural effusion. No pericardial effusion. Cardiac size  within normal limits. Mild bilateral lower lobes dependent  atelectasis.     Bones and soft tissues: Degenerative changes of the spine. No  suspicious lesions in the bones.     Mesentery and abdominal wall: Postsurgical changes.     Ascites: No ascites.         IMPRESSION: In this patient with history of pancreatic head  insulinoma, post Whipple resection there is no evidence for metastatic  disease within the abdomen. Previously described lesion in the central  liver near the IVC is not well characterized in the present study.     I have personally reviewed the examination and initial interpretation  and I agree with the findings.     ALIRIO VANG MD    Again, thank you for allowing me to participate in the care of your patient.      Sincerely,    Eva Ortiz MD

## 2017-09-12 NOTE — NURSING NOTE
Chief Complaint   Patient presents with     RECHECK     INSULINOMA F/U     Blood Draw     labs drawn      There were no vitals taken for this visit.    Blood collected from left upper forearm peripheral IV before removal. Pt tolerated well. Pt checked in for next appointment.    Hallie Yoon RN

## 2017-09-13 LAB — C PEPTIDE SERPL-MCNC: 0.5 NG/ML (ref 0.9–6.9)

## 2017-09-14 LAB — PROINSULIN P 12H FAST SERPL-SCNC: <1.6 PMOL/L

## 2017-09-14 NOTE — PROGRESS NOTES
Oncology Follow-up Visit:  September 15, 2017      Diagnosis: [id number] Pancreatic head insulinoma.  No evidence of metastatic disease.     Treatment to date: Whipple resection December 8, 2016, by Dr. Matthias Armenta. Zero of 10 lymph nodes involved, grade 2, negative resection margins, Ki-67 equals 5%.  preoperative serum chromogranin A = 70 on 12/05/2016.     REFERRING PHYSICIAN:  Dr. Matthias Armenta, Hepatobiliary Surgery, [facility].      Oncology History:  Over a 2-3 year period, beginning in 2014 or so, he was having hypoglycemic episodes of unexplained origin.  He was even hospitalized for one such episode in the fall of 2016.  He met with a primary care physician, and subsequently an endocrinologist.  There was concern for hypoglycemic cause and concern for potential insulinoma.  On 10/31/2016, MRI at [facility] revealed a 3.9 x 3.7 x 3.6 cm mass in the head of the pancreas.  There was involvement of the superior mesenteric vein, but no evidence of metastatic disease noted.  He met with Dr. iMgel Sena at [facility], and a referral was made for surgical evaluation.      On 11/11/2016, a CT abdomen and pelvis was performed revealing an enhancing lesion with a cystic component versus central necrosis in the head and the uncinate process of the pancreas.  There was an indeterminate portacaval lymph node measuring 2.1 cm.  Otherwise, no evidence of suspicious adenopathy in the abdomen.  EUS was performed by Dr. Guru Canas at the [facility] on 11/18/2016.  He performed an FNA.  This revealed the tumor was composed of grade 1 neuroendocrine tumor with a low proliferation marker of Ki-67 less than 2%.  The tumor was positive for synaptophysin, chromogranin and CD56.      On 12/01/2016, the patient underwent a preoperative octreotide scan that did not show any evidence of metastatic disease or tumor.  There was no radiotracer uptake.  A baseline chromogranin A was 70.      His C  peptide at baseline was 0.7.  Insulin level at the preoperative baseline was 2.2 and 3.5.  Proinsulin levels were less than 7.5, and a subsequent level was 9.6.     On 12/05/2016, the patient met with Dr. Matthias Armenta, who took the patient to the operating room on 12/08/2016 to perform a Whipple.  The pancreaticoduodenectomy resulted in the revealing of a pancreatic neuroendocrine tumor at the head of the pancreas that measured 5.1 cm with intermediate grade G2.  Margins were negative by 0.1 cm.  Zero of 12 lymph nodes were involved.  The Ki-67 registered at 5% with 4 mitoses/10 high-power fields.  There was no perivascular, nor perineural invasion.  The pathologic stage was T2.  The pathologic N stage was N0, and M0 per the prior octreotide scan.  The patient is currently 9 weeks out from surgery.    2/6/17 - - medical oncology consultation, Dr. Hanley.  6/5/17 - - MRI abdomen: IMPRESSION:   1. Postsurgical of Whipple procedure evidence of locally recurrent  neuroendocrine tumor.  2. 1.2 cm ill-defined hepatic lesion in the central liver. This is  indeterminate and could represent sequela of recent episode of  inflammation or infection. However, it is new from prior and  metastases are not excluded. Consider short-term follow-up MRI.  3. Mildly increased size of pericaval lymph node and unchanged mildly  enlarged randy hepatis lymph node, indeterminate but favored to be  reactive.  6/9/17 - - oncology follow-up, Dr. Hanley.  9/12/17 - - endocrine consultation, Dr. Ortiz. Assessment of insulinoma: recommendation to follow Pro-insulin is a potential useful tumor marker for recurrence, to be followed by endocrinology, in context of glucose and C-peptide levels.  9/12/17 - - MRI abdomen: IMPRESSION: In this patient with history of pancreatic head  insulinoma, post Whipple resection there is no evidence for metastatic  disease within the abdomen. Previously described lesion in the central  liver near the IVC is not well  characterized in the present study.  9/15/17 - - oncology follow-up, Dr. Hanley.      Interim History/History Of Present Illness:   Mr. Joey Guerrero is a 42-year-old  gentleman from [place], [place]. He was  diagnosed with pancreatic head insulinoma.      Pt presents today for routine follow up and to discuss the results of his most recent MRI abdomen. He is feeling well. Energy is increasing. Appetite is robust. He denies any recent illnesses or f/c/s. His abdominal wound is healed well and he does not have any significant pain. Stools are well formed and regular. No significant further weight loss.     Review Of Systems:  Full 12-point ROS reviewed. Pertinent symptoms reviewed above per HPI.    Past medical, social, surgical, and family histories reviewed.     PAST MEDICAL HISTORY:  Pancreatic neuroendocrine tumor, pathologic T2 N0 M0 per above, status post Whipple surgery on 12/08/2016.  Other surgeries notable include an appendectomy in 2007.      FAMILY HISTORY:  Denies any family history of malignancy.      SOCIAL HISTORY:  The patient lives in [place], [place], with his wife.  They have 2 boys, ages 8 and 4.  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    Tobacco:  Used to chew and smoke occasional cigarettes since the age of 18 until recently.    Alcohol:  Denies alcohol abuse.    Drugs:  Denies illicit drug use.     Allergies:  Allergies as of 09/15/2017 - Thaddeus as Reviewed 09/12/2017   Allergen Reaction Noted     Pollen extract  12/05/2016     Cat hair extract  12/05/2016       Current Medications:  Current Outpatient Prescriptions   Medication Sig Dispense Refill     Cholecalciferol (VITAMIN D-3 PO) Take by mouth daily Pt unsure of dosage       vitamin B complex with vitamin C (VITAMIN  B COMPLEX) TABS tablet Take 1 tablet by mouth daily       Probiotic Product (PROBIOTIC ADVANCED) CAPS Take by mouth daily       ASPIRIN PO Take 81 mg by mouth       Multiple Vitamin (MULTI-VITAMINS)  TABS        amylase-lipase-protease (CREON 6) 6000 UNITS CPEP Take 1-2 capsules (6,000-12,000 Units) by mouth 3 times daily (with meals) 450 capsule 3     Ascorbic Acid (VITAMIN C PO) Take by mouth 2 times daily        albuterol (PROAIR HFA, PROVENTIL HFA, VENTOLIN HFA) 108 (90 BASE) MCG/ACT inhaler Inhale 1-2 puffs into the lungs every 6 hours as needed for shortness of breath / dyspnea or wheezing       fluticasone (FLOVENT HFA) 110 MCG/ACT inhaler Inhale 2 puffs into the lungs At Bedtime        Omega-3 Fatty Acids (OMEGA-3 FISH OIL PO) Take 1 g by mouth 2 times daily (with meals) 3 capsules          Physical Exam:  There were no vitals taken for this visit.  KPS:  90%.   Gen: alert, pleasant and conversational, NAD  HEENT: NC/AT, EOMI w/ PERRL, anicteric sclera.  OP clear. MMM. Neck supple no LAD  CV: normal S1,S2 with RRR no m/r/g  Resp: lungs CTA bilaterally with adequate air movement to bases. No wheezes or crackles  Abd: soft NTND no organomegaly or masses. Midline abdominal scar c/d/i and well healed. BS normoactive.   Ext: WWP no edema or cyanosis  Skin: no concerning lesions or rashes  Neuro: A&Ox4, no lateralizing sx. Grossly nonfocal.      Laboratory/Imaging Studies  !HEMATOLOGY Latest Ref Rng & Units 9/12/2017 6/5/2017   WBC 4.0 - 11.0 10e9/L 4.1 3.3 (L)   RBC 4.4 - 5.9 10e12/L 3.57 (L) 4.52   HGB 13.3 - 17.7 g/dL 11.5 (L) 13.2 (L)   HCT 40.0 - 53.0 % 34.2 (L) 41.5   MCV 78 - 100 fl 96 92   MCH 26.5 - 33.0 pg 32.2 29.2   MCHC 31.5 - 36.5 g/dL 33.6 31.8   RDW 10.0 - 15.0 % 13.3 15.0    - 450 10e9/L 189 232   % NEUTROPHILS % 52.1 42.6   % LYMPHOCYTES % 35.5 42.7   ABSOLUTE LYMPHOCYTES 0.8 - 5.3 10e9/L 1.5 1.4   ABSOLUTE MONOCYTES 0.0 - 1.3 10e9/L 0.3 0.3   % EOSINOPHILS % 4.9 4.0   ABSOLUTE EOSINOPHILS 0.0 - 0.7 10e9/L 0.2 0.1   ABSOULTE BASOPHILS 0.0 - 0.2 10e9/L 0.0 0.0   ABSOLUTE NEUTROPHIL 1.6 - 8.3 10e9/L 2.1 1.4 (L)   !COMPREHENSIVE Latest Ref Rng & Units 9/12/2017 6/5/2017   SODIUM 133 - 144  mmol/L  137   POTASSIUM 3.4 - 5.3 mmol/L  4.1   CHLORIDE 94 - 109 mmol/L  102   BUN 7 - 30 mg/dL  30   Creatinine 0.66 - 1.25 mg/dL     Creatinine 0.66 - 1.25 mg/dL  1.02   Glucose 70 - 99 mg/dL 88 90   ANION GAP 3 - 14 mmol/L  9   CALCIUM 8.5 - 10.1 mg/dL  9.0   ALBUMIN 3.4 - 5.0 g/dL  3.8   PROTEIN, TOTAL 6.8 - 8.8 g/dL  7.4   AST 0 - 45 U/L  29   ALT 0 - 70 U/L  54   ALKPHOS 40 - 150 U/L  41   BILIRUBIN TOTAL 0.2 - 1.3 mg/dL  0.6      Ref. Range 9/12/2017 15:39   Hemoglobin A1C Latest Ref Range: 4.3 - 6.0 % 5.1   C-Peptide Latest Ref Range: 0.9 - 6.9 ng/mL 0.5 (L)   Proinsulin Latest Ref Range: <=8.0 pmol/L <1.6       RADIOLOGY:   MRI abdomen 9/12/17  IMPRESSION: In this patient with history of pancreatic head  insulinoma, post Whipple resection there is no evidence for metastatic  disease within the abdomen. Previously described lesion in the central  liver near the IVC is not well characterized in the present study.      ASSESSMENT/PLAN:  42-year-old gentleman with pancreatic head insulinoma, who presented with hypoglycemic episodes for several years prior to diagnosis.  He had Whipple surgery by Dr. Matthias Armenta on 12/08/2016.     # Pancreatic head insulinoma - [id number], stage IB s/p resection and Whipple. Pt is doing very well post surgery. He had appointment with Dr. Ortiz on 9/12/17 to discuss endocrine surveillance. He showed no signs of hyperinsulinemia at that time. Labs ordered by her show low Proinsulin and Cpeptide as well as improved A1c (from 5.8 to 5.1) since June. MRI showing no clear foci of metastatic or recurrent disease. Liver lesion not well demonstrated but stable since last visit and unlikely to be related to malignancy.    Pt was reassured that he has overall low risk of recurrence and that surgery was most likely curative. We will continue with regular interval CT scans of the abdomen and pelvis as well as serial monitoring for signs of hypoglyemia that may represent tumor recurrence.      Pt seen and discussed with Dr. Dayan Simon MD   Hematology / Oncology Fellow  P: [phone]      MEDICAL ONCOLOGY ATTENDING PHYSICIAN ADDENDUM:  I have seen and evaluated this patient with the medical oncology fellow. I have reviewed and edited this fellow's note, and agree with the assessment and plan stated above.     We again generally reviewed the natural course, biology, diagnosis and treatment of pancreatic neuroendocrine tumors, and specifically focused on insulinomas.  We reviewed the difference between adenocarcinoma of the pancreas, which it is not, as compared to the 5-10% of pancreatic tumors that encompass neuroendocrine tumors in general.  He has a very specific subclass called insulinoma.      On physical exam, his surgical scar is well healed.  There is no overlying fluctuance, erythema or tenderness.  Of note, the patient has an extreme amount of anxiety about the chance of recurrence; thus, I spent the bulk of today's visit and discussion on reviewing the results of the MRI with the patient and his wife as far back as the surgical report, which I reviewed and printed out for him also, as he had a question about how much of his pancreas was remaining following the Whipple procedure.  Thus, I spent some time reviewing the extent of the pancreaticoduodenectomy; in other words, the procedure with extensive resection.  I reviewed again in full the pathology report showing lack of poor prognostic features.  Good prognostic features including the Ki-67 mitotic index less than 5%, lack of perineural or perivascular invasion, lack of lymph node involvement, and also negative surgical margins.  This was all stated to the patient and his wife to provide reassurance that the likelihood of recurrence is extremely small, but I did state very clearly that it is not zero.  I stated that as he had insulinoma with typical symptoms prior to diagnosis, he should alert us at any time and quickly if he  develops any such similar symptoms once again, as that would likely be the best marker of potential recurrence.  As the liver has the highest chance of being the anatomic site of recurrent disease, that presents the rationale for doing an MRI abdomen, as well as chromogranin A and proinsulin levels.  I will see him back in 3-4 months with the above, and advised him to call us sooner should he have any questions or concerns.         I spent 30 minutes in consultation, including H&P and Discussion. >50% of time was spent in counseling and in coordination of care.    Brandan Hanley MD, PhD             cc:   Matthias Armenta MD            Hepatobiliary Surgery          [facility]                     Guru Missael MD          [facility]

## 2017-09-15 ENCOUNTER — ONCOLOGY VISIT (OUTPATIENT)
Dept: ONCOLOGY | Facility: CLINIC | Age: 42
End: 2017-09-15
Attending: INTERNAL MEDICINE
Payer: COMMERCIAL

## 2017-09-15 VITALS
BODY MASS INDEX: 29.94 KG/M2 | WEIGHT: 233.3 LBS | TEMPERATURE: 97.4 F | RESPIRATION RATE: 16 BRPM | HEART RATE: 65 BPM | OXYGEN SATURATION: 99 % | HEIGHT: 74 IN | SYSTOLIC BLOOD PRESSURE: 120 MMHG | DIASTOLIC BLOOD PRESSURE: 84 MMHG

## 2017-09-15 DIAGNOSIS — D13.7 INSULINOMA: Primary | ICD-10-CM

## 2017-09-15 PROCEDURE — 99212 OFFICE O/P EST SF 10 MIN: CPT | Mod: ZF

## 2017-09-15 PROCEDURE — 99214 OFFICE O/P EST MOD 30 MIN: CPT | Mod: ZP | Performed by: INTERNAL MEDICINE

## 2017-09-15 ASSESSMENT — PAIN SCALES - GENERAL: PAINLEVEL: NO PAIN (0)

## 2017-09-15 NOTE — MR AVS SNAPSHOT
After Visit Summary   9/15/2017    Joey Guerrero    MRN: [id number]           Patient Information     Date Of Birth          1975        Visit Information        Provider Department      9/15/2017 11:30 AM Brandan Hanley MD [facility]        Today's Diagnoses     Insulinoma    -  1       Follow-ups after your visit        Follow-up notes from your care team     Return in about 3 months (around 12/15/2017).      Your next 10 appointments already scheduled     Jan 16, 2018 11:30 AM CST   (Arrive by 11:15 AM)   MR ABDOMEN W/O & W CONTRAST with [id number]   [facility] ([facility])    [address]  [facility] [address] [phone]           Take your medicines as usual, unless your doctor tells you not to. Bring a list of your current medicines to your exam (including vitamins, minerals and over-the-counter drugs). Also bring the results of similar scans you may have had.    The day before your exam, drink extra fluids at least six 8-ounce glasses (unless your doctor tells you to restrict your fluids).   Have a blood test (creatinine test) within 30 days of your exam. Go to your clinic or Diagnostic Imaging Department for this test.   Do not eat or drink for 6 hours prior to exam.  The MRI machine uses a strong magnet. Please wear clothes without metal (snaps, zippers). A sweatsuit works well, or we may give you a hospital gown.  Please remove any body piercings and hair extensions before you arrive. You will also remove watches, jewelry, hairpins, wallets, dentures, partial dental plates and hearing aids. You may wear contact lenses, and you may be able to wear your rings. We have a safe place to keep your personal items, but it is safer to leave them at home.   **IMPORTANT** THE INSTRUCTIONS BELOW ARE ONLY FOR THOSE PATIENTS WHO HAVE BEEN TOLD THEY WILL RECEIVE SEDATION OR GENERAL ANESTHESIA DURING THEIR MRI PROCEDURE:   IF YOU WILL RECEIVE SEDATION (take medicine to help you relax during your exam):   You must get the medicine from your doctor before you arrive. Bring the medicine to the exam. Do not take it at home.   Arrive one hour early. Bring someone who can take you home after the test. Your medicine will make you sleepy. After the exam, you may not drive, take a bus or take a taxi by yourself.   No eating 8 hours before your exam. You may have clear liquids up until 4 hours before your exam. (Clear liquids include water, clear tea, black coffee and fruit juice without pulp.)  IF YOU WILL RECEIVE ANESTHESIA (be asleep for your exam):   Arrive 1 1/2 hours early. Bring someone who can take you home after the test. You may not drive, take a bus or take a taxi by yourself.   No eating 8 hours before your exam. You may have clear liquids up until 4 hours before your exam. (Clear liquids include water, clear tea, black coffee and fruit juice without pulp.)  If you have any questions, please contact your Imaging Department exam site.            Jan 16, 2018 12:15 PM CST   LAB with  LAB   [facility] ([facility])    [address] [place] [address] [phone]           Patient must bring picture ID. Patient should be prepared to give a urine specimen  Please do not eat 10-12 hours before your appointment if you are coming in fasting for labs on lipids, cholesterol, or glucose (sugar). Pregnant women should follow their Care Team instructions. Water with medications is okay. Do not drink coffee or other fluids. If you have concerns about taking  your medications, please ask at office or if scheduling via [email or web address], send a message by clicking on Secure Messaging, Message Your Care Team.            Jan 19, 2018 11:30 AM CST   (Arrive by 11:15 AM)   Return Visit with Brandan Hanley MD   [facility] ([facility])    [address] [place] [address]  "Floor  [facility] [address] [phone]            Mar 12, 2018  3:40 PM CDT   (Arrive by 3:25 PM)   RETURN ENDOCRINE with Eva Ortiz MD   [facility] ([facility])    [address] [facility]  [address] [facility] [address] [phone]              Who to contact     If you have questions or need follow up information about today's clinic visit or your schedule please contact [facility] directly at [phone].  Normal or non-critical lab and imaging results will be communicated to you by 37[employer], letter or phone within 4 business days after the clinic has received the results. If you do not hear from us within 7 days, please contact the clinic through 37[employer] or phone. If you have a critical or abnormal lab result, we will notify you by phone as soon as possible.  Submit refill requests through [employer] or call your pharmacy and they will forward the refill request to us. Please allow 3 business days for your refill to be completed.          Additional Information About Your Visit        37coinshart Information     OurStory lets you send messages to your doctor, view your test results, renew your prescriptions, schedule appointments and more. To sign up, go to [email or web address] . Click on \"Log in\" on the left side of the screen, which will take you to the Welcome page. Then click on \"Sign up Now\" on the right side of the page.     You will be asked to enter the access code listed below, as well as some personal information. Please follow the directions to create your username and password.     Your access code is: [email or web address]  Expires: 2017  8:57 AM     Your access code will  in 90 days. If you need help or a new code, please call your [facility] clinic or [phone].        Care EveryWhere ID     This is your Care EveryWhere ID. This could be used by other organizations to access your [facility] medical records  [id number]   " "     Your Vitals Were     Pulse Temperature Respirations Height Pulse Oximetry BMI (Body Mass Index)    65 97.4  F (36.3  C) (Oral) 16 1.88 m (6' 2.02\") 99% 29.94 kg/m2       Blood Pressure from Last 3 Encounters:   09/15/17 120/84   09/12/17 113/78   06/09/17 126/55    Weight from Last 3 Encounters:   09/15/17 105.8 kg (233 lb 4.8 oz)   09/12/17 104.5 kg (230 lb 4.8 oz)   06/09/17 110.6 kg (243 lb 14.4 oz)               Primary Care Provider Office Phone # Fax #    Ken Piedra [phone]       [place] [address] [address] [facility] [address]        Equal Access to Services     DEE SHUKLA : Jerome Arrieta, waosman moran, gabriella huffman, margo donohue . So [facility] [phone].    ATENCIÓN: Si habla español, tiene a wong disposición servicios gratuitos de asistencia lingüística. [facility] [phone].    We comply with applicable federal civil rights laws and [place] laws. We do not discriminate on the basis of race, color, national origin, age, disability sex, sexual orientation or gender identity.            Thank you!     Thank you for choosing [facility]  for your care. Our goal is always to provide you with excellent care. Hearing back from our patients is one way we can continue to improve our services. Please take a few minutes to complete the written survey that you may receive in the mail after your visit with us. Thank you!             Your Updated Medication List - Protect others around you: Learn how to safely use, store and throw away your medicines at www.disposemymeds.org.          This list is accurate as of: 9/15/17 11:59 PM.  Always use your most recent med list.                   Brand Name Dispense Instructions for use Diagnosis    albuterol 108 (90 BASE) MCG/ACT Inhaler    PROAIR HFA/PROVENTIL HFA/VENTOLIN HFA     Inhale 1-2 puffs into the lungs every 6 hours as needed for shortness of breath / " dyspnea or wheezing        amylase-lipase-protease 6000 UNITS Cpep    CREON 6    450 capsule    Take 1-2 capsules (6,000-12,000 Units) by mouth 3 times daily (with meals)    Insulinoma       ASPIRIN PO      Take 81 mg by mouth        fluticasone 110 MCG/ACT Inhaler    FLOVENT HFA     Inhale 2 puffs into the lungs At Bedtime        MULTI-VITAMINS Tabs           OMEGA-3 FISH OIL PO      Take 1 g by mouth 2 times daily (with meals) 3 capsules        PROBIOTIC ADVANCED Caps      Take by mouth daily        vitamin B complex with vitamin C Tabs tablet      Take 1 tablet by mouth daily        VITAMIN C PO      Take by mouth 2 times daily        VITAMIN D-3 PO      Take by mouth daily Pt unsure of dosage

## 2017-09-15 NOTE — NURSING NOTE
"Oncology Rooming Note    September 15, 2017 11:32 AM   Joey Guerrero is a 42 year old male who presents for:    Chief Complaint   Patient presents with     Oncology Clinic Visit     Neuroendocrine Tumor - Labs and imaging      Initial Vitals: /84 (BP Location: Right arm, Patient Position: Sitting, Cuff Size: Adult Regular)  Pulse 65  Temp 97.4  F (36.3  C) (Oral)  Resp 16  Ht 1.88 m (6' 2.02\")  Wt 105.8 kg (233 lb 4.8 oz)  SpO2 99%  BMI 29.94 kg/m2 Estimated body mass index is 29.94 kg/(m^2) as calculated from the following:    Height as of this encounter: 1.88 m (6' 2.02\").    Weight as of this encounter: 105.8 kg (233 lb 4.8 oz). Body surface area is 2.35 meters squared.  No Pain (0) Comment: Data Unavailable   No LMP for male patient.  Allergies reviewed: Yes  Medications reviewed: Yes    Medications: Medication refills not needed today.  Pharmacy name entered into [facility]:    [facility] [address] [place], [place] - [address] [facility] [address] [place] [address] [id number] AT [facility] OF  & MAIN    Clinical concerns: Patient here to discuss labs and imaging. Wife in visit.     10 minutes for nursing intake (face to face time)     Fang Baires LPN            "

## 2017-09-15 NOTE — LETTER
9/15/2017       RE: Joey Guerrero  [address] [place] [address]     Dear Colleague,    Thank you for referring your patient, Joey Guerrero, to the [facility]. Please see a copy of my visit note below.    Oncology Follow-up Visit:  September 15, 2017      Diagnosis: [id number] Pancreatic head insulinoma.  No evidence of metastatic disease.     Treatment to date: Whipple resection December 8, 2016, by Dr. Matthias Armenta. Zero of 10 lymph nodes involved, grade 2, negative resection margins, Ki-67 equals 5%.  preoperative serum chromogranin A = 70 on 12/05/2016.     REFERRING PHYSICIAN:  Dr. Matthias Armenta, Hepatobiliary Surgery, [facility].      Oncology History:  Over a 2-3 year period, beginning in 2014 or so, he was having hypoglycemic episodes of unexplained origin.  He was even hospitalized for one such episode in the fall of 2016.  He met with a primary care physician, and subsequently an endocrinologist.  There was concern for hypoglycemic cause and concern for potential insulinoma.  On 10/31/2016, MRI at [facility] revealed a 3.9 x 3.7 x 3.6 cm mass in the head of the pancreas.  There was involvement of the superior mesenteric vein, but no evidence of metastatic disease noted.  He met with Dr. Migel Sena at [facility], and a referral was made for surgical evaluation.      On 11/11/2016, a CT abdomen and pelvis was performed revealing an enhancing lesion with a cystic component versus central necrosis in the head and the uncinate process of the pancreas.  There was an indeterminate portacaval lymph node measuring 2.1 cm.  Otherwise, no evidence of suspicious adenopathy in the abdomen.  EUS was performed by Dr. Guru Canas at the [facility] on 11/18/2016.  He performed an FNA.  This revealed the tumor was composed of grade 1 neuroendocrine tumor with a low proliferation marker of Ki-67 less than 2%.  The tumor was positive for synaptophysin,  chromogranin and CD56.      On 12/01/2016, the patient underwent a preoperative octreotide scan that did not show any evidence of metastatic disease or tumor.  There was no radiotracer uptake.  A baseline chromogranin A was 70.      His C peptide at baseline was 0.7.  Insulin level at the preoperative baseline was 2.2 and 3.5.  Proinsulin levels were less than 7.5, and a subsequent level was 9.6.     On 12/05/2016, the patient met with Dr. Matthias Armenta, who took the patient to the operating room on 12/08/2016 to perform a Whipple.  The pancreaticoduodenectomy resulted in the revealing of a pancreatic neuroendocrine tumor at the head of the pancreas that measured 5.1 cm with intermediate grade G2.  Margins were negative by 0.1 cm.  Zero of 12 lymph nodes were involved.  The Ki-67 registered at 5% with 4 mitoses/10 high-power fields.  There was no perivascular, nor perineural invasion.  The pathologic stage was T2.  The pathologic N stage was N0, and M0 per the prior octreotide scan.  The patient is currently 9 weeks out from surgery.    2/6/17 - - medical oncology consultation, Dr. Hanley.  6/5/17 - - MRI abdomen: IMPRESSION:   1. Postsurgical of Whipple procedure evidence of locally recurrent  neuroendocrine tumor.  2. 1.2 cm ill-defined hepatic lesion in the central liver. This is  indeterminate and could represent sequela of recent episode of  inflammation or infection. However, it is new from prior and  metastases are not excluded. Consider short-term follow-up MRI.  3. Mildly increased size of pericaval lymph node and unchanged mildly  enlarged randy hepatis lymph node, indeterminate but favored to be  reactive.  6/9/17 - - oncology follow-up, Dr. Hanley.  9/12/17 - - endocrine consultation, Dr. Ortiz. Assessment of insulinoma: recommendation to follow Pro-insulin is a potential useful tumor marker for recurrence, to be followed by endocrinology, in context of glucose and C-peptide levels.  9/12/17 - - MRI  abdomen: IMPRESSION: In this patient with history of pancreatic head  insulinoma, post Whipple resection there is no evidence for metastatic  disease within the abdomen. Previously described lesion in the central  liver near the IVC is not well characterized in the present study.  9/15/17 - - oncology follow-up, Dr. Hanley.      Interim History/History Of Present Illness:   Mr. Joey Guerrero is a 42-year-old  gentleman from [place], [place]. He was  diagnosed with pancreatic head insulinoma.      Pt presents today for routine follow up and to discuss the results of his most recent MRI abdomen. He is feeling well. Energy is increasing. Appetite is robust. He denies any recent illnesses or f/c/s. His abdominal wound is healed well and he does not have any significant pain. Stools are well formed and regular. No significant further weight loss.     Review Of Systems:  Full 12-point ROS reviewed. Pertinent symptoms reviewed above per HPI.    Past medical, social, surgical, and family histories reviewed.     PAST MEDICAL HISTORY:  Pancreatic neuroendocrine tumor, pathologic T2 N0 M0 per above, status post Whipple surgery on 12/08/2016.  Other surgeries notable include an appendectomy in 2007.      FAMILY HISTORY:  Denies any family history of malignancy.      SOCIAL HISTORY:  The patient lives in [place], [place], with his wife.  They have 2 boys, ages 8 and 4.  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    Tobacco:  Used to chew and smoke occasional cigarettes since the age of 18 until recently.    Alcohol:  Denies alcohol abuse.    Drugs:  Denies illicit drug use.     Allergies:  Allergies as of 09/15/2017 - Thaddeus as Reviewed 09/12/2017   Allergen Reaction Noted     Pollen extract  12/05/2016     Cat hair extract  12/05/2016       Current Medications:  Current Outpatient Prescriptions   Medication Sig Dispense Refill     Cholecalciferol (VITAMIN D-3 PO) Take by mouth daily Pt unsure of dosage        vitamin B complex with vitamin C (VITAMIN  B COMPLEX) TABS tablet Take 1 tablet by mouth daily       Probiotic Product (PROBIOTIC ADVANCED) CAPS Take by mouth daily       ASPIRIN PO Take 81 mg by mouth       Multiple Vitamin (MULTI-VITAMINS) TABS        amylase-lipase-protease (CREON 6) 6000 UNITS CPEP Take 1-2 capsules (6,000-12,000 Units) by mouth 3 times daily (with meals) 450 capsule 3     Ascorbic Acid (VITAMIN C PO) Take by mouth 2 times daily        albuterol (PROAIR HFA, PROVENTIL HFA, VENTOLIN HFA) 108 (90 BASE) MCG/ACT inhaler Inhale 1-2 puffs into the lungs every 6 hours as needed for shortness of breath / dyspnea or wheezing       fluticasone (FLOVENT HFA) 110 MCG/ACT inhaler Inhale 2 puffs into the lungs At Bedtime        Omega-3 Fatty Acids (OMEGA-3 FISH OIL PO) Take 1 g by mouth 2 times daily (with meals) 3 capsules          Physical Exam:  There were no vitals taken for this visit.  KPS:  90%.   Gen: alert, pleasant and conversational, NAD  HEENT: NC/AT, EOMI w/ PERRL, anicteric sclera.  OP clear. MMM. Neck supple no LAD  CV: normal S1,S2 with RRR no m/r/g  Resp: lungs CTA bilaterally with adequate air movement to bases. No wheezes or crackles  Abd: soft NTND no organomegaly or masses. Midline abdominal scar c/d/i and well healed. BS normoactive.   Ext: WWP no edema or cyanosis  Skin: no concerning lesions or rashes  Neuro: A&Ox4, no lateralizing sx. Grossly nonfocal.      Laboratory/Imaging Studies  !HEMATOLOGY Latest Ref Rng & Units 9/12/2017 6/5/2017   WBC 4.0 - 11.0 10e9/L 4.1 3.3 (L)   RBC 4.4 - 5.9 10e12/L 3.57 (L) 4.52   HGB 13.3 - 17.7 g/dL 11.5 (L) 13.2 (L)   HCT 40.0 - 53.0 % 34.2 (L) 41.5   MCV 78 - 100 fl 96 92   MCH 26.5 - 33.0 pg 32.2 29.2   MCHC 31.5 - 36.5 g/dL 33.6 31.8   RDW 10.0 - 15.0 % 13.3 15.0    - 450 10e9/L 189 232   % NEUTROPHILS % 52.1 42.6   % LYMPHOCYTES % 35.5 42.7   ABSOLUTE LYMPHOCYTES 0.8 - 5.3 10e9/L 1.5 1.4   ABSOLUTE MONOCYTES 0.0 - 1.3 10e9/L 0.3 0.3   %  EOSINOPHILS % 4.9 4.0   ABSOLUTE EOSINOPHILS 0.0 - 0.7 10e9/L 0.2 0.1   ABSOULTE BASOPHILS 0.0 - 0.2 10e9/L 0.0 0.0   ABSOLUTE NEUTROPHIL 1.6 - 8.3 10e9/L 2.1 1.4 (L)   !COMPREHENSIVE Latest Ref Rng & Units 9/12/2017 6/5/2017   SODIUM 133 - 144 mmol/L  137   POTASSIUM 3.4 - 5.3 mmol/L  4.1   CHLORIDE 94 - 109 mmol/L  102   BUN 7 - 30 mg/dL  30   Creatinine 0.66 - 1.25 mg/dL     Creatinine 0.66 - 1.25 mg/dL  1.02   Glucose 70 - 99 mg/dL 88 90   ANION GAP 3 - 14 mmol/L  9   CALCIUM 8.5 - 10.1 mg/dL  9.0   ALBUMIN 3.4 - 5.0 g/dL  3.8   PROTEIN, TOTAL 6.8 - 8.8 g/dL  7.4   AST 0 - 45 U/L  29   ALT 0 - 70 U/L  54   ALKPHOS 40 - 150 U/L  41   BILIRUBIN TOTAL 0.2 - 1.3 mg/dL  0.6      Ref. Range 9/12/2017 15:39   Hemoglobin A1C Latest Ref Range: 4.3 - 6.0 % 5.1   C-Peptide Latest Ref Range: 0.9 - 6.9 ng/mL 0.5 (L)   Proinsulin Latest Ref Range: <=8.0 pmol/L <1.6       RADIOLOGY:   MRI abdomen 9/12/17  IMPRESSION: In this patient with history of pancreatic head  insulinoma, post Whipple resection there is no evidence for metastatic  disease within the abdomen. Previously described lesion in the central  liver near the IVC is not well characterized in the present study.      ASSESSMENT/PLAN:  42-year-old gentleman with pancreatic head insulinoma, who presented with hypoglycemic episodes for several years prior to diagnosis.  He had Whipple surgery by Dr. Matthias Armenta on 12/08/2016.     # Pancreatic head insulinoma - [id number], stage IB s/p resection and Whipple. Pt is doing very well post surgery. He had appointment with Dr. Ortiz on 9/12/17 to discuss endocrine surveillance. He showed no signs of hyperinsulinemia at that time. Labs ordered by her show low Proinsulin and Cpeptide as well as improved A1c (from 5.8 to 5.1) since June. MRI showing no clear foci of metastatic or recurrent disease. Liver lesion not well demonstrated but stable since last visit and unlikely to be related to malignancy.    Pt was reassured that  he has overall low risk of recurrence and that surgery was most likely curative. We will continue with regular interval CT scans of the abdomen and pelvis as well as serial monitoring for signs of hypoglyemia that may represent tumor recurrence.     Pt seen and discussed with Dr. Dayan Simon MD   Hematology / Oncology Fellow  P: [phone]      MEDICAL ONCOLOGY ATTENDING PHYSICIAN ADDENDUM:  I have seen and evaluated this patient with the medical oncology fellow. I have reviewed and edited this fellow's note, and agree with the assessment and plan stated above.     We again generally reviewed the natural course, biology, diagnosis and treatment of pancreatic neuroendocrine tumors, and specifically focused on insulinomas.  We reviewed the difference between adenocarcinoma of the pancreas, which it is not, as compared to the 5-10% of pancreatic tumors that encompass neuroendocrine tumors in general.  He has a very specific subclass called insulinoma.      On physical exam, his surgical scar is well healed.  There is no overlying fluctuance, erythema or tenderness.  Of note, the patient has an extreme amount of anxiety about the chance of recurrence; thus, I spent the bulk of today's visit and discussion on reviewing the results of the MRI with the patient and his wife as far back as the surgical report, which I reviewed and printed out for him also, as he had a question about how much of his pancreas was remaining following the Whipple procedure.  Thus, I spent some time reviewing the extent of the pancreaticoduodenectomy; in other words, the procedure with extensive resection.  I reviewed again in full the pathology report showing lack of poor prognostic features.  Good prognostic features including the Ki-67 mitotic index less than 5%, lack of perineural or perivascular invasion, lack of lymph node involvement, and also negative surgical margins.  This was all stated to the patient and his wife to provide  reassurance that the likelihood of recurrence is extremely small, but I did state very clearly that it is not zero.  I stated that as he had insulinoma with typical symptoms prior to diagnosis, he should alert us at any time and quickly if he develops any such similar symptoms once again, as that would likely be the best marker of potential recurrence.  As the liver has the highest chance of being the anatomic site of recurrent disease, that presents the rationale for doing an MRI abdomen, as well as chromogranin A and proinsulin levels.  I will see him back in 3-4 months with the above, and advised him to call us sooner should he have any questions or concerns.         I spent 30 minutes in consultation, including H&P and Discussion. >50% of time was spent in counseling and in coordination of care.    Brandan Hanley MD, PhD           cc:   Matthias Armenta MD            Hepatobiliary Surgery          [facility]                     Guru Missael MD          [facility]

## 2017-09-17 LAB — CGA SERPL-MCNC: 62 NG/ML (ref 0–95)

## 2017-09-19 ENCOUNTER — TELEPHONE (OUTPATIENT)
Dept: ONCOLOGY | Facility: CLINIC | Age: 42
End: 2017-09-19

## 2017-09-19 NOTE — TELEPHONE ENCOUNTER
Oncology Nutrition:  Reason for Contact:  Patient was contacted by phone due to reporting concerns about ability to eat (7) on the Oncology Distress Screening tool.   Called Joey today, voicemail message was left indicating the reason for the call and the number to use to contact nutrition services/oncology dietitian.    Rena Reyes RD, LD  Oncology Dietitian  [phone]  Pager: [phone]

## 2017-09-19 NOTE — TELEPHONE ENCOUNTER
"  MEDICAL ONCOLOGY ATTENDING PHYSICIAN TELEPHONE NOTE - Tuesday September 19, 2017 - 12-12:15PM    I met with this patient in routine surveillance and follow-up of his resected insulinoma four days ago, Friday 9/15/17. He attended the visit with his wife; they both had many basic questions about his cancer, and I again reviewed his initial diagnostic course, pathology, the actual operative report, and subsequent radiologic follow-up to quell his anxiety. Please see the documented note for details.    Today he called in to the clinic's Triage hotline - the message I received was the following:    \"He is confused about the type of insulinoma he has. He said no one has ever really explained it so he can understand.  [phone].\"    I called the patient at the above date and time to address his remaining concerns. He stated he had performed internet searches since last Friday's visit, and has growing concern that his prognosis might be poor and that he will not live to raise his small children. He stated questions as to whether or not his cancer was malignant, had metastasized, and the purpose of surgery. I spent 15 minutes on the phone providing a general explanation in the same fashion as our prior encounters in clinic, reviewing basic information on diagnosis, treatment, and prognosis of neuroendocrine tumors before more specifically addressing functioning tumors and insulinomas, and his case most specifically. I reviewed the initial octreoscan report from 12/1/6, the pathology report from biopsy and surgical resection performed by Dr Armenta in December 2016, and the good prognostic features, focusing on the Ki-67<5%.  I tried to provide as much reassurance as I could regarding his generally excellent prognosis, and confirmed that insulinoma is considered a malignancy but affirming that surgery was performed with intent to cure. I stated that considering the relatively rarity of this form of cancer, it would be " very reasonable to seek 2nd or 3rd opinions at other centers with a stated focus on neuroendocrine tumors, at the very least to provide some reassurance regarding his good prognosis. I offered to help arrange referrals if needed, and to send records wherever he specifies if he chooses this route. He stated that he felt relieved by this explanation and phone call, and thanked me for the call.    Brandan Hanley MD PhD

## 2018-01-16 ENCOUNTER — RADIANT APPOINTMENT (OUTPATIENT)
Dept: MRI IMAGING | Facility: CLINIC | Age: 43
End: 2018-01-16
Attending: INTERNAL MEDICINE
Payer: COMMERCIAL

## 2018-01-16 DIAGNOSIS — D13.7 INSULINOMA: ICD-10-CM

## 2018-01-16 LAB — C PEPTIDE SERPL-MCNC: 1 NG/ML (ref 0.9–6.9)

## 2018-01-16 RX ORDER — GADOBUTROL 604.72 MG/ML
10 INJECTION INTRAVENOUS ONCE
Status: DISCONTINUED | OUTPATIENT
Start: 2018-01-16 | End: 2018-01-16 | Stop reason: CLARIF

## 2018-01-16 RX ORDER — GADOBUTROL 604.72 MG/ML
10 INJECTION INTRAVENOUS ONCE
Status: COMPLETED | OUTPATIENT
Start: 2018-01-16 | End: 2018-01-16

## 2018-01-16 RX ADMIN — GADOBUTROL 10 ML: 604.72 INJECTION INTRAVENOUS at 12:18

## 2018-01-16 NOTE — DISCHARGE INSTRUCTIONS
MRI Contrast Discharge Instructions    The IV contrast you received today will pass out of your body in your  urine. This will happen in the next 24 hours. You will not feel this process.  Your urine will not change color.    Drink at least 4 extra glasses of water or juice today (unless your doctor  has restricted your fluids). This reduces the stress on your kidneys.  You may take your regular medicines.    If you are on dialysis: It is best to have dialysis today.    If you have a reaction: Most reactions happen right away. If you have  any new symptoms after leaving the hospital (such as hives or swelling),  call your hospital at the correct number below. Or call your family doctor.  If you have breathing distress or wheezing, call 911.    Special instructions: ***    I have read and understand the above information.    Signature:______________________________________ Date:___________    Staff:__________________________________________ Date:___________     Time:__________    [facility] Departments:    ___[place]: [phone]  ___[facility]: [phone]  ___[place]: [phone] ___[facility]: [phone]  ___[facility]: [phone]  ___[facility]: [phone]  ___[phone]  ___[facility]: [phone]  ___[phone]

## 2018-01-18 NOTE — PROGRESS NOTES
Oncology Follow-up Visit:  January 19, 2018        Diagnosis: iH1V2O2 Pancreatic head insulinoma.  No evidence of metastatic disease.     Treatment to date: Whipple resection December 8, 2016, by Dr. Matthias Armenta. Zero of 10 lymph nodes involved, grade 2, negative resection margins, Ki-67 equals 5%.  preoperative serum chromogranin A = 70 on 12/05/2016.     REFERRING PHYSICIAN:  Dr. Matthias Armenta, Hepatobiliary Surgery, [facility].      Oncology History:  Over a 2-3 year period, beginning in 2014 or so, he was having hypoglycemic episodes of unexplained origin.  He was even hospitalized for one such episode in the fall of 2016.  He met with a primary care physician, and subsequently an endocrinologist.  There was concern for hypoglycemic cause and concern for potential insulinoma.  On 10/31/2016, MRI at [facility] revealed a 3.9 x 3.7 x 3.6 cm mass in the head of the pancreas.  There was involvement of the superior mesenteric vein, but no evidence of metastatic disease noted.  He met with Dr. Migel Sena at [facility], and a referral was made for surgical evaluation.      On 11/11/2016, a CT abdomen and pelvis was performed revealing an enhancing lesion with a cystic component versus central necrosis in the head and the uncinate process of the pancreas.  There was an indeterminate portacaval lymph node measuring 2.1 cm.  Otherwise, no evidence of suspicious adenopathy in the abdomen.  EUS was performed by Dr. Guru Canas at the [facility] on 11/18/2016.  He performed an FNA.  This revealed the tumor was composed of grade 1 neuroendocrine tumor with a low proliferation marker of Ki-67 less than 2%.  The tumor was positive for synaptophysin, chromogranin and CD56.      On 12/01/2016, the patient underwent a preoperative octreotide scan that did not show any evidence of metastatic disease or tumor.  There was no radiotracer uptake.  A baseline chromogranin A was 70.      His C  peptide at baseline was 0.7.  Insulin level at the preoperative baseline was 2.2 and 3.5.  Proinsulin levels were less than 7.5, and a subsequent level was 9.6.     On 12/05/2016, the patient met with Dr. Matthias Armenta, who took the patient to the operating room on 12/08/2016 to perform a Whipple.  The pancreaticoduodenectomy resulted in the revealing of a pancreatic neuroendocrine tumor at the head of the pancreas that measured 5.1 cm with intermediate grade G2.  Margins were negative by 0.1 cm.  Zero of 12 lymph nodes were involved.  The Ki-67 registered at 5% with 4 mitoses/10 high-power fields.  There was no perivascular, nor perineural invasion.  The pathologic stage was T2.  The pathologic N stage was N0, and M0 per the prior octreotide scan.  The patient is currently 9 weeks out from surgery.    2/6/17 - - medical oncology consultation, Dr. Hanley.  6/5/17 - - MRI abdomen: IMPRESSION:   1. Postsurgical of Whipple procedure evidence of locally recurrent  neuroendocrine tumor.  2. 1.2 cm ill-defined hepatic lesion in the central liver. This is  indeterminate and could represent sequela of recent episode of  inflammation or infection. However, it is new from prior and  metastases are not excluded. Consider short-term follow-up MRI.  3. Mildly increased size of pericaval lymph node and unchanged mildly  enlarged randy hepatis lymph node, indeterminate but favored to be  reactive.  6/9/17 - - oncology follow-up, Dr. Hanley.  9/12/17 - - endocrine consultation, Dr. Ortiz. Assessment of insulinoma: recommendation to follow Pro-insulin is a potential useful tumor marker for recurrence, to be followed by endocrinology, in context of glucose and C-peptide levels.  9/12/17 - - MRI abdomen: IMPRESSION: In this patient with history of pancreatic head  insulinoma, post Whipple resection there is no evidence for metastatic  disease within the abdomen. Previously described lesion in the central  liver near the IVC is not well  characterized in the present study.  9/15/17 - - oncology follow-up, Dr. Hanley.  1/16/18 - - MRI abdomen: IMPRESSION:   In this patient with history of insulinoma in the pancreatic head  status post Whipple procedure:  1. No evidence of metastatic disease within the abdomen.  2. Mild pancreatic duct ectasia measuring up to 6 mm. This is  unchanged to minimally increased when compared to the prior exam and  of uncertain clinical significance. No significant associated T1  abnormality to suggest pancreatitis.  1/19/18 - - oncology follow-up, Dr. Hanley.      Interim History/History Of Present Illness:   Mr. Joey Guerrero is a 42-year-old  gentleman from [place], [place] with pancreatic head insulinoma resected by Dr. Armenta December 8, 2016.    Joey presents for routine followup with his wife today.  As noted in the telephone message from 3 months ago several days after my last visit with him in which there was no evidence of progressive or recurrent disease, he searched the Internet and panicked.  He was concerned about some of the information he read on the Internet regarding pancreatic adenocarcinoma.  He also was questioning based on input from family members and friends the issue of whether or not he truly had a malignant cancer versus non.  We had an extensive conversation that was documented in the telephone note and which he says today provided some reassurance.  He is 13 months out from the Whipple resection performed by Dr. Matthias Armenta.  The insulinoma had negative resection margins and a Ki-67 of 5%.  He is generally doing well.  He denies any concerning symptoms that would be allied with insulinoma such as dizziness or any symptoms that would be consistent with hypoglycemia.  He also denies any potential carcinoid-like symptoms.  He denies any pain or any other issues at this time.  His most recent surveillance scan was an MRI of the abdomen done on 01/16 that showed no evidence of recurrent malignancy.                    Review Of Systems:  Full 12-point ROS reviewed. Pertinent symptoms reviewed above per HPI.    Past medical, social, surgical, and family histories reviewed.     PAST MEDICAL HISTORY:  Pancreatic neuroendocrine tumor, pathologic T2 N0 M0 per above, status post Whipple surgery on 12/08/2016.  Other surgeries notable include an appendectomy in 2007.      FAMILY HISTORY:  Denies any family history of malignancy.      SOCIAL HISTORY:  The patient lives in [place], [place], with his wife.  They have 2 boys, ages 8 and 4.  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    Tobacco:  Used to chew and smoke occasional cigarettes since the age of 18 until recently.    Alcohol:  Denies alcohol abuse.    Drugs:  Denies illicit drug use.     Allergies:  Allergies as of 01/19/2018 - Thaddeus as Reviewed 01/16/2018   Allergen Reaction Noted     Pollen extract  12/05/2016     Cat hair extract  12/05/2016       Current Medications:  Current Outpatient Prescriptions   Medication Sig Dispense Refill     Cholecalciferol (VITAMIN D-3 PO) Take by mouth daily Pt unsure of dosage       vitamin B complex with vitamin C (VITAMIN  B COMPLEX) TABS tablet Take 1 tablet by mouth daily       Probiotic Product (PROBIOTIC ADVANCED) CAPS Take by mouth daily       ASPIRIN PO Take 81 mg by mouth       Multiple Vitamin (MULTI-VITAMINS) TABS        amylase-lipase-protease (CREON 6) 6000 UNITS CPEP Take 1-2 capsules (6,000-12,000 Units) by mouth 3 times daily (with meals) 450 capsule 3     Ascorbic Acid (VITAMIN C PO) Take by mouth 2 times daily        albuterol (PROAIR HFA, PROVENTIL HFA, VENTOLIN HFA) 108 (90 BASE) MCG/ACT inhaler Inhale 1-2 puffs into the lungs every 6 hours as needed for shortness of breath / dyspnea or wheezing       fluticasone (FLOVENT HFA) 110 MCG/ACT inhaler Inhale 2 puffs into the lungs At Bedtime        Omega-3 Fatty Acids (OMEGA-3 FISH OIL PO) Take 1 g by mouth 2 times daily (with meals) 3 capsules           Physical Exam:  /83  Pulse 55  Temp 97.3  F (36.3  C) (Oral)  Resp 16  Wt 112.1 kg (247 lb 2.2 oz)  SpO2 98%  BMI 31.72 kg/m2      PHYSICAL EXAMINATION:     GENERAL:  Very pleasant, robust, young adult,  gentleman in no acute distress, alert and oriented x3, very well fit as he exercises a lot.   HEENT:  Anicteric sclerae.  Oropharynx is clear without mucositis or thrush.   CARDIOVASCULAR:  Regular rate and rhythm.  Normal S1, S2.  No murmurs, gallops or rubs.   LUNGS:  Clear to auscultation bilaterally.   ABDOMEN:  Benign.  A midline surgical scar has well-healed.  There is no overlying fluctuance, erythema or tenderness.   EXTREMITIES:  No edema noted.         Laboratory/Imaging Studies.  Results for orders placed or performed in visit on 01/16/18   C-peptide   Result Value Ref Range    C Peptide 1.0 0.9 - 6.9 ng/mL         RADIOLOGY:   I personally reviewed the MRI images from the abdomen, an MRI done on 01/16/2018.  I printed out a copy of the report and reviewed it in full with the patient and provided him and his wife with this copy.         ASSESSMENT/PLAN:  42-year-old gentleman with pancreatic head insulinoma, who presented with hypoglycemic episodes for several years prior to diagnosis.  He had Whipple surgery by Dr. Matthias Armenta on 12/08/2016.     We again generally reviewed the natural course, biology, diagnosis and treatment of pancreatic neuroendocrine tumors, and specifically focused on insulinomas.  We reviewed the difference between adenocarcinoma of the pancreas, which it is not, as compared to the 5-10% of pancreatic tumors that encompass neuroendocrine tumors in general.  He has a very specific subclass called insulinoma.      The majority of today's discussion was geared toward providing reassurance to address his fears of potential cancer recurrence. I reviewed once again the fact that his tumor showed lack of poor prognostic features.  Good prognostic  willfeatures including the Ki-67 mitotic index less than 5%, lack of perineural or perivascular invasion, lack of lymph node involvement, and also negative surgical margins.  This was all stated to the patient and his wife to provide reassurance that the likelihood of recurrence is extremely small, but I did state very clearly that it is not zero.  I stated that as he had insulinoma with typical symptoms prior to diagnosis, he should alert us at any time and quickly if he develops any such similar symptoms once again, as that would likely be the best marker of potential recurrence.  As the liver has the highest chance of being the anatomic site of recurrent disease, that presents the rationale for doing an MRI abdomen, as well as chromogranin A and proinsulin levels.     As he is now 13 months out from surgery, I would be comfortable with a followup MRI of the abdomen and check of chromogranin levels in 3-4 months.  I would feel comfortable in the 4-month range at this point, but if he prefers earlier, then 3 months would also be appropriate.  He also has a mid-March followup scheduled with Dr. Ortiz in Endocrinology.  We will go ahead and schedule the above.  He will notify us in the interim should any questions develop before the next visit.  In addition, he should follow up with his primary care provider regarding primary care appropriate issues.           I spent 30 minutes in consultation, including H&P and Discussion. >50% of time was spent in counseling and in coordination of care.    Brandan Hanley MD, PhD             cc:   Matthias Armenta MD            Hepatobiliary Surgery          [facility]                     Guru Missael MD          [facility]

## 2018-01-19 ENCOUNTER — ONCOLOGY VISIT (OUTPATIENT)
Dept: ONCOLOGY | Facility: CLINIC | Age: 43
End: 2018-01-19
Attending: INTERNAL MEDICINE
Payer: COMMERCIAL

## 2018-01-19 VITALS
SYSTOLIC BLOOD PRESSURE: 126 MMHG | HEART RATE: 55 BPM | TEMPERATURE: 97.3 F | WEIGHT: 247.14 LBS | BODY MASS INDEX: 31.72 KG/M2 | DIASTOLIC BLOOD PRESSURE: 83 MMHG | OXYGEN SATURATION: 98 % | RESPIRATION RATE: 16 BRPM

## 2018-01-19 DIAGNOSIS — C25.4 INSULINOMA, MALIGNANT (H): Primary | ICD-10-CM

## 2018-01-19 LAB — CGA SERPL-MCNC: 77 NG/ML (ref 0–95)

## 2018-01-19 PROCEDURE — G0463 HOSPITAL OUTPT CLINIC VISIT: HCPCS | Mod: ZF

## 2018-01-19 PROCEDURE — 99214 OFFICE O/P EST MOD 30 MIN: CPT | Mod: ZP | Performed by: INTERNAL MEDICINE

## 2018-01-19 ASSESSMENT — PAIN SCALES - GENERAL: PAINLEVEL: NO PAIN (0)

## 2018-01-19 NOTE — NURSING NOTE
"Oncology Rooming Note    January 19, 2018 11:29 AM   Joey Guerrero is a 42 year old male who presents for:    Chief Complaint   Patient presents with     Oncology Clinic Visit     Return for Neuendocrine Tumor, MRI, lab results      Initial Vitals: /83  Pulse 55  Temp 97.3  F (36.3  C) (Oral)  Resp 16  Wt 112.1 kg (247 lb 2.2 oz)  SpO2 98%  BMI 31.72 kg/m2 Estimated body mass index is 31.72 kg/(m^2) as calculated from the following:    Height as of 9/15/17: 1.88 m (6' 2.02\").    Weight as of this encounter: 112.1 kg (247 lb 2.2 oz). Body surface area is 2.42 meters squared.  No Pain (0) Comment: Data Unavailable   No LMP for male patient.  Allergies reviewed: Yes  Medications reviewed: Yes    Medications: Medication refills not needed today.  Pharmacy name entered into [facility]:    [facility] [address] [place], [place] - [address] [facility] [address] [place] [id number] AT [facility] OF  & MAIN    Clinical concerns: results  Dayan was notified.    7 minutes for nursing intake (face to face time)     Suze Mcgill MA              "

## 2018-01-19 NOTE — MR AVS SNAPSHOT
After Visit Summary   1/19/2018    Joey Guerrero    MRN: [id number]           Patient Information     Date Of Birth          1975        Visit Information        Provider Department      1/19/2018 11:30 AM Brandan Hanley MD [facility]        Today's Diagnoses     Insulinoma, malignant (H)    -  1       Follow-ups after your visit        Follow-up notes from your care team     Return in about 3 months (around 4/19/2018).      Your next 10 appointments already scheduled     Mar 12, 2018  3:40 PM CDT   (Arrive by 3:25 PM)   RETURN ENDOCRINE with Eva Ortiz MD   [facility] ([facility]    [address] [place] [address] [id number] [phone]            May 10, 2018 10:15 AM CDT   LAB with  LAB   [facility] ([facility])    [address] [place] [id number] [phone]           Please do not eat 10-12 hours before your appointment if you are coming in fasting for labs on lipids, cholesterol, or glucose (sugar). This does not apply to pregnant women. Water, hot tea and black coffee (with nothing added) are okay. Do not drink other fluids, diet soda or chew gum.            May 10, 2018 10:45 AM CDT   (Arrive by 10:30 AM)   MR ABDOMEN W/O & W CONTRAST with [facility] ([facility])    [address] [place] [address] [id number] [phone]           Take your medicines as usual, unless your doctor tells you not to. Bring a list of your current medicines to your exam (including vitamins, minerals and over-the-counter drugs). Also bring the results of similar scans you may have had.    The day before your exam, drink extra fluids at least six 8-ounce glasses (unless your doctor tells you to restrict your fluids).   Have a blood test (creatinine test) within 30 days of your exam. Go to your clinic or  Diagnostic Imaging Department for this test.   Do not eat or drink for 6 hours prior to exam.  The MRI machine uses a strong magnet. Please wear clothes without metal (snaps, zippers). A sweatsuit works well, or we may give you a hospital gown.  Please remove any body piercings and hair extensions before you arrive. You will also remove watches, jewelry, hairpins, wallets, dentures, partial dental plates and hearing aids. You may wear contact lenses, and you may be able to wear your rings. We have a safe place to keep your personal items, but it is safer to leave them at home.   **IMPORTANT** THE INSTRUCTIONS BELOW ARE ONLY FOR THOSE PATIENTS WHO HAVE BEEN TOLD THEY WILL RECEIVE SEDATION OR GENERAL ANESTHESIA DURING THEIR MRI PROCEDURE:  IF YOU WILL RECEIVE SEDATION (take medicine to help you relax during your exam):   You must get the medicine from your doctor before you arrive. Bring the medicine to the exam. Do not take it at home.   Arrive one hour early. Bring someone who can take you home after the test. Your medicine will make you sleepy. After the exam, you may not drive, take a bus or take a taxi by yourself.   No eating 8 hours before your exam. You may have clear liquids up until 4 hours before your exam. (Clear liquids include water, clear tea, black coffee and fruit juice without pulp.)  IF YOU WILL RECEIVE ANESTHESIA (be asleep for your exam):   Arrive 1 1/2 hours early. Bring someone who can take you home after the test. You may not drive, take a bus or take a taxi by yourself.   No eating 8 hours before your exam. You may have clear liquids up until 4 hours before your exam. (Clear liquids include water, clear tea, black coffee and fruit juice without pulp.)  If you have any questions, please contact your Imaging Department exam site.            May 14, 2018  9:15 AM CDT   (Arrive by 9:00 AM)   Return Visit with Brandan Hanley MD   [facility]     "[address] [id number] [phone]              Future tests that were ordered for you today     Open Future Orders        Priority Expected Expires Ordered    MR Abdomen w/o & w Contrast Routine 2018    Chromogranin A Routine 2018    CBC with platelets differential Routine 2018    Comprehensive metabolic panel Routine 2018            Who to contact     If you have questions or need follow up information about today's clinic visit or your schedule please contact [facility] directly at [phone].  Normal or non-critical lab and imaging results will be communicated to you by MyChart, letter or phone within 4 business days after the clinic has received the results. If you do not hear from us within 7 days, please contact the clinic through [employer] or phone. If you have a critical or abnormal lab result, we will notify you by phone as soon as possible.  Submit refill requests through [employer] or call your pharmacy and they will forward the refill request to us. Please allow 3 business days for your refill to be completed.          Additional Information About Your Visit        [employer] Information     [employer] lets you send messages to your doctor, view your test results, renew your prescriptions, schedule appointments and more. To sign up, go to [email or web address] . Click on \"Log in\" on the left side of the screen, which will take you to the Welcome page. Then click on \"Sign up Now\" on the right side of the page.     You will be asked to enter the access code listed below, as well as some personal information. Please follow the directions to create your username and password.     Your access code is: [email or web address]  Expires: 2018  6:30 AM     Your access code will  in 90 days. If you need help or a new code, please call your [place] clinic or " [phone].        Care EveryWhere ID     This is your Care EveryWhere ID. This could be used by other organizations to access your [facility] medical records  [id number]        Your Vitals Were     Pulse Temperature Respirations Pulse Oximetry BMI (Body Mass Index)       55 97.3  F (36.3  C) (Oral) 16 98% 31.72 kg/m2        Blood Pressure from Last 3 Encounters:   01/19/18 126/83   09/15/17 120/84   09/12/17 113/78    Weight from Last 3 Encounters:   01/19/18 112.1 kg (247 lb 2.2 oz)   09/15/17 105.8 kg (233 lb 4.8 oz)   09/12/17 104.5 kg (230 lb 4.8 oz)               Primary Care Provider Office Phone # Fax #    Ken Piedra [phone]       [place] [address] [address] [facility] [address]        Equal Access to Services     DEE SHUKLA : Hadii isi alonzo hadasho Soomaali, waaxda luqadaha, qaybta kaalmada adeegyada, margo donohue . So [facility] [phone].    ATENCIÓN: Si reg galicia, tiene a wong disposición servicios gratuitos de asistencia lingüística. Llame al [phone].    We comply with applicable federal civil rights laws and [place] laws. We do not discriminate on the basis of race, color, national origin, age, disability, sex, sexual orientation, or gender identity.            Thank you!     Thank you for choosing [facility]  for your care. Our goal is always to provide you with excellent care. Hearing back from our patients is one way we can continue to improve our services. Please take a few minutes to complete the written survey that you may receive in the mail after your visit with us. Thank you!             Your Updated Medication List - Protect others around you: Learn how to safely use, store and throw away your medicines at www.disposemymeds.org.          This list is accurate as of: 1/19/18 11:59 AM.  Always use your most recent med list.                   Brand Name Dispense Instructions for use Diagnosis    albuterol 108  (90 BASE) MCG/ACT Inhaler    PROAIR HFA/PROVENTIL HFA/VENTOLIN HFA     Inhale 1-2 puffs into the lungs every 6 hours as needed for shortness of breath / dyspnea or wheezing        amylase-lipase-protease 6000 UNITS Cpep    CREON 6    450 capsule    Take 1-2 capsules (6,000-12,000 Units) by mouth 3 times daily (with meals)    Insulinoma       ASPIRIN PO      Take 81 mg by mouth        fluticasone 110 MCG/ACT Inhaler    FLOVENT HFA     Inhale 2 puffs into the lungs At Bedtime        MULTI-VITAMINS Tabs           OMEGA-3 FISH OIL PO      Take 1 g by mouth 2 times daily (with meals) 3 capsules        PROBIOTIC ADVANCED Caps      Take by mouth daily        vitamin B complex with vitamin C Tabs tablet      Take 1 tablet by mouth daily        VITAMIN C PO      Take by mouth 2 times daily        VITAMIN D-3 PO      Take by mouth daily Pt unsure of dosage

## 2018-01-19 NOTE — LETTER
1/19/2018       RE: Joey Guerrero  [address] [place] [address]     Dear Colleague,    Thank you for referring your patient, Joey Guerrero, to the [facility]. Please see a copy of my visit note below.    Oncology Follow-up Visit:  January 19, 2018        Diagnosis: [id number] Pancreatic head insulinoma.  No evidence of metastatic disease.     Treatment to date: Whipple resection December 8, 2016, by Dr. Matthias Armenta. Zero of 10 lymph nodes involved, grade 2, negative resection margins, Ki-67 equals 5%.  preoperative serum chromogranin A = 70 on 12/05/2016.     REFERRING PHYSICIAN:  Dr. Matthias Armenta, Hepatobiliary Surgery, [facility].      Oncology History:  Over a 2-3 year period, beginning in 2014 or so, he was having hypoglycemic episodes of unexplained origin.  He was even hospitalized for one such episode in the fall of 2016.  He met with a primary care physician, and subsequently an endocrinologist.  There was concern for hypoglycemic cause and concern for potential insulinoma.  On 10/31/2016, MRI at [facility] revealed a 3.9 x 3.7 x 3.6 cm mass in the head of the pancreas.  There was involvement of the superior mesenteric vein, but no evidence of metastatic disease noted.  He met with Dr. Migel Sena at [facility], and a referral was made for surgical evaluation.      On 11/11/2016, a CT abdomen and pelvis was performed revealing an enhancing lesion with a cystic component versus central necrosis in the head and the uncinate process of the pancreas.  There was an indeterminate portacaval lymph node measuring 2.1 cm.  Otherwise, no evidence of suspicious adenopathy in the abdomen.  EUS was performed by Dr. Guru Canas at the [facility] on 11/18/2016.  He performed an FNA.  This revealed the tumor was composed of grade 1 neuroendocrine tumor with a low proliferation marker of Ki-67 less than 2%.  The tumor was positive for synaptophysin,  chromogranin and CD56.      On 12/01/2016, the patient underwent a preoperative octreotide scan that did not show any evidence of metastatic disease or tumor.  There was no radiotracer uptake.  A baseline chromogranin A was 70.      His C peptide at baseline was 0.7.  Insulin level at the preoperative baseline was 2.2 and 3.5.  Proinsulin levels were less than 7.5, and a subsequent level was 9.6.     On 12/05/2016, the patient met with Dr. Matthias Armenta, who took the patient to the operating room on 12/08/2016 to perform a Whipple.  The pancreaticoduodenectomy resulted in the revealing of a pancreatic neuroendocrine tumor at the head of the pancreas that measured 5.1 cm with intermediate grade G2.  Margins were negative by 0.1 cm.  Zero of 12 lymph nodes were involved.  The Ki-67 registered at 5% with 4 mitoses/10 high-power fields.  There was no perivascular, nor perineural invasion.  The pathologic stage was T2.  The pathologic N stage was N0, and M0 per the prior octreotide scan.  The patient is currently 9 weeks out from surgery.    2/6/17 - - medical oncology consultation, Dr. Hanley.  6/5/17 - - MRI abdomen: IMPRESSION:   1. Postsurgical of Whipple procedure evidence of locally recurrent  neuroendocrine tumor.  2. 1.2 cm ill-defined hepatic lesion in the central liver. This is  indeterminate and could represent sequela of recent episode of  inflammation or infection. However, it is new from prior and  metastases are not excluded. Consider short-term follow-up MRI.  3. Mildly increased size of pericaval lymph node and unchanged mildly  enlarged randy hepatis lymph node, indeterminate but favored to be  reactive.  6/9/17 - - oncology follow-up, Dr. Hanley.  9/12/17 - - endocrine consultation, Dr. Ortiz. Assessment of insulinoma: recommendation to follow Pro-insulin is a potential useful tumor marker for recurrence, to be followed by endocrinology, in context of glucose and C-peptide levels.  9/12/17 - - MRI  abdomen: IMPRESSION: In this patient with history of pancreatic head  insulinoma, post Whipple resection there is no evidence for metastatic  disease within the abdomen. Previously described lesion in the central  liver near the IVC is not well characterized in the present study.  9/15/17 - - oncology follow-up, Dr. Hanley.  1/16/18 - - MRI abdomen: IMPRESSION:   In this patient with history of insulinoma in the pancreatic head  status post Whipple procedure:  1. No evidence of metastatic disease within the abdomen.  2. Mild pancreatic duct ectasia measuring up to 6 mm. This is  unchanged to minimally increased when compared to the prior exam and  of uncertain clinical significance. No significant associated T1  abnormality to suggest pancreatitis.  1/19/18 - - oncology follow-up, Dr. Hanley.      Interim History/History Of Present Illness:   Mr. Joey Guerrero is a 42-year-old  gentleman from [place], [place] with pancreatic head insulinoma resected by Dr. Armenta December 8, 2016.    Joey presents for routine followup with his wife today.  As noted in the telephone message from 3 months ago several days after my last visit with him in which there was no evidence of progressive or recurrent disease, he searched the Internet and panicked.  He was concerned about some of the information he read on the Internet regarding pancreatic adenocarcinoma.  He also was questioning based on input from family members and friends the issue of whether or not he truly had a malignant cancer versus non.  We had an extensive conversation that was documented in the telephone note and which he says today provided some reassurance.  He is 13 months out from the Whipple resection performed by Dr. Matthias Armenta.  The insulinoma had negative resection margins and a Ki-67 of 5%.  He is generally doing well.  He denies any concerning symptoms that would be allied with insulinoma such as dizziness or any symptoms that would be consistent with  hypoglycemia.  He also denies any potential carcinoid-like symptoms.  He denies any pain or any other issues at this time.  His most recent surveillance scan was an MRI of the abdomen done on 01/16 that showed no evidence of recurrent malignancy.                   Review Of Systems:  Full 12-point ROS reviewed. Pertinent symptoms reviewed above per HPI.    Past medical, social, surgical, and family histories reviewed.     PAST MEDICAL HISTORY:  Pancreatic neuroendocrine tumor, pathologic T2 N0 M0 per above, status post Whipple surgery on 12/08/2016.  Other surgeries notable include an appendectomy in 2007.      FAMILY HISTORY:  Denies any family history of malignancy.      SOCIAL HISTORY:  The patient lives in [place], [place], with his wife.  They have 2 boys, ages 8 and 4.  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    Tobacco:  Used to chew and smoke occasional cigarettes since the age of 18 until recently.    Alcohol:  Denies alcohol abuse.    Drugs:  Denies illicit drug use.     Allergies:  Allergies as of 01/19/2018 - Thaddeus as Reviewed 01/16/2018   Allergen Reaction Noted     Pollen extract  12/05/2016     Cat hair extract  12/05/2016       Current Medications:  Current Outpatient Prescriptions   Medication Sig Dispense Refill     Cholecalciferol (VITAMIN D-3 PO) Take by mouth daily Pt unsure of dosage       vitamin B complex with vitamin C (VITAMIN  B COMPLEX) TABS tablet Take 1 tablet by mouth daily       Probiotic Product (PROBIOTIC ADVANCED) CAPS Take by mouth daily       ASPIRIN PO Take 81 mg by mouth       Multiple Vitamin (MULTI-VITAMINS) TABS        amylase-lipase-protease (CREON 6) 6000 UNITS CPEP Take 1-2 capsules (6,000-12,000 Units) by mouth 3 times daily (with meals) 450 capsule 3     Ascorbic Acid (VITAMIN C PO) Take by mouth 2 times daily        albuterol (PROAIR HFA, PROVENTIL HFA, VENTOLIN HFA) 108 (90 BASE) MCG/ACT inhaler Inhale 1-2 puffs into the lungs every 6 hours as  needed for shortness of breath / dyspnea or wheezing       fluticasone (FLOVENT HFA) 110 MCG/ACT inhaler Inhale 2 puffs into the lungs At Bedtime        Omega-3 Fatty Acids (OMEGA-3 FISH OIL PO) Take 1 g by mouth 2 times daily (with meals) 3 capsules          Physical Exam:  /83  Pulse 55  Temp 97.3  F (36.3  C) (Oral)  Resp 16  Wt 112.1 kg (247 lb 2.2 oz)  SpO2 98%  BMI 31.72 kg/m2      PHYSICAL EXAMINATION:     GENERAL:  Very pleasant, robust, young adult,  gentleman in no acute distress, alert and oriented x3, very well fit as he exercises a lot.   HEENT:  Anicteric sclerae.  Oropharynx is clear without mucositis or thrush.   CARDIOVASCULAR:  Regular rate and rhythm.  Normal S1, S2.  No murmurs, gallops or rubs.   LUNGS:  Clear to auscultation bilaterally.   ABDOMEN:  Benign.  A midline surgical scar has well-healed.  There is no overlying fluctuance, erythema or tenderness.   EXTREMITIES:  No edema noted.         Laboratory/Imaging Studies.  Results for orders placed or performed in visit on 01/16/18   C-peptide   Result Value Ref Range    C Peptide 1.0 0.9 - 6.9 ng/mL         RADIOLOGY:   I personally reviewed the MRI images from the abdomen, an MRI done on 01/16/2018.  I printed out a copy of the report and reviewed it in full with the patient and provided him and his wife with this copy.         ASSESSMENT/PLAN:  42-year-old gentleman with pancreatic head insulinoma, who presented with hypoglycemic episodes for several years prior to diagnosis.  He had Whipple surgery by Dr. Matthias Armenta on 12/08/2016.     We again generally reviewed the natural course, biology, diagnosis and treatment of pancreatic neuroendocrine tumors, and specifically focused on insulinomas.  We reviewed the difference between adenocarcinoma of the pancreas, which it is not, as compared to the 5-10% of pancreatic tumors that encompass neuroendocrine tumors in general.  He has a very specific subclass called  insulinoma.      The majority of today's discussion was geared toward providing reassurance to address his fears of potential cancer recurrence. I reviewed once again the fact that his tumor showed lack of poor prognostic features.  Good prognostic willfeatures including the Ki-67 mitotic index less than 5%, lack of perineural or perivascular invasion, lack of lymph node involvement, and also negative surgical margins.  This was all stated to the patient and his wife to provide reassurance that the likelihood of recurrence is extremely small, but I did state very clearly that it is not zero.  I stated that as he had insulinoma with typical symptoms prior to diagnosis, he should alert us at any time and quickly if he develops any such similar symptoms once again, as that would likely be the best marker of potential recurrence.  As the liver has the highest chance of being the anatomic site of recurrent disease, that presents the rationale for doing an MRI abdomen, as well as chromogranin A and proinsulin levels.     As he is now 13 months out from surgery, I would be comfortable with a followup MRI of the abdomen and check of chromogranin levels in 3-4 months.  I would feel comfortable in the 4-month range at this point, but if he prefers earlier, then 3 months would also be appropriate.  He also has a mid-March followup scheduled with Dr. Ortiz in Endocrinology.  We will go ahead and schedule the above.  He will notify us in the interim should any questions develop before the next visit.  In addition, he should follow up with his primary care provider regarding primary care appropriate issues.       I spent 30 minutes in consultation, including H&P and Discussion. >50% of time was spent in counseling and in coordination of care.    Brandan Hanley MD, PhD       cc:   Matthias Armenta MD            Hepatobiliary Surgery          [facility]                     Guru Missael MD          [facility]  [place]

## 2018-01-22 LAB — PROINSULIN P 12H FAST SERPL-SCNC: 2.1 PMOL/L

## 2018-05-10 ENCOUNTER — RADIANT APPOINTMENT (OUTPATIENT)
Dept: MRI IMAGING | Facility: CLINIC | Age: 43
End: 2018-05-10
Attending: INTERNAL MEDICINE
Payer: COMMERCIAL

## 2018-05-10 DIAGNOSIS — C25.4 INSULINOMA, MALIGNANT (H): ICD-10-CM

## 2018-05-10 LAB
ALBUMIN SERPL-MCNC: 3.5 G/DL (ref 3.4–5)
ALP SERPL-CCNC: 36 U/L (ref 40–150)
ALT SERPL W P-5'-P-CCNC: 64 U/L (ref 0–70)
ANION GAP SERPL CALCULATED.3IONS-SCNC: 6 MMOL/L (ref 3–14)
AST SERPL W P-5'-P-CCNC: 47 U/L (ref 0–45)
BASOPHILS # BLD AUTO: 0 10E9/L (ref 0–0.2)
BASOPHILS NFR BLD AUTO: 0.8 %
BILIRUB SERPL-MCNC: 0.4 MG/DL (ref 0.2–1.3)
BUN SERPL-MCNC: 25 MG/DL (ref 7–30)
CALCIUM SERPL-MCNC: 8.3 MG/DL (ref 8.5–10.1)
CHLORIDE SERPL-SCNC: 105 MMOL/L (ref 94–109)
CO2 SERPL-SCNC: 26 MMOL/L (ref 20–32)
CREAT SERPL-MCNC: 0.86 MG/DL (ref 0.66–1.25)
DIFFERENTIAL METHOD BLD: ABNORMAL
EOSINOPHIL # BLD AUTO: 0.1 10E9/L (ref 0–0.7)
EOSINOPHIL NFR BLD AUTO: 3.6 %
ERYTHROCYTE [DISTWIDTH] IN BLOOD BY AUTOMATED COUNT: 12.3 % (ref 10–15)
GFR SERPL CREATININE-BSD FRML MDRD: >90 ML/MIN/1.7M2
GLUCOSE SERPL-MCNC: 104 MG/DL (ref 70–99)
HCT VFR BLD AUTO: 40.4 % (ref 40–53)
HGB BLD-MCNC: 13.4 G/DL (ref 13.3–17.7)
IMM GRANULOCYTES # BLD: 0 10E9/L (ref 0–0.4)
IMM GRANULOCYTES NFR BLD: 0 %
LYMPHOCYTES # BLD AUTO: 1.5 10E9/L (ref 0.8–5.3)
LYMPHOCYTES NFR BLD AUTO: 42.2 %
MCH RBC QN AUTO: 30.9 PG (ref 26.5–33)
MCHC RBC AUTO-ENTMCNC: 33.2 G/DL (ref 31.5–36.5)
MCV RBC AUTO: 93 FL (ref 78–100)
MONOCYTES # BLD AUTO: 0.4 10E9/L (ref 0–1.3)
MONOCYTES NFR BLD AUTO: 10.6 %
NEUTROPHILS # BLD AUTO: 1.5 10E9/L (ref 1.6–8.3)
NEUTROPHILS NFR BLD AUTO: 42.8 %
NRBC # BLD AUTO: 0 10*3/UL
NRBC BLD AUTO-RTO: 0 /100
PLATELET # BLD AUTO: 206 10E9/L (ref 150–450)
POTASSIUM SERPL-SCNC: 4.1 MMOL/L (ref 3.4–5.3)
PROT SERPL-MCNC: 6.8 G/DL (ref 6.8–8.8)
RBC # BLD AUTO: 4.34 10E12/L (ref 4.4–5.9)
SODIUM SERPL-SCNC: 137 MMOL/L (ref 133–144)
WBC # BLD AUTO: 3.6 10E9/L (ref 4–11)

## 2018-05-10 RX ORDER — GADOBUTROL 604.72 MG/ML
10 INJECTION INTRAVENOUS ONCE
Status: COMPLETED | OUTPATIENT
Start: 2018-05-10 | End: 2018-05-10

## 2018-05-10 RX ADMIN — GADOBUTROL 10 ML: 604.72 INJECTION INTRAVENOUS at 10:25

## 2018-05-10 NOTE — DISCHARGE INSTRUCTIONS
MRI Contrast Discharge Instructions    The IV contrast you received today will pass out of your body in your  urine. This will happen in the next 24 hours. You will not feel this process.  Your urine will not change color.    Drink at least 4 extra glasses of water or juice today (unless your doctor  has restricted your fluids). This reduces the stress on your kidneys.  You may take your regular medicines.    If you are on dialysis: It is best to have dialysis today.    If you have a reaction: Most reactions happen right away. If you have  any new symptoms after leaving the hospital (such as hives or swelling),  call your hospital at the correct number below. Or call your family doctor.  If you have breathing distress or wheezing, call 911.    Special instructions: ***    I have read and understand the above information.    Signature:______________________________________ Date:___________    Staff:__________________________________________ Date:___________     Time:__________    [facility] Departments:    ___[place]: [phone]  ___[facility]: [phone]  ___[place]: [phone] ___[facility]: [phone]  ___[facility]: [phone]  ___[facility]: [phone]  ___[phone]  ___[facility]: [phone]  ___[phone]

## 2018-05-12 LAB — CGA SERPL-MCNC: 53 NG/ML (ref 0–95)

## 2018-05-14 ENCOUNTER — ONCOLOGY VISIT (OUTPATIENT)
Dept: ONCOLOGY | Facility: CLINIC | Age: 43
End: 2018-05-14
Attending: INTERNAL MEDICINE
Payer: COMMERCIAL

## 2018-05-14 VITALS
BODY MASS INDEX: 32.07 KG/M2 | SYSTOLIC BLOOD PRESSURE: 120 MMHG | WEIGHT: 249.9 LBS | HEIGHT: 74 IN | DIASTOLIC BLOOD PRESSURE: 72 MMHG | RESPIRATION RATE: 16 BRPM | HEART RATE: 60 BPM | TEMPERATURE: 97 F | OXYGEN SATURATION: 99 %

## 2018-05-14 DIAGNOSIS — D13.7 INSULINOMA: Primary | ICD-10-CM

## 2018-05-14 PROCEDURE — 99214 OFFICE O/P EST MOD 30 MIN: CPT | Mod: GC | Performed by: INTERNAL MEDICINE

## 2018-05-14 PROCEDURE — G0463 HOSPITAL OUTPT CLINIC VISIT: HCPCS | Mod: ZF

## 2018-05-14 ASSESSMENT — PAIN SCALES - GENERAL: PAINLEVEL: NO PAIN (0)

## 2018-05-14 NOTE — LETTER
5/14/2018       RE: Joey Guerrero  [address] [place] [address]     Dear Colleague,    Thank you for referring your patient, Joey Guerrero, to the [facility]. Please see a copy of my visit note below.    Oncology Follow-up Visit:  May 14, 2018    Diagnosis: [id number] pancreatic head insulinoma.  No evidence of metastatic disease.     Treatment to date: Whipple resection December 8, 2016, by Dr. Matthias Armenta. Zero of 10 lymph nodes involved, grade 2, negative resection margins, Ki-67 equals 5%.  preoperative serum chromogranin A = 70 on 12/05/2016.     REFERRING PHYSICIAN:  Dr. Matthias Armenta, Hepatobiliary Surgery, [facility].      Oncology History:  Over a 2-3 year period, beginning in 2014 or so, he was having hypoglycemic episodes of unexplained origin.  He was even hospitalized for one such episode in the fall of 2016.  He met with a primary care physician, and subsequently an endocrinologist.  There was concern for hypoglycemic cause and concern for potential insulinoma.  On 10/31/2016, MRI at [facility] revealed a 3.9 x 3.7 x 3.6 cm mass in the head of the pancreas.  There was involvement of the superior mesenteric vein, but no evidence of metastatic disease noted.  He met with Dr. Migel Sena at [facility], and a referral was made for surgical evaluation.      On 11/11/2016, a CT abdomen and pelvis was performed revealing an enhancing lesion with a cystic component versus central necrosis in the head and the uncinate process of the pancreas.  There was an indeterminate portacaval lymph node measuring 2.1 cm.  Otherwise, no evidence of suspicious adenopathy in the abdomen.  EUS was performed by Dr. Guru Canas at the [facility] on 11/18/2016.  He performed an FNA.  This revealed the tumor was composed of grade 1 neuroendocrine tumor with a low proliferation marker of Ki-67 less than 2%.  The tumor was positive for synaptophysin, chromogranin  and CD56.      On 12/01/2016, the patient underwent a preoperative octreotide scan that did not show any evidence of metastatic disease or tumor.  There was no radiotracer uptake.  A baseline chromogranin A was 70.      His C peptide at baseline was 0.7.  Insulin level at the preoperative baseline was 2.2 and 3.5.  Proinsulin levels were less than 7.5, and a subsequent level was 9.6.     On 12/05/2016, the patient met with Dr. Matthias Armenta, who took the patient to the operating room on 12/08/2016 to perform a Whipple.  The pancreaticoduodenectomy resulted in the revealing of a pancreatic neuroendocrine tumor at the head of the pancreas that measured 5.1 cm with intermediate grade G2.  Margins were negative by 0.1 cm.  Zero of 12 lymph nodes were involved.  The Ki-67 registered at 5% with 4 mitoses/10 high-power fields.  There was no perivascular, nor perineural invasion.  The pathologic stage was T2.  The pathologic N stage was N0, and M0 per the prior octreotide scan.  The patient is currently 9 weeks out from surgery.    2/6/17 - - medical oncology consultation, Dr. Hanley.  6/5/17 - - MRI abdomen: IMPRESSION:   1. Postsurgical of Whipple procedure evidence of locally recurrent  neuroendocrine tumor.  2. 1.2 cm ill-defined hepatic lesion in the central liver. This is  indeterminate and could represent sequela of recent episode of  inflammation or infection. However, it is new from prior and  metastases are not excluded. Consider short-term follow-up MRI.  3. Mildly increased size of pericaval lymph node and unchanged mildly  enlarged randy hepatis lymph node, indeterminate but favored to be  reactive.  6/9/17 - - oncology follow-up, Dr. Hanley.  9/12/17 - - endocrine consultation, Dr. Ortiz. Assessment of insulinoma: recommendation to follow Pro-insulin is a potential useful tumor marker for recurrence, to be followed by endocrinology, in context of glucose and C-peptide levels.  9/12/17 - - MRI abdomen: IMPRESSION:  In this patient with history of pancreatic head  insulinoma, post Whipple resection there is no evidence for metastatic  disease within the abdomen. Previously described lesion in the central  liver near the IVC is not well characterized in the present study.  9/15/17 - - oncology follow-up, Dr. Hanley.  1/16/18 - - MRI abdomen: IMPRESSION:   In this patient with history of insulinoma in the pancreatic head  status post Whipple procedure:  1. No evidence of metastatic disease within the abdomen.  2. Mild pancreatic duct ectasia measuring up to 6 mm. This is  unchanged to minimally increased when compared to the prior exam and  of uncertain clinical significance. No significant associated T1  abnormality to suggest pancreatitis.  1/19/18 - - oncology follow-up, Dr. Hanley.  5/10/18 - - MRI abdomen IMPRESSION: In this patient with history of neuroendocrine tumor of  the pancreas and subsequent Whipple procedure:  1. Slightly increased size of a precaval lymph node, currently  measuring 1.5 x 2.0 cm comparison measured 1.3 x 1.6 cm on 1/16/2018  exam. This is indeterminate  2. Mild persistent dilatation of the main pancreatic duct measuring 6mm.    5/14/18 - - oncology follow-up, Dr. Hanley.    Interim History/History Of Present Illness:  Mr. Joey Guerrero is a 42 y/o  man with pancreatic head insulinoma resected by Dr. Armenta on December 8, 2016.  Joey is here with his wife for 4 month f/u. He is doing well with no new health concerns. No recurrence of hypoglycemic symptoms that he had prior to surgical resection. In particular, no LH, dizziness, diaphoresis, or confusion.   He works as a  in [employer]. Weight is stable, no fatigue.       Review Of Systems:  Full 12-point ROS reviewed. Pertinent symptoms reviewed above per HPI.    Past medical, social, surgical, and family histories reviewed.     PAST MEDICAL HISTORY:  Pancreatic neuroendocrine tumor, pathologic T2 N0 M0 per above, status post Whipple surgery  "on 12/08/2016.  Other surgeries notable include an appendectomy in 2007.      FAMILY HISTORY:  Denies any family history of malignancy.      SOCIAL HISTORY:  The patient lives in [place], [place], with his wife.  They have 2 boys, ages 8 and 4.  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    Tobacco:  Used to chew and smoke occasional cigarettes since the age of 18 until recently.    Alcohol:  Denies alcohol abuse.    Drugs:  Denies illicit drug use.     Allergies:  Allergies as of 05/14/2018 - Thaddeus as Reviewed 05/14/2018   Allergen Reaction Noted     Pollen extract  12/05/2016     Cat hair extract  12/05/2016       Current Medications:  Current Outpatient Prescriptions   Medication Sig Dispense Refill     albuterol (PROAIR HFA, PROVENTIL HFA, VENTOLIN HFA) 108 (90 BASE) MCG/ACT inhaler Inhale 1-2 puffs into the lungs every 6 hours as needed for shortness of breath / dyspnea or wheezing       amylase-lipase-protease (CREON 6) 6000 UNITS CPEP Take 1-2 capsules (6,000-12,000 Units) by mouth 3 times daily (with meals) 450 capsule 3     Ascorbic Acid (VITAMIN C PO) Take by mouth 2 times daily        ASPIRIN PO Take 81 mg by mouth       Cholecalciferol (VITAMIN D-3 PO) Take by mouth daily Pt unsure of dosage       fluticasone (FLOVENT HFA) 110 MCG/ACT inhaler Inhale 2 puffs into the lungs At Bedtime        Multiple Vitamin (MULTI-VITAMINS) TABS        Omega-3 Fatty Acids (OMEGA-3 FISH OIL PO) Take 1 g by mouth 2 times daily (with meals) 3 capsules       Probiotic Product (PROBIOTIC ADVANCED) CAPS Take by mouth daily       vitamin B complex with vitamin C (VITAMIN  B COMPLEX) TABS tablet Take 1 tablet by mouth daily          Physical Exam:  /72 (BP Location: Right arm, Patient Position: Chair, Cuff Size: Adult Large)  Pulse 60  Temp 97  F (36.1  C) (Oral)  Resp 16  Ht 1.88 m (6' 2.02\")  Wt 113.4 kg (249 lb 14.4 oz)  SpO2 99%  BMI 32.07 kg/m2      PHYSICAL EXAMINATION:     GENERAL:  " Very pleasant, robust,  gentleman in no acute distress, alert and oriented x3  HEENT:  Anicteric sclerae.  Oropharynx is clear without mucositis or thrush.   CARDIOVASCULAR:  Regular rate and rhythm.  Normal S1, S2.  No murmurs, gallops or rubs.   LUNGS:  Clear to auscultation bilaterally.   ABDOMEN:  Soft, nt/nd.  A midline surgical scar has well-healed.    EXTREMITIES:  No edema noted.       Laboratory/Imaging Studies.  Results for YESENIA TORRES (MRN [id number]) as of 5/13/2018 20:56   Ref. Range 6/5/2017 10:51 9/12/2017 15:39 1/16/2018 11:48 5/10/2018 10:39   Chromogranin A Latest Ref Range: 0 - 95 ng/mL 53 62 77 53     CBC RESULTS:   Recent Labs   Lab Test  05/10/18   1039   WBC  3.6*   RBC  4.34*   HGB  13.4   HCT  40.4   MCV  93   MCH  30.9   MCHC  33.2   RDW  12.3   PLT  206     Recent Labs   Lab Test  05/10/18   1039  09/12/17   1539  06/05/17   1051   NA  137   --   137   POTASSIUM  4.1   --   4.1   CHLORIDE  105   --   102   CO2  26   --   25   ANIONGAP  6   --   9   GLC  104*  88  90   BUN  25   --   30   CR  0.86   --   1.02   LESTER  8.3*   --   9.0     Liver Function Studies -   Recent Labs   Lab Test  05/10/18   1039   PROTTOTAL  6.8   ALBUMIN  3.5   BILITOTAL  0.4   ALKPHOS  36*   AST  47*   ALT  64       RADIOLOGY:   I personally reviewed the MRI images from the abdomen, an MRI done on 5/10/2018.  I printed out a copy of the report and reviewed it in full with the patient and provided him and his wife with this copy.       ASSESSMENT/PLAN:  44 y/o man with pancreatic head insulinoma, who presented with hypoglycemic episodes for several years prior to diagnosis. He had Whipple surgery by Dr. Matthias Armenta on 12/08/2016 (pT2NM0).     He is here for 4 month follow-up. MRI shows an indeterminate precaval lymph node that is slightly larger compared to January, but no definitive evidence of metastatic disease. Chromogranin A level is normal however this was not elevated prior to surgical resection.  Most importantly, he has no recurrence of hypoglycemic symptoms.     We discussed that the enlarged precaval lymph node is indeterminate. This is more likely to be reactive and less unlikely to be metastatic insulinoma, although that is not impossible. Given he is asymptomatic, we can continue to follow this on imaging. Joey and his wife are comfortable with that plan.     We will have him follow-up in 6 months with an MRI abdomen and lab work (CBC, CMP, chromogranin A) one week prior to the appointment.   He will call us with any concerning symptoms in the interim.     Patient seen and discussed with staff Dr. Dayan Palacios MD  Heme/Onc Fellow  Pager: [phone]          MEDICAL ONCOLOGY ATTENDING PHYSICIAN ADDENDUM:  I have seen and evaluated this patient with the medical oncology fellow. I have reviewed and edited this fellow's note, and agree with the assessment and plan stated above. A complete review of systems was assessed, and pertinent positives/negatives are discussed in detail above and as follows.    Mr. Guerrero returns today in the company of his wife.  He is nearly a year and a half out from his Whipple resection by Dr. Matthias Armenta.  There were no significantly poor prognostic features.  His Ki-67 was exactly 5%, and his tumor size was 5.1 cm at the time of diagnosis.  He had insulinoma-like symptoms, none of which have recurred.  Over the last year and a half plus that I have known him, he has a very strong level of anxiety regarding any potential for recurrence.  He gets a significant amount of anxiety with each scan, so we review it very carefully, as we did today.  We printed out a copy of the report and reviewed it in full with the patient and his wife.  I also performed an exam that was unremarkable.  Most importantly and tellingly, he does not have any insulinoma-like symptoms that would be of concern.  At this point, he has not had any dizziness, any symptoms that would indicate he is  "having fluctuating serum glucose or other issues.  My goal is to provide a great deal of reassurance to him.  The MRI did show and reported it as a \"slightly increased size of a precaval lymph node\" compared to about 5 months ago, about a couple of millimeters difference.  It is indeterminate.  I explained that most likely that when an insulinoma or pancreatic neuroendocrine tumors in general do recur from the pancreatic head, most likely the site of recurrence would be the liver parenchyma itself, as there is no indication that there are any changes.  I do not think with a relatively low risk of recurrence that there is any strong indication to do anything about that.  Finally, it was noted that I continue to monitor.  He by the end of the visit agreed to a 5-6 month monitoring followup with labs.  He will have that done in 6 months' time and follow up with me within 1 week to review the results.  Again, I did describe insulinoma-like symptoms and encouraged him to call in the interim should he have any questions before our next visit or have any symptoms thereof.  He will also follow up with Dr. Ortiz from our excellent Endocrinology team on around 06/12, and that appointment has already been scheduled.     I spent 30 minutes in consultation, including H&P and Discussion. >50% of time was spent in counseling and in coordination of care.    Brandan Hanley MD, PhD        "

## 2018-05-14 NOTE — PROGRESS NOTES
Oncology Follow-up Visit:  May 14, 2018    Diagnosis: uR5H2H1 pancreatic head insulinoma.  No evidence of metastatic disease.     Treatment to date: Whipple resection December 8, 2016, by Dr. Matthias Armenta. Zero of 10 lymph nodes involved, grade 2, negative resection margins, Ki-67 equals 5%.  preoperative serum chromogranin A = 70 on 12/05/2016.     REFERRING PHYSICIAN:  Dr. Matthias Armenta, Hepatobiliary Surgery, [facility].      Oncology History:  Over a 2-3 year period, beginning in 2014 or so, he was having hypoglycemic episodes of unexplained origin.  He was even hospitalized for one such episode in the fall of 2016.  He met with a primary care physician, and subsequently an endocrinologist.  There was concern for hypoglycemic cause and concern for potential insulinoma.  On 10/31/2016, MRI at [facility] revealed a 3.9 x 3.7 x 3.6 cm mass in the head of the pancreas.  There was involvement of the superior mesenteric vein, but no evidence of metastatic disease noted.  He met with Dr. Migel Sena at [facility], and a referral was made for surgical evaluation.      On 11/11/2016, a CT abdomen and pelvis was performed revealing an enhancing lesion with a cystic component versus central necrosis in the head and the uncinate process of the pancreas.  There was an indeterminate portacaval lymph node measuring 2.1 cm.  Otherwise, no evidence of suspicious adenopathy in the abdomen.  EUS was performed by Dr. Guru Canas at the [facility] on 11/18/2016.  He performed an FNA.  This revealed the tumor was composed of grade 1 neuroendocrine tumor with a low proliferation marker of Ki-67 less than 2%.  The tumor was positive for synaptophysin, chromogranin and CD56.      On 12/01/2016, the patient underwent a preoperative octreotide scan that did not show any evidence of metastatic disease or tumor.  There was no radiotracer uptake.  A baseline chromogranin A was 70.      His C peptide  at baseline was 0.7.  Insulin level at the preoperative baseline was 2.2 and 3.5.  Proinsulin levels were less than 7.5, and a subsequent level was 9.6.     On 12/05/2016, the patient met with Dr. Matthias Armenta, who took the patient to the operating room on 12/08/2016 to perform a Whipple.  The pancreaticoduodenectomy resulted in the revealing of a pancreatic neuroendocrine tumor at the head of the pancreas that measured 5.1 cm with intermediate grade G2.  Margins were negative by 0.1 cm.  Zero of 12 lymph nodes were involved.  The Ki-67 registered at 5% with 4 mitoses/10 high-power fields.  There was no perivascular, nor perineural invasion.  The pathologic stage was T2.  The pathologic N stage was N0, and M0 per the prior octreotide scan.  The patient is currently 9 weeks out from surgery.    2/6/17 - - medical oncology consultation, Dr. Hanley.  6/5/17 - - MRI abdomen: IMPRESSION:   1. Postsurgical of Whipple procedure evidence of locally recurrent  neuroendocrine tumor.  2. 1.2 cm ill-defined hepatic lesion in the central liver. This is  indeterminate and could represent sequela of recent episode of  inflammation or infection. However, it is new from prior and  metastases are not excluded. Consider short-term follow-up MRI.  3. Mildly increased size of pericaval lymph node and unchanged mildly  enlarged randy hepatis lymph node, indeterminate but favored to be  reactive.  6/9/17 - - oncology follow-up, Dr. Hanley.  9/12/17 - - endocrine consultation, Dr. Ortiz. Assessment of insulinoma: recommendation to follow Pro-insulin is a potential useful tumor marker for recurrence, to be followed by endocrinology, in context of glucose and C-peptide levels.  9/12/17 - - MRI abdomen: IMPRESSION: In this patient with history of pancreatic head  insulinoma, post Whipple resection there is no evidence for metastatic  disease within the abdomen. Previously described lesion in the central  liver near the IVC is not well  characterized in the present study.  9/15/17 - - oncology follow-up, Dr. Hanley.  1/16/18 - - MRI abdomen: IMPRESSION:   In this patient with history of insulinoma in the pancreatic head  status post Whipple procedure:  1. No evidence of metastatic disease within the abdomen.  2. Mild pancreatic duct ectasia measuring up to 6 mm. This is  unchanged to minimally increased when compared to the prior exam and  of uncertain clinical significance. No significant associated T1  abnormality to suggest pancreatitis.  1/19/18 - - oncology follow-up, Dr. Hanley.  5/10/18 - - MRI abdomen IMPRESSION: In this patient with history of neuroendocrine tumor of  the pancreas and subsequent Whipple procedure:  1. Slightly increased size of a precaval lymph node, currently  measuring 1.5 x 2.0 cm comparison measured 1.3 x 1.6 cm on 1/16/2018  exam. This is indeterminate  2. Mild persistent dilatation of the main pancreatic duct measuring 6mm.    5/14/18 - - oncology follow-up, Dr. Hanley.    Interim History/History Of Present Illness:  Mr. Joey Guerrero is a 42 y/o  man with pancreatic head insulinoma resected by Dr. Armenta on December 8, 2016.  Joey is here with his wife for 4 month f/u. He is doing well with no new health concerns. No recurrence of hypoglycemic symptoms that he had prior to surgical resection. In particular, no LH, dizziness, diaphoresis, or confusion.   He works as a  in [employer]. Weight is stable, no fatigue.       Review Of Systems:  Full 12-point ROS reviewed. Pertinent symptoms reviewed above per HPI.    Past medical, social, surgical, and family histories reviewed.     PAST MEDICAL HISTORY:  Pancreatic neuroendocrine tumor, pathologic T2 N0 M0 per above, status post Whipple surgery on 12/08/2016.  Other surgeries notable include an appendectomy in 2007.      FAMILY HISTORY:  Denies any family history of malignancy.      SOCIAL HISTORY:  The patient lives in [place], [place], with his wife.  They have  "2 boys, ages 8 and 4.  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    Tobacco:  Used to chew and smoke occasional cigarettes since the age of 18 until recently.    Alcohol:  Denies alcohol abuse.    Drugs:  Denies illicit drug use.     Allergies:  Allergies as of 05/14/2018 - Thaddeus as Reviewed 05/14/2018   Allergen Reaction Noted     Pollen extract  12/05/2016     Cat hair extract  12/05/2016       Current Medications:  Current Outpatient Prescriptions   Medication Sig Dispense Refill     albuterol (PROAIR HFA, PROVENTIL HFA, VENTOLIN HFA) 108 (90 BASE) MCG/ACT inhaler Inhale 1-2 puffs into the lungs every 6 hours as needed for shortness of breath / dyspnea or wheezing       amylase-lipase-protease (CREON 6) 6000 UNITS CPEP Take 1-2 capsules (6,000-12,000 Units) by mouth 3 times daily (with meals) 450 capsule 3     Ascorbic Acid (VITAMIN C PO) Take by mouth 2 times daily        ASPIRIN PO Take 81 mg by mouth       Cholecalciferol (VITAMIN D-3 PO) Take by mouth daily Pt unsure of dosage       fluticasone (FLOVENT HFA) 110 MCG/ACT inhaler Inhale 2 puffs into the lungs At Bedtime        Multiple Vitamin (MULTI-VITAMINS) TABS        Omega-3 Fatty Acids (OMEGA-3 FISH OIL PO) Take 1 g by mouth 2 times daily (with meals) 3 capsules       Probiotic Product (PROBIOTIC ADVANCED) CAPS Take by mouth daily       vitamin B complex with vitamin C (VITAMIN  B COMPLEX) TABS tablet Take 1 tablet by mouth daily          Physical Exam:  /72 (BP Location: Right arm, Patient Position: Chair, Cuff Size: Adult Large)  Pulse 60  Temp 97  F (36.1  C) (Oral)  Resp 16  Ht 1.88 m (6' 2.02\")  Wt 113.4 kg (249 lb 14.4 oz)  SpO2 99%  BMI 32.07 kg/m2      PHYSICAL EXAMINATION:     GENERAL:  Very pleasant, robust,  gentleman in no acute distress, alert and oriented x3  HEENT:  Anicteric sclerae.  Oropharynx is clear without mucositis or thrush.   CARDIOVASCULAR:  Regular rate and rhythm.  Normal S1, S2.  " No murmurs, gallops or rubs.   LUNGS:  Clear to auscultation bilaterally.   ABDOMEN:  Soft, nt/nd.  A midline surgical scar has well-healed.    EXTREMITIES:  No edema noted.       Laboratory/Imaging Studies.  Results for YESENIA TORRES (MRN [id number]) as of 5/13/2018 20:56   Ref. Range 6/5/2017 10:51 9/12/2017 15:39 1/16/2018 11:48 5/10/2018 10:39   Chromogranin A Latest Ref Range: 0 - 95 ng/mL 53 62 77 53     CBC RESULTS:   Recent Labs   Lab Test  05/10/18   1039   WBC  3.6*   RBC  4.34*   HGB  13.4   HCT  40.4   MCV  93   MCH  30.9   MCHC  33.2   RDW  12.3   PLT  206     Recent Labs   Lab Test  05/10/18   1039  09/12/17   1539  06/05/17   1051   NA  137   --   137   POTASSIUM  4.1   --   4.1   CHLORIDE  105   --   102   CO2  26   --   25   ANIONGAP  6   --   9   GLC  104*  88  90   BUN  25   --   30   CR  0.86   --   1.02   LESTER  8.3*   --   9.0     Liver Function Studies -   Recent Labs   Lab Test  05/10/18   1039   PROTTOTAL  6.8   ALBUMIN  3.5   BILITOTAL  0.4   ALKPHOS  36*   AST  47*   ALT  64       RADIOLOGY:   I personally reviewed the MRI images from the abdomen, an MRI done on 5/10/2018.  I printed out a copy of the report and reviewed it in full with the patient and provided him and his wife with this copy.       ASSESSMENT/PLAN:  44 y/o man with pancreatic head insulinoma, who presented with hypoglycemic episodes for several years prior to diagnosis. He had Whipple surgery by Dr. Matthias Armenta on 12/08/2016 (pT2NM0).     He is here for 4 month follow-up. MRI shows an indeterminate precaval lymph node that is slightly larger compared to January, but no definitive evidence of metastatic disease. Chromogranin A level is normal however this was not elevated prior to surgical resection. Most importantly, he has no recurrence of hypoglycemic symptoms.     We discussed that the enlarged precaval lymph node is indeterminate. This is more likely to be reactive and less unlikely to be metastatic insulinoma, although  "that is not impossible. Given he is asymptomatic, we can continue to follow this on imaging. Joey and his wife are comfortable with that plan.     We will have him follow-up in 6 months with an MRI abdomen and lab work (CBC, CMP, chromogranin A) one week prior to the appointment.   He will call us with any concerning symptoms in the interim.     Patient seen and discussed with staff Dr. Dayan Palacios MD  Heme/Onc Fellow  Pager: [phone]          MEDICAL ONCOLOGY ATTENDING PHYSICIAN ADDENDUM:  I have seen and evaluated this patient with the medical oncology fellow. I have reviewed and edited this fellow's note, and agree with the assessment and plan stated above. A complete review of systems was assessed, and pertinent positives/negatives are discussed in detail above and as follows.    Mr. Guerrero returns today in the company of his wife.  He is nearly a year and a half out from his Whipple resection by Dr. Matthias Armenta.  There were no significantly poor prognostic features.  His Ki-67 was exactly 5%, and his tumor size was 5.1 cm at the time of diagnosis.  He had insulinoma-like symptoms, none of which have recurred.  Over the last year and a half plus that I have known him, he has a very strong level of anxiety regarding any potential for recurrence.  He gets a significant amount of anxiety with each scan, so we review it very carefully, as we did today.  We printed out a copy of the report and reviewed it in full with the patient and his wife.  I also performed an exam that was unremarkable.  Most importantly and tellingly, he does not have any insulinoma-like symptoms that would be of concern.  At this point, he has not had any dizziness, any symptoms that would indicate he is having fluctuating serum glucose or other issues.  My goal is to provide a great deal of reassurance to him.  The MRI did show and reported it as a \"slightly increased size of a precaval lymph node\" compared to about 5 months ago, " about a couple of millimeters difference.  It is indeterminate.  I explained that most likely that when an insulinoma or pancreatic neuroendocrine tumors in general do recur from the pancreatic head, most likely the site of recurrence would be the liver parenchyma itself, as there is no indication that there are any changes.  I do not think with a relatively low risk of recurrence that there is any strong indication to do anything about that.  Finally, it was noted that I continue to monitor.  He by the end of the visit agreed to a 5-6 month monitoring followup with labs.  He will have that done in 6 months' time and follow up with me within 1 week to review the results.  Again, I did describe insulinoma-like symptoms and encouraged him to call in the interim should he have any questions before our next visit or have any symptoms thereof.  He will also follow up with Dr. Ortiz from our excellent Endocrinology team on around 06/12, and that appointment has already been scheduled.           I spent 30 minutes in consultation, including H&P and Discussion. >50% of time was spent in counseling and in coordination of care.    Brandan Hanley MD, PhD

## 2018-05-14 NOTE — NURSING NOTE
"Oncology Rooming Note    May 14, 2018 9:32 AM   Joey Guerrero is a 43 year old male who presents for:    Chief Complaint   Patient presents with     Oncology Clinic Visit     Return: Neuroendocrine Tumor      Initial Vitals: /72 (BP Location: Right arm, Patient Position: Chair, Cuff Size: Adult Large)  Pulse 60  Temp 97  F (36.1  C) (Oral)  Resp 16  Ht 1.88 m (6' 2.02\")  Wt 113.4 kg (249 lb 14.4 oz)  SpO2 99%  BMI 32.07 kg/m2 Estimated body mass index is 32.07 kg/(m^2) as calculated from the following:    Height as of this encounter: 1.88 m (6' 2.02\").    Weight as of this encounter: 113.4 kg (249 lb 14.4 oz). Body surface area is 2.43 meters squared.  No Pain (0) Comment: Data Unavailable   No LMP for male patient.  Allergies reviewed: YES  Medications reviewed: YES    Medications: Medication refills not needed today.  Pharmacy name entered into [facility]:    [facility] [address] [place] [id number] [facility] [address] [place], [place] - [address] AT [facility] OF  & MAIN    Clinical concerns: no new concerns.  Pt received flu shot elsewhere. See Immunizations     6 minutes for nursing intake (face to face time)     Italia Lopez CMA                "

## 2018-05-14 NOTE — MR AVS SNAPSHOT
After Visit Summary   5/14/2018    Joey Guerrero    MRN: [id number]           Patient Information     Date Of Birth          1975        Visit Information        Provider Department      5/14/2018 9:15 AM Brandan Hanley MD [facility]        Today's Diagnoses     Insulinoma    -  1       Follow-ups after your visit        Follow-up notes from your care team     Return in about 6 months (around 11/14/2018).      Your next 10 appointments already scheduled     Jun 12, 2018  1:40 PM CDT   (Arrive by 1:25 PM)   RETURN ENDOCRINE with Eva Ortiz MD   [facility] ([facility] [place])    [address] [facility]  3rd Floor  [facility] [address] [phone]            Oct 18, 2018 12:15 PM CDT   MR ABDOMEN W/O & W CONTRAST with [address] [facility] [place] [facility] ([facility])    [address]  [address]st [facility] [address] [phone]           Take your medicines as usual, unless your doctor tells you not to. Bring a list of your current medicines to your exam (including vitamins, minerals and over-the-counter drugs). Also bring the results of similar scans you may have had.    You may or may not receive IV contrast for this exam pending the discretion of the Radiologist.   Do not eat or drink for 6 hours prior to exam.  The MRI machine uses a strong magnet. Please wear clothes without metal (snaps, zippers). A sweatsuit works well, or we may give you a hospital gown.  Please remove any body piercings and hair extensions before you arrive. You will also remove watches, jewelry, hairpins, wallets, dentures, partial dental plates and hearing aids. You may wear contact lenses, and you may be able to wear your rings. We have a safe place to keep your personal items, but it is safer to leave them at home.  **IMPORTANT** THE INSTRUCTIONS BELOW ARE ONLY FOR THOSE PATIENTS WHO HAVE BEEN PRESCRIBED SEDATION OR GENERAL  ANESTHESIA DURING THEIR MRI PROCEDURE:  IF YOUR DOCTOR PRESCRIBED ORAL SEDATION (take medicine to help you relax during your exam):   You must get the medicine from your doctor (oral medication) before you arrive. Bring the medicine to the exam. Do not take it at home. You ll be told when to take it upon arriving for your exam.   Arrive one hour early. Bring someone who can take you home after the test. Your medicine will make you sleepy. After the exam, you may not drive, take a bus or take a taxi by yourself.  IF YOUR DOCTOR PRESCRIBED IV SEDATION:   Arrive one hour early. Bring someone who can take you home after the test. Your medicine will make you sleepy. After the exam, you may not drive, take a bus or take a taxi by yourself.   No eating 6 hours before your exam. You may have clear liquids up until 4 hours before your exam. (Clear liquids include water, clear tea, black coffee and fruit juice without pulp.)  IF YOUR DOCTOR PRESCRIBED ANESTHESIA (be asleep for your exam):   Arrive 1 1/2 hours early. Bring someone who can take you home after the test. You may not drive, take a bus or take a taxi by yourself.   No eating 8 hours before your exam. You may have clear liquids up until 4 hours before your exam. (Clear liquids include water, clear tea, black coffee and fruit juice without pulp.)   You will spend four to five hours in the recovery room.  If you have any questions, please contact your Imaging Department exam site.            Oct 18, 2018  1:00 PM CDT   LAB with [facility] ([facility])    [address] [place] [address] [phone]           Please do not eat 10-12 hours before your appointment if you are coming in fasting for labs on lipids, cholesterol, or glucose (sugar). This does not apply to pregnant women. Water, hot tea and black coffee (with nothing added) are okay. Do not drink other fluids, diet soda or chew gum.             "Oct 22, 2018  9:15 AM CDT   (Arrive by 9:00 AM)   Return Visit with Bradnan Hanley MD   [facility] ([facility] [place])    [address] [facility]  [address] [place] [address] [phone]              Future tests that were ordered for you today     Open Future Orders        Priority Expected Expires Ordered    Comprehensive metabolic panel Routine 11/14/2018 5/14/2019 5/14/2018    Chromogranin A Routine 11/14/2018 5/14/2019 5/14/2018    *CBC with platelets differential Routine 11/14/2018 5/14/2019 5/14/2018    MRI Abdomen w & w/o contrast Routine 11/14/2018 12/12/2018 5/14/2018            Who to contact     If you have questions or need follow up information about today's clinic visit or your schedule please contact [facility] [place] directly at [phone].  Normal or non-critical lab and imaging results will be communicated to you by Lumianthart, letter or phone within 4 business days after the clinic has received the results. If you do not hear from us within 7 days, please contact the clinic through Lum[employer] or phone. If you have a critical or abnormal lab result, we will notify you by phone as soon as possible.  Submit refill requests through [employer] or call your pharmacy and they will forward the refill request to us. Please allow 3 business days for your refill to be completed.          Additional Information About Your Visit        LumiantharGobbler Information     [employer] lets you send messages to your doctor, view your test results, renew your prescriptions, schedule appointments and more. To sign up, go to [email or web address] . Click on \"Log in\" on the left side of the screen, which will take you to the Welcome page. Then click on \"Sign up Now\" on the right side of the page.     You will be asked to enter the access code listed below, as well as some personal information. Please follow the directions to create your username and password.     Your access code " "is: [email or web address]  Expires: 2018  6:30 AM     Your access code will  in 90 days. If you need help or a new code, please call your [facility] or [phone].        Care EveryWhere ID     This is your Care EveryWhere ID. This could be used by other organizations to access your [place] medical records  [id number]        Your Vitals Were     Pulse Temperature Respirations Height Pulse Oximetry BMI (Body Mass Index)    60 97  F (36.1  C) (Oral) 16 1.88 m (6' 2.02\") 99% 32.07 kg/m2       Blood Pressure from Last 3 Encounters:   18 120/72   18 126/83   09/15/17 120/84    Weight from Last 3 Encounters:   18 113.4 kg (249 lb 14.4 oz)   18 112.1 kg (247 lb 2.2 oz)   09/15/17 105.8 kg (233 lb 4.8 oz)               Primary Care Provider Office Phone # Fax #    Ken Piedra [phone]       [facility] [address] [facility] [address]        Equal Access to Services     DEE SHUKLA AH: Hadii isi weiro Soketty, wamerryda luqadaha, qaybta kaalmada adekaylynnyada, margo barker. So [facility] [phone].    ATENCIÓN: Si habla español, tiene a wong disposición servicios gratuitos de asistencia lingüística. Llame al [phone].    We comply with applicable federal civil rights laws and [place] laws. We do not discriminate on the basis of race, color, national origin, age, disability, sex, sexual orientation, or gender identity.            Thank you!     Thank you for choosing [facility]  for your care. Our goal is always to provide you with excellent care. Hearing back from our patients is one way we can continue to improve our services. Please take a few minutes to complete the written survey that you may receive in the mail after your visit with us. Thank you!             Your Updated Medication List - Protect others around you: Learn how to safely use, store and throw away your medicines at www.disposemymeds.org.    "       This list is accurate as of 5/14/18 11:59 PM.  Always use your most recent med list.                   Brand Name Dispense Instructions for use Diagnosis    albuterol 108 (90 Base) MCG/ACT Inhaler    PROAIR HFA/PROVENTIL HFA/VENTOLIN HFA     Inhale 1-2 puffs into the lungs every 6 hours as needed for shortness of breath / dyspnea or wheezing        amylase-lipase-protease 6000 units Cpep    CREON 6    450 capsule    Take 1-2 capsules (6,000-12,000 Units) by mouth 3 times daily (with meals)    Insulinoma       ASPIRIN PO      Take 81 mg by mouth        fluticasone 110 MCG/ACT Inhaler    FLOVENT HFA     Inhale 2 puffs into the lungs At Bedtime        MULTI-VITAMINS Tabs           OMEGA-3 FISH OIL PO      Take 1 g by mouth 2 times daily (with meals) 3 capsules        PROBIOTIC ADVANCED Caps      Take by mouth daily        vitamin B complex with vitamin C Tabs tablet      Take 1 tablet by mouth daily        VITAMIN C PO      Take by mouth 2 times daily        VITAMIN D-3 PO      Take by mouth daily Pt unsure of dosage

## 2018-08-29 DIAGNOSIS — D13.7 INSULINOMA: ICD-10-CM

## 2018-10-18 ENCOUNTER — RADIANT APPOINTMENT (OUTPATIENT)
Dept: MRI IMAGING | Facility: CLINIC | Age: 43
End: 2018-10-18
Attending: INTERNAL MEDICINE
Payer: COMMERCIAL

## 2018-10-18 DIAGNOSIS — D13.7 INSULINOMA: ICD-10-CM

## 2018-10-18 LAB
ALBUMIN SERPL-MCNC: 4 G/DL (ref 3.4–5)
ALP SERPL-CCNC: 34 U/L (ref 40–150)
ALT SERPL W P-5'-P-CCNC: 65 U/L (ref 0–70)
ANION GAP SERPL CALCULATED.3IONS-SCNC: 5 MMOL/L (ref 3–14)
AST SERPL W P-5'-P-CCNC: 44 U/L (ref 0–45)
BASOPHILS # BLD AUTO: 0 10E9/L (ref 0–0.2)
BASOPHILS NFR BLD AUTO: 0.6 %
BILIRUB SERPL-MCNC: 0.8 MG/DL (ref 0.2–1.3)
BUN SERPL-MCNC: 26 MG/DL (ref 7–30)
CALCIUM SERPL-MCNC: 8.8 MG/DL (ref 8.5–10.1)
CHLORIDE SERPL-SCNC: 100 MMOL/L (ref 94–109)
CO2 SERPL-SCNC: 29 MMOL/L (ref 20–32)
CREAT SERPL-MCNC: 0.95 MG/DL (ref 0.66–1.25)
DIFFERENTIAL METHOD BLD: NORMAL
EOSINOPHIL # BLD AUTO: 0.2 10E9/L (ref 0–0.7)
EOSINOPHIL NFR BLD AUTO: 4.7 %
ERYTHROCYTE [DISTWIDTH] IN BLOOD BY AUTOMATED COUNT: 12.4 % (ref 10–15)
GFR SERPL CREATININE-BSD FRML MDRD: 85 ML/MIN/1.7M2
GLUCOSE SERPL-MCNC: 100 MG/DL (ref 70–99)
HCT VFR BLD AUTO: 43.6 % (ref 40–53)
HGB BLD-MCNC: 14.2 G/DL (ref 13.3–17.7)
IMM GRANULOCYTES # BLD: 0 10E9/L (ref 0–0.4)
IMM GRANULOCYTES NFR BLD: 0.2 %
LYMPHOCYTES # BLD AUTO: 2 10E9/L (ref 0.8–5.3)
LYMPHOCYTES NFR BLD AUTO: 41.4 %
MCH RBC QN AUTO: 30.5 PG (ref 26.5–33)
MCHC RBC AUTO-ENTMCNC: 32.6 G/DL (ref 31.5–36.5)
MCV RBC AUTO: 94 FL (ref 78–100)
MONOCYTES # BLD AUTO: 0.6 10E9/L (ref 0–1.3)
MONOCYTES NFR BLD AUTO: 12.8 %
NEUTROPHILS # BLD AUTO: 2 10E9/L (ref 1.6–8.3)
NEUTROPHILS NFR BLD AUTO: 40.3 %
NRBC # BLD AUTO: 0 10*3/UL
NRBC BLD AUTO-RTO: 0 /100
PLATELET # BLD AUTO: 216 10E9/L (ref 150–450)
POTASSIUM SERPL-SCNC: 3.8 MMOL/L (ref 3.4–5.3)
PROT SERPL-MCNC: 7.5 G/DL (ref 6.8–8.8)
RBC # BLD AUTO: 4.65 10E12/L (ref 4.4–5.9)
SODIUM SERPL-SCNC: 134 MMOL/L (ref 133–144)
WBC # BLD AUTO: 4.9 10E9/L (ref 4–11)

## 2018-10-18 RX ORDER — GADOBUTROL 604.72 MG/ML
10 INJECTION INTRAVENOUS ONCE
Status: COMPLETED | OUTPATIENT
Start: 2018-10-18 | End: 2018-10-18

## 2018-10-18 RX ADMIN — GADOBUTROL 10 ML: 604.72 INJECTION INTRAVENOUS at 12:20

## 2018-10-18 NOTE — DISCHARGE INSTRUCTIONS
MRI Contrast Discharge Instructions    The IV contrast you received today will pass out of your body in your  urine. This will happen in the next 24 hours. You will not feel this process.  Your urine will not change color.    Drink at least 4 extra glasses of water or juice today (unless your doctor  has restricted your fluids). This reduces the stress on your kidneys.  You may take your regular medicines.    If you are on dialysis: It is best to have dialysis today.    If you have a reaction: Most reactions happen right away. If you have  any new symptoms after leaving the hospital (such as hives or swelling),  call your hospital at the correct number below. Or call your family doctor.  If you have breathing distress or wheezing, call 911.    Special instructions: ***    I have read and understand the above information.    Signature:______________________________________ Date:___________    Staff:__________________________________________ Date:___________     Time:__________    [place] [facility] Departments:    ___[place]: [phone]  ___[facility]: [phone]  ___[place]: [phone] ___[facility]: [phone]  ___[facility]: [phone]  ___[facility]: [phone]  ___[phone]  ___[facility]: [phone]  ___[phone]

## 2018-10-19 NOTE — PROGRESS NOTES
Oncology Follow-up Visit:  Oct 22, 2018    Diagnosis: [id number] pancreatic head insulinoma.  No evidence of metastatic disease.     Treatment to date: Whipple resection December 8, 2016, by Dr. Matthias Armenta. Zero of 10 lymph nodes involved, grade 2, negative resection margins, Ki-67 equals 5%.  preoperative serum chromogranin A = 70 on 12/05/2016.     REFERRING PHYSICIAN:  Dr. Matthias Armenta, Hepatobiliary Surgery, [facility].      Oncology History:  Over a 2-3 year period, beginning in 2014 or so, he was having hypoglycemic episodes of unexplained origin.  He was even hospitalized for one such episode in the fall of 2016.  He met with a primary care physician, and subsequently an endocrinologist.  There was concern for hypoglycemic cause and concern for potential insulinoma.  On 10/31/2016, MRI at [facility] revealed a 3.9 x 3.7 x 3.6 cm mass in the head of the pancreas.  There was involvement of the superior mesenteric vein, but no evidence of metastatic disease noted.  He met with Dr. Migel Sena at [facility], and a referral was made for surgical evaluation.      On 11/11/2016, a CT abdomen and pelvis was performed revealing an enhancing lesion with a cystic component versus central necrosis in the head and the uncinate process of the pancreas.  There was an indeterminate portacaval lymph node measuring 2.1 cm.  Otherwise, no evidence of suspicious adenopathy in the abdomen.  EUS was performed by Dr. Guru Canas at the [facility] on 11/18/2016.  He performed an FNA.  This revealed the tumor was composed of grade 1 neuroendocrine tumor with a low proliferation marker of Ki-67 less than 2%.  The tumor was positive for synaptophysin, chromogranin and CD56.      On 12/01/2016, the patient underwent a preoperative octreotide scan that did not show any evidence of metastatic disease or tumor.  There was no radiotracer uptake.  A baseline chromogranin A was 70.      His C peptide  at baseline was 0.7.  Insulin level at the preoperative baseline was 2.2 and 3.5.  Proinsulin levels were less than 7.5, and a subsequent level was 9.6.     On 12/05/2016, the patient met with Dr. Matthias Armenta, who took the patient to the operating room on 12/08/2016 to perform a Whipple.  The pancreaticoduodenectomy resulted in the revealing of a pancreatic neuroendocrine tumor at the head of the pancreas that measured 5.1 cm with intermediate grade G2.  Margins were negative by 0.1 cm.  Zero of 12 lymph nodes were involved.  The Ki-67 registered at 5% with 4 mitoses/10 high-power fields.  There was no perivascular, nor perineural invasion.  The pathologic stage was T2.  The pathologic N stage was N0, and M0 per the prior octreotide scan.  The patient is currently 9 weeks out from surgery.    2/6/17 - - medical oncology consultation, Dr. Hanley.  6/5/17 - - MRI abdomen: IMPRESSION:   1. Postsurgical of Whipple procedure evidence of locally recurrent  neuroendocrine tumor.  2. 1.2 cm ill-defined hepatic lesion in the central liver. This is  indeterminate and could represent sequela of recent episode of  inflammation or infection. However, it is new from prior and  metastases are not excluded. Consider short-term follow-up MRI.  3. Mildly increased size of pericaval lymph node and unchanged mildly  enlarged randy hepatis lymph node, indeterminate but favored to be  reactive.  6/9/17 - - oncology follow-up, Dr. Hanley.  9/12/17 - - endocrine consultation, Dr. Ortiz. Assessment of insulinoma: recommendation to follow Pro-insulin is a potential useful tumor marker for recurrence, to be followed by endocrinology, in context of glucose and C-peptide levels.  9/12/17 - - MRI abdomen: IMPRESSION: In this patient with history of pancreatic head  insulinoma, post Whipple resection there is no evidence for metastatic  disease within the abdomen. Previously described lesion in the central  liver near the IVC is not well  characterized in the present study.  9/15/17 - - oncology follow-up, Dr. Hanley.  1/16/18 - - MRI abdomen: IMPRESSION:   In this patient with history of insulinoma in the pancreatic head  status post Whipple procedure:  1. No evidence of metastatic disease within the abdomen.  2. Mild pancreatic duct ectasia measuring up to 6 mm. This is  unchanged to minimally increased when compared to the prior exam and  of uncertain clinical significance. No significant associated T1  abnormality to suggest pancreatitis.  1/19/18 - - oncology follow-up, Dr. Hanley.  5/10/18 - - MRI abdomen IMPRESSION: In this patient with history of neuroendocrine tumor of  the pancreas and subsequent Whipple procedure:  1. Slightly increased size of a precaval lymph node, currently  measuring 1.5 x 2.0 cm comparison measured 1.3 x 1.6 cm on 1/16/2018  exam. This is indeterminate  2. Mild persistent dilatation of the main pancreatic duct measuring 6mm.    5/14/18 - - oncology follow-up, Dr. Hanley.  6/12/18 - - patient was no-show for endocrinology visit with Dr. Ortiz.  10/18/18 - - MRI abdomen: IMPRESSION:   1. In this patient with history of pancreatic insulinoma status post  Whipple procedure, there is no evidence of locally recurrent or  metastatic disease.  2. Stable prominent portacaval lymph node.  10/22/18 - - oncology follow-up, Dr. Hanley.        Interim History/History Of Present Illness:  Mr. Joey Guerrero is a 42 y/o  man with pancreatic head insulinoma resected by Dr. Armenta on December 8, 2016.      He is here today with his wife.  He has denied any symptoms that are characteristic of high insulin like sequelae that he had with insulinoma upon presentation.  He missed his June followup appointment with Dr. Ortiz from Endocrinology to follow up on the insulin-related issues.  He has generally been doing well.  He had an MRI abdomen last week on 10/18 showing no evidence of recurrent malignancy.  His chromogranin A remains  unremarkable.  He is generally feeling well.  He denies any dizziness, blurred vision or any other hypoglycemic unawareness or any other issues related to endocrine-related tumor.               Review Of Systems:  Full 12-point ROS reviewed. Pertinent symptoms reviewed above per HPI.    Past medical, social, surgical, and family histories reviewed.     PAST MEDICAL HISTORY:  Pancreatic neuroendocrine tumor, pathologic T2 N0 M0 per above, status post Whipple surgery on 12/08/2016.  Other surgeries notable include an appendectomy in 2007.      FAMILY HISTORY:  Denies any family history of malignancy.      SOCIAL HISTORY:  The patient lives in [place], [place], with his wife.  They have 2 boys, ages 8 and 4.  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    Tobacco:  Used to chew and smoke occasional cigarettes since the age of 18 until recently.    Alcohol:  Denies alcohol abuse.    Drugs:  Denies illicit drug use.     Allergies:  Allergies as of 10/22/2018 - Thaddeus as Reviewed 05/14/2018   Allergen Reaction Noted     Pollen extract  12/05/2016     Cat hair extract  12/05/2016       Current Medications:  Current Outpatient Prescriptions   Medication Sig Dispense Refill     albuterol (PROAIR HFA, PROVENTIL HFA, VENTOLIN HFA) 108 (90 BASE) MCG/ACT inhaler Inhale 1-2 puffs into the lungs every 6 hours as needed for shortness of breath / dyspnea or wheezing       amylase-lipase-protease (CREON 6) 6000 units CPEP Take 1-2 capsules (6,000-12,000 Units) by mouth 3 times daily (with meals) 450 capsule 3     Ascorbic Acid (VITAMIN C PO) Take by mouth 2 times daily        ASPIRIN PO Take 81 mg by mouth       Cholecalciferol (VITAMIN D-3 PO) Take by mouth daily Pt unsure of dosage       fluticasone (FLOVENT HFA) 110 MCG/ACT inhaler Inhale 2 puffs into the lungs At Bedtime        Multiple Vitamin (MULTI-VITAMINS) TABS        Omega-3 Fatty Acids (OMEGA-3 FISH OIL PO) Take 1 g by mouth 2 times daily (with meals) 3  "capsules       Probiotic Product (PROBIOTIC ADVANCED) CAPS Take by mouth daily       vitamin B complex with vitamin C (VITAMIN  B COMPLEX) TABS tablet Take 1 tablet by mouth daily          Physical Exam:  /78  Pulse 68  Temp 97.9  F (36.6  C) (Tympanic)  Resp 16  Ht 1.88 m (6' 2\")  Wt 119.9 kg (264 lb 4 oz)  SpO2 97%  BMI 33.93 kg/m2       GENERAL:  Very pleasant, robust,  gentleman in no acute distress, alert and oriented x3  HEENT:  Anicteric sclerae.  Oropharynx is clear without mucositis or thrush.   CARDIOVASCULAR:  Regular rate and rhythm.  Normal S1, S2.  No murmurs, gallops or rubs.   LUNGS:  Clear to auscultation bilaterally.   ABDOMEN:  Soft, non tender and non distended.  The midline surgical scar has well-healed.    EXTREMITIES:  No edema noted.       Laboratory/Imaging Studies.    Results for MELISSA YESENIA JOEL (MRN [id number]) as of 10/19/2018 14:28   Ref. Range 6/5/2017 10:51 9/12/2017 15:39 1/16/2018 11:48 5/10/2018 10:39   Chromogranin A Latest Ref Range: 0 - 95 ng/mL 53 62 77 53     Results for orders placed or performed in visit on 10/18/18   *CBC with platelets differential   Result Value Ref Range    WBC 4.9 4.0 - 11.0 10e9/L    RBC Count 4.65 4.4 - 5.9 10e12/L    Hemoglobin 14.2 13.3 - 17.7 g/dL    Hematocrit 43.6 40.0 - 53.0 %    MCV 94 78 - 100 fl    MCH 30.5 26.5 - 33.0 pg    MCHC 32.6 31.5 - 36.5 g/dL    RDW 12.4 10.0 - 15.0 %    Platelet Count 216 150 - 450 10e9/L    Diff Method Automated Method     % Neutrophils 40.3 %    % Lymphocytes 41.4 %    % Monocytes 12.8 %    % Eosinophils 4.7 %    % Basophils 0.6 %    % Immature Granulocytes 0.2 %    Nucleated RBCs 0 0 /100    Absolute Neutrophil 2.0 1.6 - 8.3 10e9/L    Absolute Lymphocytes 2.0 0.8 - 5.3 10e9/L    Absolute Monocytes 0.6 0.0 - 1.3 10e9/L    Absolute Eosinophils 0.2 0.0 - 0.7 10e9/L    Absolute Basophils 0.0 0.0 - 0.2 10e9/L    Abs Immature Granulocytes 0.0 0 - 0.4 10e9/L    Absolute Nucleated RBC 0.0  "   Comprehensive metabolic panel   Result Value Ref Range    Sodium 134 133 - 144 mmol/L    Potassium 3.8 3.4 - 5.3 mmol/L    Chloride 100 94 - 109 mmol/L    Carbon Dioxide 29 20 - 32 mmol/L    Anion Gap 5 3 - 14 mmol/L    Glucose 100 (H) 70 - 99 mg/dL    Urea Nitrogen 26 7 - 30 mg/dL    Creatinine 0.95 0.66 - 1.25 mg/dL    GFR Estimate 85 >60 mL/min/1.7m2    GFR Estimate If Black >90 >60 mL/min/1.7m2    Calcium 8.8 8.5 - 10.1 mg/dL    Bilirubin Total 0.8 0.2 - 1.3 mg/dL    Albumin 4.0 3.4 - 5.0 g/dL    Protein Total 7.5 6.8 - 8.8 g/dL    Alkaline Phosphatase 34 (L) 40 - 150 U/L    ALT 65 0 - 70 U/L    AST 44 0 - 45 U/L   Chromogranin A   Result Value Ref Range    Chromogranin A 55 0 - 95 ng/mL       RADIOLOGY: Prior to the visit, I personally reviewed the images from the 10/18/2018 MRI of the abdomen and provided a printed copy of the report to both him and his wife.  I reviewed it in full today during the course of the visit.       ASSESSMENT/PLAN:  42 y/o man with pancreatic head insulinoma, who presented with hypoglycemic episodes for several years prior to diagnosis. He had Whipple surgery by Dr. Matthias Armenta on 12/08/2016 ([id number]).     He is approximately 22 months out from his definitive surgery performed by Dr. Matthias Armenta for his pancreatic head insulinoma.  He has relatively low risk with relatively low Ki-67 index.  He had negative margins.  It was grade 2 at the time with negative lymph node involvement.  Chromogranin A markers have been unremarkable.  He has had no recurrent symptoms of insulinoma that he had prior to diagnosis that he has had long-term.  I encouraged him to follow up with Dr. Ortiz from an Endocrinology checkup standpoint.  Otherwise, at this point being nearly 2 years out from surgery, I think a 6-month followup would be appropriate.  I suggested an MRI abdomen, as well as CBC, CMP and chromogranin A to be checked in 6 months' time.  He provided verbal consent today.  We will help  to schedule this and also help reschedule his appointment with Dr. Ortiz.         I spent 30 minutes in consultation, including H&P and Discussion. >50% of time was spent in counseling and in coordination of care.    Brandan Hanley MD, PhD

## 2018-10-21 LAB — CGA SERPL-MCNC: 55 NG/ML (ref 0–95)

## 2018-10-22 ENCOUNTER — ONCOLOGY VISIT (OUTPATIENT)
Dept: ONCOLOGY | Facility: CLINIC | Age: 43
End: 2018-10-22
Attending: INTERNAL MEDICINE
Payer: COMMERCIAL

## 2018-10-22 ENCOUNTER — TELEPHONE (OUTPATIENT)
Dept: ENDOCRINOLOGY | Facility: CLINIC | Age: 43
End: 2018-10-22

## 2018-10-22 VITALS
HEART RATE: 68 BPM | DIASTOLIC BLOOD PRESSURE: 78 MMHG | RESPIRATION RATE: 16 BRPM | WEIGHT: 264.25 LBS | HEIGHT: 74 IN | SYSTOLIC BLOOD PRESSURE: 124 MMHG | BODY MASS INDEX: 33.91 KG/M2 | TEMPERATURE: 97.9 F | OXYGEN SATURATION: 97 %

## 2018-10-22 DIAGNOSIS — D13.7 INSULINOMA: Primary | ICD-10-CM

## 2018-10-22 PROCEDURE — 99214 OFFICE O/P EST MOD 30 MIN: CPT | Mod: ZP | Performed by: INTERNAL MEDICINE

## 2018-10-22 PROCEDURE — G0463 HOSPITAL OUTPT CLINIC VISIT: HCPCS | Mod: ZF

## 2018-10-22 ASSESSMENT — PAIN SCALES - GENERAL: PAINLEVEL: NO PAIN (0)

## 2018-10-22 NOTE — LETTER
10/22/2018       RE: Joey Guerrero  [address]  [facility] [address]     Dear Colleague,    Thank you for referring your patient, Joey Guerrero, to the [facility]. Please see a copy of my visit note below.    Oncology Follow-up Visit:  Oct 22, 2018    Diagnosis: [id number] pancreatic head insulinoma.  No evidence of metastatic disease.     Treatment to date: Whipple resection December 8, 2016, by Dr. Matthias Armenta. Zero of 10 lymph nodes involved, grade 2, negative resection margins, Ki-67 equals 5%.  preoperative serum chromogranin A = 70 on 12/05/2016.     REFERRING PHYSICIAN:  Dr. Matthias Armenta, Hepatobiliary Surgery, [facility].      Oncology History:  Over a 2-3 year period, beginning in 2014 or so, he was having hypoglycemic episodes of unexplained origin.  He was even hospitalized for one such episode in the fall of 2016.  He met with a primary care physician, and subsequently an endocrinologist.  There was concern for hypoglycemic cause and concern for potential insulinoma.  On 10/31/2016, MRI at [facility] revealed a 3.9 x 3.7 x 3.6 cm mass in the head of the pancreas.  There was involvement of the superior mesenteric vein, but no evidence of metastatic disease noted.  He met with Dr. Migel Sena at [facility], and a referral was made for surgical evaluation.      On 11/11/2016, a CT abdomen and pelvis was performed revealing an enhancing lesion with a cystic component versus central necrosis in the head and the uncinate process of the pancreas.  There was an indeterminate portacaval lymph node measuring 2.1 cm.  Otherwise, no evidence of suspicious adenopathy in the abdomen.  EUS was performed by Dr. Guru Canas at the [facility] on 11/18/2016.  He performed an FNA.  This revealed the tumor was composed of grade 1 neuroendocrine tumor with a low proliferation marker of Ki-67 less than 2%.  The tumor was positive for synaptophysin, chromogranin and  CD56.      On 12/01/2016, the patient underwent a preoperative octreotide scan that did not show any evidence of metastatic disease or tumor.  There was no radiotracer uptake.  A baseline chromogranin A was 70.      His C peptide at baseline was 0.7.  Insulin level at the preoperative baseline was 2.2 and 3.5.  Proinsulin levels were less than 7.5, and a subsequent level was 9.6.     On 12/05/2016, the patient met with Dr. Matthias Armenta, who took the patient to the operating room on 12/08/2016 to perform a Whipple.  The pancreaticoduodenectomy resulted in the revealing of a pancreatic neuroendocrine tumor at the head of the pancreas that measured 5.1 cm with intermediate grade G2.  Margins were negative by 0.1 cm.  Zero of 12 lymph nodes were involved.  The Ki-67 registered at 5% with 4 mitoses/10 high-power fields.  There was no perivascular, nor perineural invasion.  The pathologic stage was T2.  The pathologic N stage was N0, and M0 per the prior octreotide scan.  The patient is currently 9 weeks out from surgery.    2/6/17 - - medical oncology consultation, Dr. Hanley.  6/5/17 - - MRI abdomen: IMPRESSION:   1. Postsurgical of Whipple procedure evidence of locally recurrent  neuroendocrine tumor.  2. 1.2 cm ill-defined hepatic lesion in the central liver. This is  indeterminate and could represent sequela of recent episode of  inflammation or infection. However, it is new from prior and  metastases are not excluded. Consider short-term follow-up MRI.  3. Mildly increased size of pericaval lymph node and unchanged mildly  enlarged randy hepatis lymph node, indeterminate but favored to be  reactive.  6/9/17 - - oncology follow-up, Dr. Hanley.  9/12/17 - - endocrine consultation, Dr. Ortiz. Assessment of insulinoma: recommendation to follow Pro-insulin is a potential useful tumor marker for recurrence, to be followed by endocrinology, in context of glucose and C-peptide levels.  9/12/17 - - MRI abdomen: IMPRESSION: In  this patient with history of pancreatic head  insulinoma, post Whipple resection there is no evidence for metastatic  disease within the abdomen. Previously described lesion in the central  liver near the IVC is not well characterized in the present study.  9/15/17 - - oncology follow-up, Dr. Hanley.  1/16/18 - - MRI abdomen: IMPRESSION:   In this patient with history of insulinoma in the pancreatic head  status post Whipple procedure:  1. No evidence of metastatic disease within the abdomen.  2. Mild pancreatic duct ectasia measuring up to 6 mm. This is  unchanged to minimally increased when compared to the prior exam and  of uncertain clinical significance. No significant associated T1  abnormality to suggest pancreatitis.  1/19/18 - - oncology follow-up, Dr. Hanley.  5/10/18 - - MRI abdomen IMPRESSION: In this patient with history of neuroendocrine tumor of  the pancreas and subsequent Whipple procedure:  1. Slightly increased size of a precaval lymph node, currently  measuring 1.5 x 2.0 cm comparison measured 1.3 x 1.6 cm on 1/16/2018  exam. This is indeterminate  2. Mild persistent dilatation of the main pancreatic duct measuring 6mm.    5/14/18 - - oncology follow-up, Dr. Hanley.  6/12/18 - - patient was no-show for endocrinology visit with Dr. Ortiz.  10/18/18 - - MRI abdomen: IMPRESSION:   1. In this patient with history of pancreatic insulinoma status post  Whipple procedure, there is no evidence of locally recurrent or  metastatic disease.  2. Stable prominent portacaval lymph node.  10/22/18 - - oncology follow-up, Dr. Hanley.        Interim History/History Of Present Illness:  Mr. Joey Guerrero is a 42 y/o  man with pancreatic head insulinoma resected by Dr. Armenta on December 8, 2016.      He is here today with his wife.  He has denied any symptoms that are characteristic of high insulin like sequelae that he had with insulinoma upon presentation.  He missed his June followup appointment with   Angel from Endocrinology to follow up on the insulin-related issues.  He has generally been doing well.  He had an MRI abdomen last week on 10/18 showing no evidence of recurrent malignancy.  His chromogranin A remains unremarkable.  He is generally feeling well.  He denies any dizziness, blurred vision or any other hypoglycemic unawareness or any other issues related to endocrine-related tumor.               Review Of Systems:  Full 12-point ROS reviewed. Pertinent symptoms reviewed above per HPI.    Past medical, social, surgical, and family histories reviewed.     PAST MEDICAL HISTORY:  Pancreatic neuroendocrine tumor, pathologic T2 N0 M0 per above, status post Whipple surgery on 12/08/2016.  Other surgeries notable include an appendectomy in 2007.      FAMILY HISTORY:  Denies any family history of malignancy.      SOCIAL HISTORY:  The patient lives in [place], [place], with his wife.  They have 2 boys, ages 8 and 4.  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    Tobacco:  Used to chew and smoke occasional cigarettes since the age of 18 until recently.    Alcohol:  Denies alcohol abuse.    Drugs:  Denies illicit drug use.     Allergies:  Allergies as of 10/22/2018 - Thaddeus as Reviewed 05/14/2018   Allergen Reaction Noted     Pollen extract  12/05/2016     Cat hair extract  12/05/2016       Current Medications:  Current Outpatient Prescriptions   Medication Sig Dispense Refill     albuterol (PROAIR HFA, PROVENTIL HFA, VENTOLIN HFA) 108 (90 BASE) MCG/ACT inhaler Inhale 1-2 puffs into the lungs every 6 hours as needed for shortness of breath / dyspnea or wheezing       amylase-lipase-protease (CREON 6) 6000 units CPEP Take 1-2 capsules (6,000-12,000 Units) by mouth 3 times daily (with meals) 450 capsule 3     Ascorbic Acid (VITAMIN C PO) Take by mouth 2 times daily        ASPIRIN PO Take 81 mg by mouth       Cholecalciferol (VITAMIN D-3 PO) Take by mouth daily Pt unsure of dosage        "fluticasone (FLOVENT HFA) 110 MCG/ACT inhaler Inhale 2 puffs into the lungs At Bedtime        Multiple Vitamin (MULTI-VITAMINS) TABS        Omega-3 Fatty Acids (OMEGA-3 FISH OIL PO) Take 1 g by mouth 2 times daily (with meals) 3 capsules       Probiotic Product (PROBIOTIC ADVANCED) CAPS Take by mouth daily       vitamin B complex with vitamin C (VITAMIN  B COMPLEX) TABS tablet Take 1 tablet by mouth daily          Physical Exam:  /78  Pulse 68  Temp 97.9  F (36.6  C) (Tympanic)  Resp 16  Ht 1.88 m (6' 2\")  Wt 119.9 kg (264 lb 4 oz)  SpO2 97%  BMI 33.93 kg/m2       GENERAL:  Very pleasant, robust,  gentleman in no acute distress, alert and oriented x3  HEENT:  Anicteric sclerae.  Oropharynx is clear without mucositis or thrush.   CARDIOVASCULAR:  Regular rate and rhythm.  Normal S1, S2.  No murmurs, gallops or rubs.   LUNGS:  Clear to auscultation bilaterally.   ABDOMEN:  Soft, non tender and non distended.  The midline surgical scar has well-healed.    EXTREMITIES:  No edema noted.       Laboratory/Imaging Studies.    Results for YESENIA TORRES (MRN [id number]) as of 10/19/2018 14:28   Ref. Range 6/5/2017 10:51 9/12/2017 15:39 1/16/2018 11:48 5/10/2018 10:39   Chromogranin A Latest Ref Range: 0 - 95 ng/mL 53 62 77 53     Results for orders placed or performed in visit on 10/18/18   *CBC with platelets differential   Result Value Ref Range    WBC 4.9 4.0 - 11.0 10e9/L    RBC Count 4.65 4.4 - 5.9 10e12/L    Hemoglobin 14.2 13.3 - 17.7 g/dL    Hematocrit 43.6 40.0 - 53.0 %    MCV 94 78 - 100 fl    MCH 30.5 26.5 - 33.0 pg    MCHC 32.6 31.5 - 36.5 g/dL    RDW 12.4 10.0 - 15.0 %    Platelet Count 216 150 - 450 10e9/L    Diff Method Automated Method     % Neutrophils 40.3 %    % Lymphocytes 41.4 %    % Monocytes 12.8 %    % Eosinophils 4.7 %    % Basophils 0.6 %    % Immature Granulocytes 0.2 %    Nucleated RBCs 0 0 /100    Absolute Neutrophil 2.0 1.6 - 8.3 10e9/L    Absolute Lymphocytes 2.0 0.8 " - 5.3 10e9/L    Absolute Monocytes 0.6 0.0 - 1.3 10e9/L    Absolute Eosinophils 0.2 0.0 - 0.7 10e9/L    Absolute Basophils 0.0 0.0 - 0.2 10e9/L    Abs Immature Granulocytes 0.0 0 - 0.4 10e9/L    Absolute Nucleated RBC 0.0    Comprehensive metabolic panel   Result Value Ref Range    Sodium 134 133 - 144 mmol/L    Potassium 3.8 3.4 - 5.3 mmol/L    Chloride 100 94 - 109 mmol/L    Carbon Dioxide 29 20 - 32 mmol/L    Anion Gap 5 3 - 14 mmol/L    Glucose 100 (H) 70 - 99 mg/dL    Urea Nitrogen 26 7 - 30 mg/dL    Creatinine 0.95 0.66 - 1.25 mg/dL    GFR Estimate 85 >60 mL/min/1.7m2    GFR Estimate If Black >90 >60 mL/min/1.7m2    Calcium 8.8 8.5 - 10.1 mg/dL    Bilirubin Total 0.8 0.2 - 1.3 mg/dL    Albumin 4.0 3.4 - 5.0 g/dL    Protein Total 7.5 6.8 - 8.8 g/dL    Alkaline Phosphatase 34 (L) 40 - 150 U/L    ALT 65 0 - 70 U/L    AST 44 0 - 45 U/L   Chromogranin A   Result Value Ref Range    Chromogranin A 55 0 - 95 ng/mL       RADIOLOGY: Prior to the visit, I personally reviewed the images from the 10/18/2018 MRI of the abdomen and provided a printed copy of the report to both him and his wife.  I reviewed it in full today during the course of the visit.       ASSESSMENT/PLAN:  44 y/o man with pancreatic head insulinoma, who presented with hypoglycemic episodes for several years prior to diagnosis. He had Whipple surgery by Dr. Matthias Armenta on 12/08/2016 (pT2NM0).     He is approximately 22 months out from his definitive surgery performed by Dr. Matthias Armenta for his pancreatic head insulinoma.  He has relatively low risk with relatively low Ki-67 index.  He had negative margins.  It was grade 2 at the time with negative lymph node involvement.  Chromogranin A markers have been unremarkable.  He has had no recurrent symptoms of insulinoma that he had prior to diagnosis that he has had long-term.  I encouraged him to follow up with Dr. Ortiz from an Endocrinology checkup standpoint.  Otherwise, at this point being nearly 2  years out from surgery, I think a 6-month followup would be appropriate.  I suggested an MRI abdomen, as well as CBC, CMP and chromogranin A to be checked in 6 months' time.  He provided verbal consent today.  We will help to schedule this and also help reschedule his appointment with Dr. Ortiz.         I spent 30 minutes in consultation, including H&P and Discussion. >50% of time was spent in counseling and in coordination of care.    Brandan Hanley MD, PhD

## 2018-10-22 NOTE — TELEPHONE ENCOUNTER
Joey is scheduled for f/u  1/2019 asking if labs are needed before to be done by him locally. Last seen  9/2017 with a cancel and no show  6/2018.. Do you want to see him before ordering anything due to  Appointment compliance issues?

## 2018-10-22 NOTE — NURSING NOTE
"Oncology Rooming Note    October 22, 2018 9:21 AM   Joey Guerrero is a 43 year old male who presents for:    Chief Complaint   Patient presents with     Oncology Clinic Visit     Return: Neuroendrocrine Tumor     Initial Vitals: /78  Pulse 68  Temp 97.9  F (36.6  C) (Tympanic)  Resp 16  Ht 1.88 m (6' 2\")  Wt 119.9 kg (264 lb 4 oz)  SpO2 97%  BMI 33.93 kg/m2 Estimated body mass index is 33.93 kg/(m^2) as calculated from the following:    Height as of this encounter: 1.88 m (6' 2\").    Weight as of this encounter: 119.9 kg (264 lb 4 oz). Body surface area is 2.5 meters squared.  No Pain (0) Comment: Data Unavailable   No LMP for male patient.  Allergies reviewed: Yes  Medications reviewed: Yes    Medications: Medication refills not needed today.  Pharmacy name entered into [facility]:    [facility] [address] [place], [place] - [address] [facility] [address] [place] [address] [id number] AT [place] [address]  [address]    Clinical concerns: no new concerns today  Dr. Hanley\ was notified.    10 minutes for nursing intake (face to face time)     Oneil Zamora CMA              "

## 2018-10-22 NOTE — TELEPHONE ENCOUNTER
[facility] Call Center    Phone Message    May a detailed message be left on voicemail: lana    Reason for Call: Other: PT is wondering if he will need labs before his next appt. with Dr. Ortiz.  He would prefer to get them done out where he is if needed. Please follow up with the PT on his cell.     Action Taken: Message routed to:  [facility] ([facility]): migel

## 2018-10-22 NOTE — MR AVS SNAPSHOT
After Visit Summary   10/22/2018    Joey Guerrero    MRN: [id number]           Patient Information     Date Of Birth          1975        Visit Information        Provider Department      10/22/2018 9:15 AM Brandan Hanley MD [facility]        Today's Diagnoses     Insulinoma    -  1       Follow-ups after your visit        Follow-up notes from your care team     Return in about 6 months (around 4/22/2019).      Your next 10 appointments already scheduled     Jan 28, 2019  4:00 PM CST   (Arrive by 3:45 PM)   RETURN ENDOCRINE with Eva Ortiz MD   [facility] ([facility])    [address]  3rd Floor  [facility] [address]   [phone]            Apr 18, 2019 12:00 PM CDT   MR ABDOMEN W/O & W CONTRAST with [address] MRI ([facility])    [address]  [address] [facility] [address] [phone]           How do I prepare for my exam? (Food and drink instructions) Do not eat or drink for 6 hours prior to exam. **If you will be receiving sedation or general anesthesia, please see special notes below.**  How do I prepare for my exam? (Other instructions) Take your medicines as usual, unless your doctor tells you not to. You may or may not receive IV contrast for this exam pending the discretion of the Radiologist.  **If you will be receiving sedation or general anesthesia, please see special notes below.**  What should I wear: The MRI machine uses a strong magnet. Please wear clothes without metal (snaps, zippers). A sweatsuit works well, or we may give you a hospital gown. Please remove any body piercings and hair extensions before you arrive. You will also remove watches, jewelry, hairpins, wallets, dentures, partial dental plates and hearing aids. You may wear contact lenses, and you may be able to wear your rings. We have a safe place to keep your personal items, but it  is safer to leave them at home.  How long does the exam take: Most tests take 30 to 60 minutes.  HOWEVER, IF YOUR DOCTOR PRESCRIBES ANESTHESIA please plan on spending four to five hours in the recovery room.  What should I bring: Bring a list of your current medicines to your exam (including vitamins, minerals and over-the-counter drugs). Also bring the results of similar scans you may have had.  Do I need a : **If you will be receiving sedation or general anesthesia, please see special notes below.**  What should I do after the exam: No Restrictions, You may resume normal activities.  What is this test: MRI (magnetic resonance imaging) uses a strong magnet and radio waves to look inside the body. An MRA (magnetic resonance angiogram) does the same thing, but it lets us look at your blood vessels. A computer turns the radio waves into pictures showing cross sections of the body, much like slices of bread. This helps us see any problems more clearly. You may receive fluid (called  contrast ) before or during your scan. The fluid helps us see the pictures better. We give the fluid through an IV (small needle in your arm).  Who should I call with questions: If you have any questions, please contact your Imaging Department exam site. Directions, parking instructions, and other information is available on our website, [email or web address].            Apr 18, 2019 12:45 PM CDT   LAB with  LAB   [facility] Lab ([facility])    [address] [address] [phone]           Please do not eat 10-12 hours before your appointment if you are coming in fasting for labs on lipids, cholesterol, or glucose (sugar). This does not apply to pregnant women. Water, hot tea and black coffee (with nothing added) are okay. Do not drink other fluids, diet soda or chew gum.            Apr 22, 2019  8:45 AM CDT   (Arrive by 8:30 AM)   Return Visit with Brandan Hanley MD   [facility]  "[facility] ([facility])    [address]  [address]  [facility] [id number] [phone]              Future tests that were ordered for you today     Open Future Orders        Priority Expected Expires Ordered    MRI Abdomen w & w/o contrast Routine 2019 2019 10/22/2018    Comprehensive metabolic panel Routine 2019 2019 10/22/2018    Chromogranin A Routine 2019 2019 10/22/2018            Who to contact     If you have questions or need follow up information about today's clinic visit or your schedule please contact [facility] directly at [phone].  Normal or non-critical lab and imaging results will be communicated to you by Verdiemhart, letter or phone within 4 business days after the clinic has received the results. If you do not hear from us within 7 days, please contact the clinic through Verdiemhart or phone. If you have a critical or abnormal lab result, we will notify you by phone as soon as possible.  Submit refill requests through Ohanae or call your pharmacy and they will forward the refill request to us. Please allow 3 business days for your refill to be completed.          Additional Information About Your Visit        Ohanae Information     Ohanae lets you send messages to your doctor, view your test results, renew your prescriptions, schedule appointments and more. To sign up, go to [email or web address] . Click on \"Log in\" on the left side of the screen, which will take you to the Welcome page. Then click on \"Sign up Now\" on the right side of the page.     You will be asked to enter the access code listed below, as well as some personal information. Please follow the directions to create your username and password.     Your access code is: [email or web address]  Expires: 2019  6:31 AM     Your access code will  in 90 days. If you need help or a new code, please call your [place] clinic or [phone].   " "     Care EveryWhere ID     This is your Care EveryWhere ID. This could be used by other organizations to access your [place] medical records  [id number]        Your Vitals Were     Pulse Temperature Respirations Height Pulse Oximetry BMI (Body Mass Index)    68 97.9  F (36.6  C) (Tympanic) 16 1.88 m (6' 2\") 97% 33.93 kg/m2       Blood Pressure from Last 3 Encounters:   10/22/18 124/78   05/14/18 120/72   01/19/18 126/83    Weight from Last 3 Encounters:   10/22/18 119.9 kg (264 lb 4 oz)   05/14/18 113.4 kg (249 lb 14.4 oz)   01/19/18 112.1 kg (247 lb 2.2 oz)               Primary Care Provider Office Phone # Fax #    Ken Piedra [phone]       [place] [address] [address] [facility] [address]        Equal Access to Services     [facility]: Hadii isi alonzo hadasho Soomaali, waaxda luqadaha, qaybta kaalmada adeegyada, margo donohue . So [facility] [phone].    ATENCIÓN: Si reg galicia, tiene a wong disposición servicios gratuitos de asistencia lingüística. [facility] [phone].    We comply with applicable federal civil rights laws and [place] laws. We do not discriminate on the basis of race, color, national origin, age, disability, sex, sexual orientation, or gender identity.            Thank you!     Thank you for choosing [facility]  for your care. Our goal is always to provide you with excellent care. Hearing back from our patients is one way we can continue to improve our services. Please take a few minutes to complete the written survey that you may receive in the mail after your visit with us. Thank you!             Your Updated Medication List - Protect others around you: Learn how to safely use, store and throw away your medicines at www.disposemymeds.org.          This list is accurate as of 10/22/18 10:11 AM.  Always use your most recent med list.                   Brand Name Dispense Instructions for use Diagnosis    albuterol " 108 (90 Base) MCG/ACT inhaler    PROAIR HFA/PROVENTIL HFA/VENTOLIN HFA     Inhale 1-2 puffs into the lungs every 6 hours as needed for shortness of breath / dyspnea or wheezing        amylase-lipase-protease 6000 units Cpep    CREON 6    450 capsule    Take 1-2 capsules (6,000-12,000 Units) by mouth 3 times daily (with meals)    Insulinoma       ASPIRIN PO      Take 81 mg by mouth        fluticasone 110 MCG/ACT Inhaler    FLOVENT HFA     Inhale 2 puffs into the lungs At Bedtime        MULTI-VITAMINS Tabs           OMEGA-3 FISH OIL PO      Take 1 g by mouth 2 times daily (with meals) 3 capsules        PROBIOTIC ADVANCED Caps      Take by mouth daily        vitamin B complex with vitamin C Tabs tablet      Take 1 tablet by mouth daily        VITAMIN C PO      Take by mouth 2 times daily        VITAMIN D-3 PO      Take by mouth daily Pt unsure of dosage

## 2018-10-23 NOTE — TELEPHONE ENCOUNTER
I left a message for Joey that we will see him in clinic first by Dr Ortiz then decide on lab work. It has been awhile see we have  seen him so need assessment  first.

## 2019-01-28 ENCOUNTER — OFFICE VISIT (OUTPATIENT)
Dept: ENDOCRINOLOGY | Facility: CLINIC | Age: 44
End: 2019-01-28
Payer: COMMERCIAL

## 2019-01-28 VITALS
HEART RATE: 76 BPM | HEIGHT: 74 IN | DIASTOLIC BLOOD PRESSURE: 78 MMHG | SYSTOLIC BLOOD PRESSURE: 114 MMHG | WEIGHT: 268.01 LBS | BODY MASS INDEX: 34.4 KG/M2

## 2019-01-28 DIAGNOSIS — R73.03 PRE-DIABETES: ICD-10-CM

## 2019-01-28 DIAGNOSIS — Z98.890 HISTORY OF PANCREATIC SURGERY: ICD-10-CM

## 2019-01-28 DIAGNOSIS — D13.7 INSULINOMA: ICD-10-CM

## 2019-01-28 DIAGNOSIS — D13.7 INSULINOMA: Primary | ICD-10-CM

## 2019-01-28 LAB
GLUCOSE SERPL-MCNC: 96 MG/DL (ref 70–99)
HBA1C MFR BLD: 6.2 % (ref 0–5.6)

## 2019-01-28 ASSESSMENT — MIFFLIN-ST. JEOR: SCORE: 2170.69

## 2019-01-28 ASSESSMENT — PAIN SCALES - GENERAL: PAINLEVEL: NO PAIN (0)

## 2019-01-28 NOTE — PROGRESS NOTES
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 Tumor was 5.1 cm, not pathologically  malignant (as defined by distant mets, vascular or capsular invasion).  This does not fully exclude malignancy.   pT2, pNo, pMx, stage IB assuming no distant mets.   There were no elevated  markers measured prior to surgery , which will help us detect recurrence, other than the hypoglycemia/ hyperinsulinemia The primary tumor uptake of octreotide was not zero but it appeared quite low on the octreoscan. The proinsulin seemed to be the same with fasting and non-fasting.     He no longer has symptoms suggestive of hypoglycemia.  I have noted that think that the proinsulin might be a useful tumor marker for him.  We will follow this (always in the context of glucose and c peptide)    S/p Whipple procedure -   on treatment of pancreatic exocrine replacement. This may be a risk for future diabetes development, depending on the volume of pancreatic tissue that was actually removed.      Pre-diabetes in the past.  Repeat A1c in the lab.  Results following the appt show A1c 6.2% in the laboratory (which is higher than the POC test done in the office, which doesn't perform as well at the lower levels).      Obesity by BMI.  His weight is up from jennifer 230 9/17 but still lower than 283 prior to surgery for # 1.    Lifestyle to maintain weight or to lose a small amount would be ideal given the pre-diabetes.      Hilda Ortiz MD      Cc/HISTORY OF PRESENT ILLNESS  43- year old man presents, along with 2 sons,  for follow up of history of insulinoma. See my 5/10/17 note for description of the presentation and diagnostic tests     His treatment course has been as follows:  11/18/16 FNA -EUS pancreatic head mass was positive for malignancy - neuroendocrine tumor.    On 12/8/16 he was treated with open nonpylorus preserving  Whipple procedure with cholecystectomy and feeding jejunostomy.   Surgical pathology shows pancreatic neuroendocrine tumor (5.1 cm) , intermediate grade (G2).  0/10 LN negative.  KI -67 in some areas of tumor up to 5% ( the synoptic states there were 10 mitoses /10 HPF, no LV invasion.  No IHC was performed .    Since the surgery he is no longer having the spells .  He has had frequent abdominal imaging, most recently MRI , which did not show evidence of tumor.      We have been following neuroendocrine tumor markers as follows  6/5/17 :CGA 53, glucose 90  9/12/17: proinsulin < 1.6, C peptide 0.5, HgbA1c 5.1, CGA 62, glucose 88  1/16/18: proinsulin 2.1, C peptide 1.0, CGA 77  5/10/18: CGA 53, glucose 104  10/16/18: CGA 55, glucose 100  1/28/19: proinsulin 3.1 pM (< 8.0), C peptide 1.1, glucose 96,  HgbA1c 6.2% - followed appt,     We have the following imaging studies:   10/31/16: [facility] 3.9 cm mass pancreas with central necrosis. Mass encases 50% of adjacent SMV which remains patent. - Liver unremarkable.   11/11/16 CT abdomen: enhancing lesion with cystic component or central necrosis within head and uncinate process of pancreas concerning for malignancy.  1 cm indeterminate portacaval LN.   12/1/16 octreoscan: read as negative; I think the study shows very faint activity at the pancreatic mass site.  6/5/17 MRI abdomen: ? Liver mass  9/21/17  MRI abdomen: negative  5/10/18: MRI abdomen:    10/18/18 MRI abdomen: KIKO      REVIEW OF SYSTEMS  Eating normally  No spells  Weight up to 268 from 230   Energy good  Sleep OK  Cardiac: negative  Respiratory: negative  GI: occasional diarrhea  Occasional migraine - noc hange from baseline -   No sweating    Past Medical History  Past Medical History:   Diagnosis Date     Hypoglycemia     due to insulinoma     Migraine      Pancreatic neuroendocrine tumor- insulinoma 2016     Ruptured appendix 2003     Tinea versicolor      Uncomplicated  asthma         Past Surgical History:   Procedure Laterality Date     APPENDECTOMY       ENDOSCOPIC ULTRASOUND UPPER GASTROINTESTINAL TRACT (GI) N/A 11/18/2016    Procedure: ENDOSCOPIC ULTRASOUND, ESOPHAGOSCOPY / UPPER GASTROINTESTINAL TRACT (GI);  Surgeon: Guru Malathi Canas MD;  Location: [facility] OR     PICC INSERTION       WHIPPLE PROCEDURE N/A 12/8/2016    Procedure: WHIPPLE PROCEDURE;  Surgeon: Matthias Armenta MD;  Location: [facility] OR       Medications  Current Outpatient Medications   Medication     albuterol (PROAIR HFA, PROVENTIL HFA, VENTOLIN HFA) 108 (90 BASE) MCG/ACT inhaler     amylase-lipase-protease (CREON 6) 6000 units CPEP     Ascorbic Acid (VITAMIN C PO)     ASPIRIN PO     Cholecalciferol (VITAMIN D-3 PO)     fluticasone (FLOVENT HFA) 110 MCG/ACT inhaler     Multiple Vitamin (MULTI-VITAMINS) TABS     Omega-3 Fatty Acids (OMEGA-3 FISH OIL PO)     Probiotic Product (PROBIOTIC ADVANCED) CAPS     vitamin B complex with vitamin C (VITAMIN  B COMPLEX) TABS tablet     No current facility-administered medications for this visit.        Current Outpatient Medications   Medication Sig Dispense Refill     albuterol (PROAIR HFA, PROVENTIL HFA, VENTOLIN HFA) 108 (90 BASE) MCG/ACT inhaler Inhale 1-2 puffs into the lungs every 6 hours as needed for shortness of breath / dyspnea or wheezing       amylase-lipase-protease (CREON 6) 6000 units CPEP Take 1-2 capsules (6,000-12,000 Units) by mouth 3 times daily (with meals) 450 capsule 3     Ascorbic Acid (VITAMIN C PO) Take by mouth 2 times daily        ASPIRIN PO Take 81 mg by mouth       Cholecalciferol (VITAMIN D-3 PO) Take by mouth daily Pt unsure of dosage       fluticasone (FLOVENT HFA) 110 MCG/ACT inhaler Inhale 2 puffs into the lungs At Bedtime        Multiple Vitamin (MULTI-VITAMINS) TABS        Omega-3 Fatty Acids (OMEGA-3 FISH OIL PO) Take 1 g by mouth 2 times daily (with meals) 3 capsules       Probiotic Product (PROBIOTIC ADVANCED) CAPS Take by  "mouth daily       vitamin B complex with vitamin C (VITAMIN  B COMPLEX) TABS tablet Take 1 tablet by mouth daily       Allergies  Allergies   Allergen Reactions     Pollen Extract      Other reaction(s): Runny Nose     Cat Hair Extract      Other reaction(s): Runny Nose     Family History  family history includes Thyroid Disease in his sister.   No pancreatic tumors  Mom and dad alive and well    Social History  Social History     Tobacco Use     Smoking status: Never Smoker     Smokeless tobacco: Current User     Types: Chew     Tobacco comment: 1/2 can daily   Substance Use Topics     Alcohol use: Yes     Comment: 6-7 beers every 2-3 weeks     Drug use: No      .  Works nights 630 PM to 230 AM shift; gets up at 11 AM; .  Lifts weights, runs  Diet doesn't include carbohydrate -     Physical Exam  /78   Pulse 76   Ht 1.88 m (6' 2.02\")   Wt 121.6 kg (268 lb 0.2 oz)   BMI 34.40 kg/m    Body mass index is 34.4 kg/m .  GENERAL :  Large man In no apparent distress  SKIN: Normal color, normal temperature, texture.  No  purple striae.     EYES: PERRL, EOMI, No scleral icterus,  No proptosis, conjunctival redness, stare, retraction  MOUTH: Moist, pink; pharynx clear  NECK: No visible masses. No palpable adenopathy, or masses. No carotid bruits.   THYROID:  Not palpable  RESP: Lungs clear to auscultation bilaterally  CARDIAC: Regular rate and rhythm, normal S1 S2, without murmurs, rubs or gallops    ABDOMEN: Normal bowel sounds; soft, nontender, no HSM or masses       NEURO: awake, alert, responds appropriately to questions.  Cranial nerves intact.  Moves all extremities; Gait normal.  No tremor of the outstretched hand.  DTRs   1/4 ,   EXTREMITIES: No clubbing, cyanosis or edema.    DATA REVIEW       Ref. Range 1/28/2019 16:57   Hemoglobin A1C Latest Ref Range: 0 - 5.6 % 6.2 (H)   C-Peptide Latest Ref Range: 0.9 - 6.9 ng/mL 1.1   Proinsulin Latest Ref Range: <=8.0 pmol/L 3.1   Glucose Latest Ref Range: 70 - " 99 mg/dL 96     MRCP Without and With Contrast     CLINICAL HISTORY: h/o insulinoma, s/p resection in Dec 2016;  Insulinoma     DATE: 10/18/2018 1:28 PM     TECHNIQUE:      Images were acquired with and without intravenous gadolinium contrast  through the upper abdomen. The following MR images were acquired  without intravenous contrast: TrueFISP, multiplanar T2-weighted, axial  T1 in/out of phase, T2-weighted MRCP images, axial diffusion-weighted  and axial apparent diffusion coefficient. T1-weighted images were  obtained before contrast at the multiple time points following  contrast injection. 3-D reformatted images were generated by the  technologist. Contrast dose: 10cc's Gadavist     Comparison study: MRI 5/10/2018     FINDINGS:     Pancreas: Postoperative changes of Whipple procedure. Remaining  pancreatic parenchyma maintains normal T1 signal intensity. Stable  mild pancreatic duct dilatation measuring up to 6 mm. No new  pancreatic lesion.     Remainder of the abdomen: Liver surface is smooth and there is no  signal loss on an out of phase imaging. No focal liver lesion.     Spleen is normal in size. The adrenal glands and kidneys are normal in  size. Visualized small and large bowel are nondistended. Oliva  hernia containing a non-dilated short segment of transverse colon.  Cholecystectomy. No intrahepatic biliary dilatation. No free fluid.  Stable 2.1 x 1.6 cm portacaval lymph node (series 24 image 38). No  abdominal aortic aneurysm. Patent vasculature.     Lung bases: Within normal limits.     Bones: Enhancing foci in the bilateral posterior iliac bones are  unchanged from 1/6/2018, the largest measuring 1.5 cm on series 26  image 11). No new suspicious bone lesion.                                                                      IMPRESSION:   1. In this patient with history of pancreatic insulinoma status post  Whipple procedure, there is no evidence of locally recurrent or  metastatic  disease.  2. Stable prominent portacaval lymph node.     I have personally reviewed the examination and initial interpretation  and I agree with the findings.     ROULA HOLLINGSWORTH MD

## 2019-01-28 NOTE — LETTER
RE: Joey Guerrero  [address]  [place] [address]     Dear Colleague,    Thank you for referring your patient, Joey Guerrero, to the [facility] ENDOCRINOLOGY at [facility]. Please see a copy of my visit note below.    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 Tumor was 5.1 cm, not pathologically  malignant (as defined by distant mets, vascular or capsular invasion).  This does not fully exclude malignancy.   pT2, pNo, pMx, stage IB assuming no distant mets.   There were no elevated  markers measured prior to surgery , which will help us detect recurrence, other than the hypoglycemia/ hyperinsulinemia The primary tumor uptake of octreotide was not zero but it appeared quite low on the octreoscan. The proinsulin seemed to be the same with fasting and non-fasting.     He no longer has symptoms suggestive of hypoglycemia.  I have noted that think that the proinsulin might be a useful tumor marker for him.  We will follow this (always in the context of glucose and c peptide)    S/p Whipple procedure -   on treatment of pancreatic exocrine replacement. This may be a risk for future diabetes development, depending on the volume of pancreatic tissue that was actually removed.      Pre-diabetes in the past.  Repeat A1c in the lab.  Results following the appt show A1c 6.2% in the laboratory (which is higher than the POC test done in the office, which doesn't perform as well at the lower levels).      Obesity by BMI.  His weight is up from jennifer 230 9/17 but still lower than 283 prior to surgery for # 1.    Lifestyle to maintain weight or to lose a small amount would be ideal given the pre-diabetes.      Hilda Ortiz MD      Cc/HISTORY OF PRESENT ILLNESS  43- year old man presents, along with 2 sons,  for follow up of history of insulinoma. See my 5/10/17 note for  description of the presentation and diagnostic tests     His treatment course has been as follows:  11/18/16 FNA -EUS pancreatic head mass was positive for malignancy - neuroendocrine tumor.    On 12/8/16 he was treated with open nonpylorus preserving Whipple procedure with cholecystectomy and feeding jejunostomy.   Surgical pathology shows pancreatic neuroendocrine tumor (5.1 cm) , intermediate grade (G2).  0/10 LN negative.  KI -67 in some areas of tumor up to 5% ( the synoptic states there were 10 mitoses /10 HPF, no LV invasion.  No IHC was performed .    Since the surgery he is no longer having the spells .  He has had frequent abdominal imaging, most recently MRI , which did not show evidence of tumor.      We have been following neuroendocrine tumor markers as follows  6/5/17 :CGA 53, glucose 90  9/12/17: proinsulin < 1.6, C peptide 0.5, HgbA1c 5.1, CGA 62, glucose 88  1/16/18: proinsulin 2.1, C peptide 1.0, CGA 77  5/10/18: CGA 53, glucose 104  10/16/18: CGA 55, glucose 100  1/28/19: proinsulin 3.1 pM (< 8.0), C peptide 1.1, glucose 96,  HgbA1c 6.2% - followed appt,     We have the following imaging studies:   10/31/16: MRI abdomen pancreas ([facility]): 3.9 cm mass pancreas with central necrosis. Mass encases 50% of adjacent SMV which remains patent. - Liver unremarkable.   11/11/16 CT abdomen: enhancing lesion with cystic component or central necrosis within head and uncinate process of pancreas concerning for malignancy.  1 cm indeterminate portacaval LN.   12/1/16 octreoscan: read as negative; I think the study shows very faint activity at the pancreatic mass site.  6/5/17 MRI abdomen: ? Liver mass  9/21/17  MRI abdomen: negative  5/10/18: MRI abdomen:    10/18/18 MRI abdomen: KIKO    Past Medical History  Past Medical History:   Diagnosis Date     Hypoglycemia     due to insulinoma     Migraine      Pancreatic neuroendocrine tumor- insulinoma 2016     Ruptured appendix 2003     Tinea versicolor       Uncomplicated asthma       Past Surgical History:   Procedure Laterality Date     APPENDECTOMY       ENDOSCOPIC ULTRASOUND UPPER GASTROINTESTINAL TRACT (GI) N/A 11/18/2016    Procedure: ENDOSCOPIC ULTRASOUND, ESOPHAGOSCOPY / UPPER GASTROINTESTINAL TRACT (GI);  Surgeon: Guru Malathi Canas MD;  Location: [facility] OR     PICC INSERTION       WHIPPLE PROCEDURE N/A 12/8/2016    Procedure: WHIPPLE PROCEDURE;  Surgeon: Matthias Armenta MD;  Location: [facility] OR     Medications  Current Outpatient Medications   Medication     albuterol (PROAIR HFA, PROVENTIL HFA, VENTOLIN HFA) 108 (90 BASE) MCG/ACT inhaler     amylase-lipase-protease (CREON 6) 6000 units CPEP     Ascorbic Acid (VITAMIN C PO)     ASPIRIN PO     Cholecalciferol (VITAMIN D-3 PO)     fluticasone (FLOVENT HFA) 110 MCG/ACT inhaler     Multiple Vitamin (MULTI-VITAMINS) TABS     Omega-3 Fatty Acids (OMEGA-3 FISH OIL PO)     Probiotic Product (PROBIOTIC ADVANCED) CAPS     vitamin B complex with vitamin C (VITAMIN  B COMPLEX) TABS tablet     No current facility-administered medications for this visit.        Current Outpatient Medications   Medication Sig Dispense Refill     albuterol (PROAIR HFA, PROVENTIL HFA, VENTOLIN HFA) 108 (90 BASE) MCG/ACT inhaler Inhale 1-2 puffs into the lungs every 6 hours as needed for shortness of breath / dyspnea or wheezing       amylase-lipase-protease (CREON 6) 6000 units CPEP Take 1-2 capsules (6,000-12,000 Units) by mouth 3 times daily (with meals) 450 capsule 3     Ascorbic Acid (VITAMIN C PO) Take by mouth 2 times daily        ASPIRIN PO Take 81 mg by mouth       Cholecalciferol (VITAMIN D-3 PO) Take by mouth daily Pt unsure of dosage       fluticasone (FLOVENT HFA) 110 MCG/ACT inhaler Inhale 2 puffs into the lungs At Bedtime        Multiple Vitamin (MULTI-VITAMINS) TABS        Omega-3 Fatty Acids (OMEGA-3 FISH OIL PO) Take 1 g by mouth 2 times daily (with meals) 3 capsules       Probiotic Product (PROBIOTIC  "ADVANCED) CAPS Take by mouth daily       vitamin B complex with vitamin C (VITAMIN  B COMPLEX) TABS tablet Take 1 tablet by mouth daily       Allergies  Allergies   Allergen Reactions     Pollen Extract      Other reaction(s): Runny Nose     Cat Hair Extract      Other reaction(s): Runny Nose     Family History  family history includes Thyroid Disease in his sister.   No pancreatic tumors  Mom and dad alive and well    Social History  Social History     Tobacco Use     Smoking status: Never Smoker     Smokeless tobacco: Current User     Types: Chew     Tobacco comment: 1/2 can daily   Substance Use Topics     Alcohol use: Yes     Comment: 6-7 beers every 2-3 weeks     Drug use: No      .  Works nights 630 PM to 230 AM shift; gets up at 11 AM; .  Lifts weights, runs  Diet doesn't include carbohydrate -     Physical Exam  /78   Pulse 76   Ht 1.88 m (6' 2.02\")   Wt 121.6 kg (268 lb 0.2 oz)   BMI 34.40 kg/m     Body mass index is 34.4 kg/m .  GENERAL :  Large man In no apparent distress  SKIN: Normal color, normal temperature, texture.  No  purple striae.     EYES: PERRL, EOMI, No scleral icterus,  No proptosis, conjunctival redness, stare, retraction  MOUTH: Moist, pink; pharynx clear  NECK: No visible masses. No palpable adenopathy, or masses. No carotid bruits.   THYROID:  Not palpable  RESP: Lungs clear to auscultation bilaterally  CARDIAC: Regular rate and rhythm, normal S1 S2, without murmurs, rubs or gallops    ABDOMEN: Normal bowel sounds; soft, nontender, no HSM or masses       NEURO: awake, alert, responds appropriately to questions.  Cranial nerves intact.  Moves all extremities; Gait normal.  No tremor of the outstretched hand.  DTRs   1/4 ,   EXTREMITIES: No clubbing, cyanosis or edema.    DATA REVIEW    MRCP Without and With Contrast     CLINICAL HISTORY: h/o insulinoma, s/p resection in Dec 2016;  Insulinoma     DATE: 10/18/2018 1:28 PM     TECHNIQUE:      Images were acquired with and " without intravenous gadolinium contrast  through the upper abdomen. The following MR images were acquired  without intravenous contrast: TrueFISP, multiplanar T2-weighted, axial  T1 in/out of phase, T2-weighted MRCP images, axial diffusion-weighted  and axial apparent diffusion coefficient. T1-weighted images were  obtained before contrast at the multiple time points following  contrast injection. 3-D reformatted images were generated by the  technologist. Contrast dose: 10cc's Gadavist     Comparison study: MRI 5/10/2018     FINDINGS:     Pancreas: Postoperative changes of Whipple procedure. Remaining  pancreatic parenchyma maintains normal T1 signal intensity. Stable  mild pancreatic duct dilatation measuring up to 6 mm. No new  pancreatic lesion.     Remainder of the abdomen: Liver surface is smooth and there is no  signal loss on an out of phase imaging. No focal liver lesion.     Spleen is normal in size. The adrenal glands and kidneys are normal in  size. Visualized small and large bowel are nondistended. Oliva  hernia containing a non-dilated short segment of transverse colon.  Cholecystectomy. No intrahepatic biliary dilatation. No free fluid.  Stable 2.1 x 1.6 cm portacaval lymph node (series 24 image 38). No  abdominal aortic aneurysm. Patent vasculature.     Lung bases: Within normal limits.     Bones: Enhancing foci in the bilateral posterior iliac bones are  unchanged from 1/6/2018, the largest measuring 1.5 cm on series 26  image 11). No new suspicious bone lesion.                IMPRESSION:   1. In this patient with history of pancreatic insulinoma status post  Whipple procedure, there is no evidence of locally recurrent or  metastatic disease.  2. Stable prominent portacaval lymph node.     I have personally reviewed the examination and initial interpretation  and I agree with the findings.     ROULA HOLLINGSWORTH MD    Again, thank you for allowing me to participate in the care of your patient.       Sincerely,    Eva Ortiz MD

## 2019-01-29 LAB — C PEPTIDE SERPL-MCNC: 1.1 NG/ML (ref 0.9–6.9)

## 2019-02-01 LAB — PROINSULIN P 12H FAST SERPL-SCNC: 3.1 PMOL/L

## 2019-02-17 PROBLEM — Z98.890 HISTORY OF PANCREATIC SURGERY: Status: ACTIVE | Noted: 2019-02-17

## 2019-04-18 ENCOUNTER — ANCILLARY PROCEDURE (OUTPATIENT)
Dept: MRI IMAGING | Facility: CLINIC | Age: 44
End: 2019-04-18
Attending: INTERNAL MEDICINE
Payer: COMMERCIAL

## 2019-04-18 DIAGNOSIS — D13.7 INSULINOMA: ICD-10-CM

## 2019-04-18 LAB
ALBUMIN SERPL-MCNC: 4 G/DL (ref 3.4–5)
ALP SERPL-CCNC: 33 U/L (ref 40–150)
ALT SERPL W P-5'-P-CCNC: 60 U/L (ref 0–70)
ANION GAP SERPL CALCULATED.3IONS-SCNC: 4 MMOL/L (ref 3–14)
AST SERPL W P-5'-P-CCNC: 43 U/L (ref 0–45)
BILIRUB SERPL-MCNC: 0.8 MG/DL (ref 0.2–1.3)
BUN SERPL-MCNC: 32 MG/DL (ref 7–30)
CALCIUM SERPL-MCNC: 9.2 MG/DL (ref 8.5–10.1)
CHLORIDE SERPL-SCNC: 102 MMOL/L (ref 94–109)
CO2 SERPL-SCNC: 28 MMOL/L (ref 20–32)
CREAT SERPL-MCNC: 0.93 MG/DL (ref 0.66–1.25)
GFR SERPL CREATININE-BSD FRML MDRD: >90 ML/MIN/{1.73_M2}
GLUCOSE SERPL-MCNC: 94 MG/DL (ref 70–99)
POTASSIUM SERPL-SCNC: 4.3 MMOL/L (ref 3.4–5.3)
PROT SERPL-MCNC: 7.3 G/DL (ref 6.8–8.8)
SODIUM SERPL-SCNC: 135 MMOL/L (ref 133–144)

## 2019-04-18 RX ORDER — GADOBUTROL 604.72 MG/ML
15 INJECTION INTRAVENOUS ONCE
Status: COMPLETED | OUTPATIENT
Start: 2019-04-18 | End: 2019-04-18

## 2019-04-18 RX ADMIN — GADOBUTROL 13 ML: 604.72 INJECTION INTRAVENOUS at 13:02

## 2019-04-18 NOTE — DISCHARGE INSTRUCTIONS
MRI Contrast Discharge Instructions    The IV contrast you received today will pass out of your body in your  urine. This will happen in the next 24 hours. You will not feel this process.  Your urine will not change color.    Drink at least 4 extra glasses of water or juice today (unless your doctor  has restricted your fluids). This reduces the stress on your kidneys.  You may take your regular medicines.    If you are on dialysis: It is best to have dialysis today.    If you have a reaction: Most reactions happen right away. If you have  any new symptoms after leaving the hospital (such as hives or swelling),  call your hospital at the correct number below. Or call your family doctor.  If you have breathing distress or wheezing, call 911.    Special instructions: ***    I have read and understand the above information.    Signature:______________________________________ Date:___________    Staff:__________________________________________ Date:___________     Time:__________    [facility] Departments:    ___[place]: [phone]  ___[facility]: [phone]  ___[place]: [phone] ___[facility]: [phone]  ___[facility]: [phone]  ___[facility]: [phone]  ___[phone]  ___[facility]: [phone]  ___[phone]

## 2019-04-21 LAB — CGA SERPL-MCNC: 52 NG/ML (ref 0–95)

## 2019-04-21 NOTE — PROGRESS NOTES
Oncology Follow-up Visit:  Apr 22, 2019    Diagnosis: [id number] pancreatic head insulinoma.  No evidence of metastatic disease.     Treatment to date: Whipple resection December 8, 2016, by Dr. Matthias Armenta. Zero of 10 lymph nodes involved, grade 2, negative resection margins, Ki-67 equals 5%.  preoperative serum chromogranin A = 70 on 12/05/2016.     REFERRING PHYSICIAN:  Dr. Matthias Armenta, Hepatobiliary Surgery, [facility].      Oncology History:  Over a 2-3 year period, beginning in 2014 or so, he was having hypoglycemic episodes of unexplained origin.  He was even hospitalized for one such episode in the fall of 2016.  He met with a primary care physician, and subsequently an endocrinologist.  There was concern for hypoglycemic cause and concern for potential insulinoma.  On 10/31/2016, MRI at [facility] revealed a 3.9 x 3.7 x 3.6 cm mass in the head of the pancreas.  There was involvement of the superior mesenteric vein, but no evidence of metastatic disease noted.  He met with Dr. Migel Sena at [facility], and a referral was made for surgical evaluation.      On 11/11/2016, a CT abdomen and pelvis was performed revealing an enhancing lesion with a cystic component versus central necrosis in the head and the uncinate process of the pancreas.  There was an indeterminate portacaval lymph node measuring 2.1 cm.  Otherwise, no evidence of suspicious adenopathy in the abdomen.  EUS was performed by Dr. Guru Canas at the [facility] on 11/18/2016.  He performed an FNA.  This revealed the tumor was composed of grade 1 neuroendocrine tumor with a low proliferation marker of Ki-67 less than 2%.  The tumor was positive for synaptophysin, chromogranin and CD56.      On 12/01/2016, the patient underwent a preoperative octreotide scan that did not show any evidence of metastatic disease or tumor.  There was no radiotracer uptake.  A baseline chromogranin A was 70.      His C peptide  at baseline was 0.7.  Insulin level at the preoperative baseline was 2.2 and 3.5.  Proinsulin levels were less than 7.5, and a subsequent level was 9.6.     On 12/05/2016, the patient met with Dr. Matthias Armenta, who took the patient to the operating room on 12/08/2016 to perform a Whipple.  The pancreaticoduodenectomy resulted in the revealing of a pancreatic neuroendocrine tumor at the head of the pancreas that measured 5.1 cm with intermediate grade G2.  Margins were negative by 0.1 cm.  Zero of 12 lymph nodes were involved.  The Ki-67 registered at 5% with 4 mitoses/10 high-power fields.  There was no perivascular, nor perineural invasion.  The pathologic stage was T2.  The pathologic N stage was N0, and M0 per the prior octreotide scan.  The patient is currently 9 weeks out from surgery.    2/6/17 - - medical oncology consultation, Dr. Hanley.  6/5/17 - - MRI abdomen: IMPRESSION:   1. Postsurgical of Whipple procedure evidence of locally recurrent  neuroendocrine tumor.  2. 1.2 cm ill-defined hepatic lesion in the central liver. This is  indeterminate and could represent sequela of recent episode of  inflammation or infection. However, it is new from prior and  metastases are not excluded. Consider short-term follow-up MRI.  3. Mildly increased size of pericaval lymph node and unchanged mildly  enlarged randy hepatis lymph node, indeterminate but favored to be  reactive.  6/9/17 - - oncology follow-up, Dr. Hanley.  9/12/17 - - endocrine consultation, Dr. Ortiz. Assessment of insulinoma: recommendation to follow Pro-insulin is a potential useful tumor marker for recurrence, to be followed by endocrinology, in context of glucose and C-peptide levels.  9/12/17 - - MRI abdomen: IMPRESSION: In this patient with history of pancreatic head  insulinoma, post Whipple resection there is no evidence for metastatic  disease within the abdomen. Previously described lesion in the central  liver near the IVC is not well  characterized in the present study.  9/15/17 - - oncology follow-up, Dr. Hanley.  1/16/18 - - MRI abdomen: IMPRESSION:   In this patient with history of insulinoma in the pancreatic head  status post Whipple procedure:  1. No evidence of metastatic disease within the abdomen.  2. Mild pancreatic duct ectasia measuring up to 6 mm. This is  unchanged to minimally increased when compared to the prior exam and  of uncertain clinical significance. No significant associated T1  abnormality to suggest pancreatitis.  1/19/18 - - oncology follow-up, Dr. Hanley.  5/10/18 - - MRI abdomen IMPRESSION: In this patient with history of neuroendocrine tumor of  the pancreas and subsequent Whipple procedure:  1. Slightly increased size of a precaval lymph node, currently  measuring 1.5 x 2.0 cm comparison measured 1.3 x 1.6 cm on 1/16/2018  exam. This is indeterminate  2. Mild persistent dilatation of the main pancreatic duct measuring 6mm.    5/14/18 - - oncology follow-up, Dr. Hanley.  6/12/18 - - patient was no-show for endocrinology visit with Dr. Ortiz.  10/18/18 - - MRI abdomen: IMPRESSION:   1. In this patient with history of pancreatic insulinoma status post  Whipple procedure, there is no evidence of locally recurrent or  metastatic disease.  2. Stable prominent portacaval lymph node.  10/22/18 - - oncology follow-up, Dr. Hanley.  4/18/19 - - MRI abdomen: IMPRESSION: Postoperative changes from Whipple procedure for  insulinoma without evidence of recurrent disease in the abdomen.  4/22/19 - - oncology follow-up, Dr. Hanley.        Interim History/History Of Present Illness:  Mr. Joey Guerrero is a 44 year old  man with pancreatic head insulinoma resected by Dr. Armenta on December 8, 2016.    Mr. Guerrero returns to the clinic today with his wife.  He is 2-1/2 years out from his resection by Dr. Armenta.  He is generally feeling well.  He has not been feeling any recurrent symptoms that characterized his initial presentation at  diagnosis of insulinoma in late 10/2016.  He had a Whipple resection by Dr. Armenta 12/08 of that year.  He is now 2-1/2 years out and an MRI abdomen done last week shows no evidence of recurrent disease.  His chromogranin remains unremarkable.  He denies any blurriness, headache, dizziness or any other hypo or hyperglycemic symptoms.  He met recently with Dr. Ortiz from Endocrinology in late January and she felt he was doing well and does not require any intervention based on his normal symptomatology.             Review Of Systems:  Full 12-point ROS reviewed. Pertinent symptoms reviewed above per HPI.    Past medical, social, surgical, and family histories reviewed.     PAST MEDICAL HISTORY:  Pancreatic neuroendocrine tumor, pathologic T2 N0 M0 per above, status post Whipple surgery on 12/08/2016.  Other surgeries notable include an appendectomy in 2007.      FAMILY HISTORY:  Denies any family history of malignancy.      SOCIAL HISTORY:  The patient lives in [place], [place], with his wife.  They have 2 boys, ages 8 and 4.  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    Tobacco:  Used to chew and smoke occasional cigarettes since the age of 18 until recently.    Alcohol:  Denies alcohol abuse.    Drugs:  Denies illicit drug use.     Allergies:  Allergies as of 04/22/2019 - Reviewed 01/28/2019   Allergen Reaction Noted     Pollen extract  12/05/2016     Cat hair extract  12/05/2016       Current Medications:  Current Outpatient Medications   Medication Sig Dispense Refill     albuterol (PROAIR HFA, PROVENTIL HFA, VENTOLIN HFA) 108 (90 BASE) MCG/ACT inhaler Inhale 1-2 puffs into the lungs every 6 hours as needed for shortness of breath / dyspnea or wheezing       amylase-lipase-protease (CREON 6) 6000 units CPEP Take 1-2 capsules (6,000-12,000 Units) by mouth 3 times daily (with meals) 450 capsule 3     Ascorbic Acid (VITAMIN C PO) Take by mouth 2 times daily        ASPIRIN PO Take 81 mg by  "mouth       Cholecalciferol (VITAMIN D-3 PO) Take by mouth daily Pt unsure of dosage       fluticasone (FLOVENT HFA) 110 MCG/ACT inhaler Inhale 2 puffs into the lungs At Bedtime        Multiple Vitamin (MULTI-VITAMINS) TABS        Omega-3 Fatty Acids (OMEGA-3 FISH OIL PO) Take 1 g by mouth 2 times daily (with meals) 3 capsules       Probiotic Product (PROBIOTIC ADVANCED) CAPS Take by mouth daily       vitamin B complex with vitamin C (VITAMIN  B COMPLEX) TABS tablet Take 1 tablet by mouth daily          Physical Exam:  /80   Pulse 62   Temp 97.9  F (36.6  C) (Oral)   Resp 14   Ht 1.88 m (6' 2\")   Wt 121.1 kg (267 lb)   SpO2 97%   BMI 34.28 kg/m         GENERAL:  Very pleasant, robust,  gentleman in no acute distress, alert and oriented x3  HEENT:  Anicteric sclerae.  Oropharynx is clear without mucositis or thrush.   CARDIOVASCULAR:  Regular rate and rhythm.  Normal S1, S2.  No murmurs, gallops or rubs.   LUNGS:  Clear to auscultation bilaterally.   ABDOMEN:  Soft, non tender and non distended.  The midline surgical scar has well-healed.    EXTREMITIES:  No edema noted.       Laboratory/Imaging Studies.  Results for orders placed or performed in visit on 04/18/19   Comprehensive metabolic panel   Result Value Ref Range    Sodium 135 133 - 144 mmol/L    Potassium 4.3 3.4 - 5.3 mmol/L    Chloride 102 94 - 109 mmol/L    Carbon Dioxide 28 20 - 32 mmol/L    Anion Gap 4 3 - 14 mmol/L    Glucose 94 70 - 99 mg/dL    Urea Nitrogen 32 (H) 7 - 30 mg/dL    Creatinine 0.93 0.66 - 1.25 mg/dL    GFR Estimate >90 >60 mL/min/[1.73_m2]    GFR Estimate If Black >90 >60 mL/min/[1.73_m2]    Calcium 9.2 8.5 - 10.1 mg/dL    Bilirubin Total 0.8 0.2 - 1.3 mg/dL    Albumin 4.0 3.4 - 5.0 g/dL    Protein Total 7.3 6.8 - 8.8 g/dL    Alkaline Phosphatase 33 (L) 40 - 150 U/L    ALT 60 0 - 70 U/L    AST 43 0 - 45 U/L   Chromogranin A   Result Value Ref Range    Chromogranin A 52 0 - 95 ng/mL         RADIOLOGY:  Prior " to his visit I reviewed the MRI abdomen done 04/18.  I printed out 2 copies for the patient and his wife.  I had copies of the laboratory values, reviewed them in full and gave them a copy by the end of the visit.           ASSESSMENT/PLAN:  44 year old man with pancreatic head insulinoma, who presented with hypoglycemic episodes for several years prior to diagnosis. He had Whipple surgery by Dr. Matthias Armenta on 12/08/2016 (pT2NM0).     He is ~2.5 years out from his definitive surgery performed by Dr. Matthias Armenta for his pancreatic head insulinoma.  He has relatively low risk with relatively low Ki-67 index.  He had negative margins.  It was grade 2 at the time with negative lymph node involvement.  Chromogranin A markers have been unremarkable.     I suggested continued surveillance and I congratulated him on doing well.  He is asymptomatic without evidence of radiographic recurrence.  I suggested a 6-month followup with MRI abdomen and labs including CMP and chromogranin A.  He will follow up with me within 1 week to review the results.  He is very amenable to this.  I stated he could certainly make it within a month sooner to avoid the winter drive down if he so prefers.  He will talk with his wife and schedule before he leaves today.             I spent 30 minutes in consultation, including H&P and Discussion. >50% of time was spent in counseling and in coordination of care.    Brandan Hanley MD, PhD

## 2019-04-22 ENCOUNTER — ONCOLOGY VISIT (OUTPATIENT)
Dept: ONCOLOGY | Facility: CLINIC | Age: 44
End: 2019-04-22
Attending: INTERNAL MEDICINE
Payer: COMMERCIAL

## 2019-04-22 VITALS
TEMPERATURE: 97.9 F | HEIGHT: 74 IN | BODY MASS INDEX: 34.27 KG/M2 | DIASTOLIC BLOOD PRESSURE: 80 MMHG | SYSTOLIC BLOOD PRESSURE: 129 MMHG | RESPIRATION RATE: 14 BRPM | WEIGHT: 267 LBS | HEART RATE: 62 BPM | OXYGEN SATURATION: 97 %

## 2019-04-22 DIAGNOSIS — D13.7 INSULINOMA: Primary | ICD-10-CM

## 2019-04-22 PROCEDURE — G0463 HOSPITAL OUTPT CLINIC VISIT: HCPCS | Mod: ZF

## 2019-04-22 PROCEDURE — 99214 OFFICE O/P EST MOD 30 MIN: CPT | Mod: ZP | Performed by: INTERNAL MEDICINE

## 2019-04-22 ASSESSMENT — MIFFLIN-ST. JEOR: SCORE: 2160.85

## 2019-04-22 ASSESSMENT — PAIN SCALES - GENERAL: PAINLEVEL: NO PAIN (0)

## 2019-04-22 NOTE — NURSING NOTE
"Oncology Rooming Note    April 22, 2019 11:20 AM   Joey Guerrero is a 44 year old male who presents for:    Chief Complaint   Patient presents with     Oncology Clinic Visit     Return Neuroendocrine Tumor; 6 month check     Initial Vitals: /80   Pulse 62   Temp 97.9  F (36.6  C) (Oral)   Resp 14   Ht 1.88 m (6' 2\")   Wt 121.1 kg (267 lb)   SpO2 97%   BMI 34.28 kg/m   Estimated body mass index is 34.28 kg/m  as calculated from the following:    Height as of this encounter: 1.88 m (6' 2\").    Weight as of this encounter: 121.1 kg (267 lb). Body surface area is 2.51 meters squared.  No Pain (0) Comment: Data Unavailable   No LMP for male patient.  Allergies reviewed: Yes  Medications reviewed: Yes    Medications: Medication refills not needed today.  Pharmacy name entered into Health: [place]:    [facility] [address] [place], [place] - [address] [facility] [address] [facility] [address] AT [facility] OF  [address]    Clinical concerns: Scan and lab results; No new concerns.         Rachel Baker CMA              "

## 2019-04-22 NOTE — LETTER
4/22/2019       RE: Joey Guerrero  [address]  [facility] [address]     Dear Colleague,    Thank you for referring your patient, Joey Guerrero, to the [facility]. Please see a copy of my visit note below.    Oncology Follow-up Visit:  Apr 22, 2019    Diagnosis: [id number] pancreatic head insulinoma.  No evidence of metastatic disease.     Treatment to date: Whipple resection December 8, 2016, by Dr. Matthias Armenta. Zero of 10 lymph nodes involved, grade 2, negative resection margins, Ki-67 equals 5%.  preoperative serum chromogranin A = 70 on 12/05/2016.     REFERRING PHYSICIAN:  Dr. Matthias Armenta, Hepatobiliary Surgery, [facility].      Oncology History:  Over a 2-3 year period, beginning in 2014 or so, he was having hypoglycemic episodes of unexplained origin.  He was even hospitalized for one such episode in the fall of 2016.  He met with a primary care physician, and subsequently an endocrinologist.  There was concern for hypoglycemic cause and concern for potential insulinoma.  On 10/31/2016, MRI at [facility] revealed a 3.9 x 3.7 x 3.6 cm mass in the head of the pancreas.  There was involvement of the superior mesenteric vein, but no evidence of metastatic disease noted.  He met with Dr. Migel Sena at [facility], and a referral was made for surgical evaluation.      On 11/11/2016, a CT abdomen and pelvis was performed revealing an enhancing lesion with a cystic component versus central necrosis in the head and the uncinate process of the pancreas.  There was an indeterminate portacaval lymph node measuring 2.1 cm.  Otherwise, no evidence of suspicious adenopathy in the abdomen.  EUS was performed by Dr. Guru Canas at the [facility] on 11/18/2016.  He performed an FNA.  This revealed the tumor was composed of grade 1 neuroendocrine tumor with a low proliferation marker of Ki-67 less than 2%.  The tumor was positive for synaptophysin, chromogranin and  CD56.      On 12/01/2016, the patient underwent a preoperative octreotide scan that did not show any evidence of metastatic disease or tumor.  There was no radiotracer uptake.  A baseline chromogranin A was 70.      His C peptide at baseline was 0.7.  Insulin level at the preoperative baseline was 2.2 and 3.5.  Proinsulin levels were less than 7.5, and a subsequent level was 9.6.     On 12/05/2016, the patient met with Dr. Matthias Armenta, who took the patient to the operating room on 12/08/2016 to perform a Whipple.  The pancreaticoduodenectomy resulted in the revealing of a pancreatic neuroendocrine tumor at the head of the pancreas that measured 5.1 cm with intermediate grade G2.  Margins were negative by 0.1 cm.  Zero of 12 lymph nodes were involved.  The Ki-67 registered at 5% with 4 mitoses/10 high-power fields.  There was no perivascular, nor perineural invasion.  The pathologic stage was T2.  The pathologic N stage was N0, and M0 per the prior octreotide scan.  The patient is currently 9 weeks out from surgery.    2/6/17 - - medical oncology consultation, Dr. Hanley.  6/5/17 - - MRI abdomen: IMPRESSION:   1. Postsurgical of Whipple procedure evidence of locally recurrent  neuroendocrine tumor.  2. 1.2 cm ill-defined hepatic lesion in the central liver. This is  indeterminate and could represent sequela of recent episode of  inflammation or infection. However, it is new from prior and  metastases are not excluded. Consider short-term follow-up MRI.  3. Mildly increased size of pericaval lymph node and unchanged mildly  enlarged randy hepatis lymph node, indeterminate but favored to be  reactive.  6/9/17 - - oncology follow-up, Dr. Hanley.  9/12/17 - - endocrine consultation, Dr. Ortiz. Assessment of insulinoma: recommendation to follow Pro-insulin is a potential useful tumor marker for recurrence, to be followed by endocrinology, in context of glucose and C-peptide levels.  9/12/17 - - MRI abdomen: IMPRESSION: In  this patient with history of pancreatic head  insulinoma, post Whipple resection there is no evidence for metastatic  disease within the abdomen. Previously described lesion in the central  liver near the IVC is not well characterized in the present study.  9/15/17 - - oncology follow-up, Dr. Hanley.  1/16/18 - - MRI abdomen: IMPRESSION:   In this patient with history of insulinoma in the pancreatic head  status post Whipple procedure:  1. No evidence of metastatic disease within the abdomen.  2. Mild pancreatic duct ectasia measuring up to 6 mm. This is  unchanged to minimally increased when compared to the prior exam and  of uncertain clinical significance. No significant associated T1  abnormality to suggest pancreatitis.  1/19/18 - - oncology follow-up, Dr. Hanley.  5/10/18 - - MRI abdomen IMPRESSION: In this patient with history of neuroendocrine tumor of  the pancreas and subsequent Whipple procedure:  1. Slightly increased size of a precaval lymph node, currently  measuring 1.5 x 2.0 cm comparison measured 1.3 x 1.6 cm on 1/16/2018  exam. This is indeterminate  2. Mild persistent dilatation of the main pancreatic duct measuring 6mm.    5/14/18 - - oncology follow-up, Dr. Hanley.  6/12/18 - - patient was no-show for endocrinology visit with Dr. Ortiz.  10/18/18 - - MRI abdomen: IMPRESSION:   1. In this patient with history of pancreatic insulinoma status post  Whipple procedure, there is no evidence of locally recurrent or  metastatic disease.  2. Stable prominent portacaval lymph node.  10/22/18 - - oncology follow-up, Dr. Hanley.  4/18/19 - - MRI abdomen: IMPRESSION: Postoperative changes from Whipple procedure for  insulinoma without evidence of recurrent disease in the abdomen.  4/22/19 - - oncology follow-up, Dr. Hanley.        Interim History/History Of Present Illness:  Mr. Joey Guerrero is a 44 year old  man with pancreatic head insulinoma resected by Dr. Armenta on December 8, 2016.    Mr. Guerrero returns to  the clinic today with his wife.  He is 2-1/2 years out from his resection by Dr. Armenta.  He is generally feeling well.  He has not been feeling any recurrent symptoms that characterized his initial presentation at diagnosis of insulinoma in late 10/2016.  He had a Whipple resection by Dr. Armenta 12/08 of that year.  He is now 2-1/2 years out and an MRI abdomen done last week shows no evidence of recurrent disease.  His chromogranin remains unremarkable.  He denies any blurriness, headache, dizziness or any other hypo or hyperglycemic symptoms.  He met recently with Dr. Ortiz from Endocrinology in late January and she felt he was doing well and does not require any intervention based on his normal symptomatology.             Review Of Systems:  Full 12-point ROS reviewed. Pertinent symptoms reviewed above per HPI.    Past medical, social, surgical, and family histories reviewed.     PAST MEDICAL HISTORY:  Pancreatic neuroendocrine tumor, pathologic T2 N0 M0 per above, status post Whipple surgery on 12/08/2016.  Other surgeries notable include an appendectomy in 2007.      FAMILY HISTORY:  Denies any family history of malignancy.      SOCIAL HISTORY:  The patient lives in [place], [place], with his wife.  They have 2 boys, ages 8 and 4.  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    Tobacco:  Used to chew and smoke occasional cigarettes since the age of 18 until recently.    Alcohol:  Denies alcohol abuse.    Drugs:  Denies illicit drug use.     Allergies:  Allergies as of 04/22/2019 - Reviewed 01/28/2019   Allergen Reaction Noted     Pollen extract  12/05/2016     Cat hair extract  12/05/2016       Current Medications:  Current Outpatient Medications   Medication Sig Dispense Refill     albuterol (PROAIR HFA, PROVENTIL HFA, VENTOLIN HFA) 108 (90 BASE) MCG/ACT inhaler Inhale 1-2 puffs into the lungs every 6 hours as needed for shortness of breath / dyspnea or wheezing        "amylase-lipase-protease (CREON 6) 6000 units CPEP Take 1-2 capsules (6,000-12,000 Units) by mouth 3 times daily (with meals) 450 capsule 3     Ascorbic Acid (VITAMIN C PO) Take by mouth 2 times daily        ASPIRIN PO Take 81 mg by mouth       Cholecalciferol (VITAMIN D-3 PO) Take by mouth daily Pt unsure of dosage       fluticasone (FLOVENT HFA) 110 MCG/ACT inhaler Inhale 2 puffs into the lungs At Bedtime        Multiple Vitamin (MULTI-VITAMINS) TABS        Omega-3 Fatty Acids (OMEGA-3 FISH OIL PO) Take 1 g by mouth 2 times daily (with meals) 3 capsules       Probiotic Product (PROBIOTIC ADVANCED) CAPS Take by mouth daily       vitamin B complex with vitamin C (VITAMIN  B COMPLEX) TABS tablet Take 1 tablet by mouth daily          Physical Exam:  /80   Pulse 62   Temp 97.9  F (36.6  C) (Oral)   Resp 14   Ht 1.88 m (6' 2\")   Wt 121.1 kg (267 lb)   SpO2 97%   BMI 34.28 kg/m          GENERAL:  Very pleasant, robust,  gentleman in no acute distress, alert and oriented x3  HEENT:  Anicteric sclerae.  Oropharynx is clear without mucositis or thrush.   CARDIOVASCULAR:  Regular rate and rhythm.  Normal S1, S2.  No murmurs, gallops or rubs.   LUNGS:  Clear to auscultation bilaterally.   ABDOMEN:  Soft, non tender and non distended.  The midline surgical scar has well-healed.    EXTREMITIES:  No edema noted.       Laboratory/Imaging Studies.  Results for orders placed or performed in visit on 04/18/19   Comprehensive metabolic panel   Result Value Ref Range    Sodium 135 133 - 144 mmol/L    Potassium 4.3 3.4 - 5.3 mmol/L    Chloride 102 94 - 109 mmol/L    Carbon Dioxide 28 20 - 32 mmol/L    Anion Gap 4 3 - 14 mmol/L    Glucose 94 70 - 99 mg/dL    Urea Nitrogen 32 (H) 7 - 30 mg/dL    Creatinine 0.93 0.66 - 1.25 mg/dL    GFR Estimate >90 >60 mL/min/[1.73_m2]    GFR Estimate If Black >90 >60 mL/min/[1.73_m2]    Calcium 9.2 8.5 - 10.1 mg/dL    Bilirubin Total 0.8 0.2 - 1.3 mg/dL    Albumin 4.0 3.4 - " 5.0 g/dL    Protein Total 7.3 6.8 - 8.8 g/dL    Alkaline Phosphatase 33 (L) 40 - 150 U/L    ALT 60 0 - 70 U/L    AST 43 0 - 45 U/L   Chromogranin A   Result Value Ref Range    Chromogranin A 52 0 - 95 ng/mL         RADIOLOGY:  Prior to his visit I reviewed the MRI abdomen done 04/18.  I printed out 2 copies for the patient and his wife.  I had copies of the laboratory values, reviewed them in full and gave them a copy by the end of the visit.           ASSESSMENT/PLAN:  44 year old man with pancreatic head insulinoma, who presented with hypoglycemic episodes for several years prior to diagnosis. He had Whipple surgery by Dr. Matthias Armenta on 12/08/2016 (pT2NM0).     He is ~2.5 years out from his definitive surgery performed by Dr. Matthias Armenta for his pancreatic head insulinoma.  He has relatively low risk with relatively low Ki-67 index.  He had negative margins.  It was grade 2 at the time with negative lymph node involvement.  Chromogranin A markers have been unremarkable.     I suggested continued surveillance and I congratulated him on doing well.  He is asymptomatic without evidence of radiographic recurrence.  I suggested a 6-month followup with MRI abdomen and labs including CMP and chromogranin A.  He will follow up with me within 1 week to review the results.  He is very amenable to this.  I stated he could certainly make it within a month sooner to avoid the winter drive down if he so prefers.  He will talk with his wife and schedule before he leaves today.       I spent 30 minutes in consultation, including H&P and Discussion. >50% of time was spent in counseling and in coordination of care.    Brandan Hanley MD, PhD

## 2019-10-17 ENCOUNTER — ANCILLARY PROCEDURE (OUTPATIENT)
Dept: MRI IMAGING | Facility: CLINIC | Age: 44
End: 2019-10-17
Attending: INTERNAL MEDICINE
Payer: COMMERCIAL

## 2019-10-17 DIAGNOSIS — D13.7 INSULINOMA: ICD-10-CM

## 2019-10-17 LAB
ALBUMIN SERPL-MCNC: 3.8 G/DL (ref 3.4–5)
ALP SERPL-CCNC: 30 U/L (ref 40–150)
ALT SERPL W P-5'-P-CCNC: 79 U/L (ref 0–70)
ANION GAP SERPL CALCULATED.3IONS-SCNC: 6 MMOL/L (ref 3–14)
AST SERPL W P-5'-P-CCNC: 43 U/L (ref 0–45)
BILIRUB SERPL-MCNC: 0.7 MG/DL (ref 0.2–1.3)
BUN SERPL-MCNC: 26 MG/DL (ref 7–30)
CALCIUM SERPL-MCNC: 8.8 MG/DL (ref 8.5–10.1)
CHLORIDE SERPL-SCNC: 106 MMOL/L (ref 94–109)
CO2 SERPL-SCNC: 26 MMOL/L (ref 20–32)
CREAT SERPL-MCNC: 0.95 MG/DL (ref 0.66–1.25)
GFR SERPL CREATININE-BSD FRML MDRD: >90 ML/MIN/{1.73_M2}
GLUCOSE SERPL-MCNC: 94 MG/DL (ref 70–99)
POTASSIUM SERPL-SCNC: 3.9 MMOL/L (ref 3.4–5.3)
PROT SERPL-MCNC: 7 G/DL (ref 6.8–8.8)
SODIUM SERPL-SCNC: 138 MMOL/L (ref 133–144)

## 2019-10-17 PROCEDURE — 80053 COMPREHEN METABOLIC PANEL: CPT | Performed by: INTERNAL MEDICINE

## 2019-10-17 PROCEDURE — 86316 IMMUNOASSAY TUMOR OTHER: CPT | Performed by: INTERNAL MEDICINE

## 2019-10-17 RX ORDER — GADOBUTROL 604.72 MG/ML
15 INJECTION INTRAVENOUS ONCE
Status: COMPLETED | OUTPATIENT
Start: 2019-10-17 | End: 2019-10-17

## 2019-10-17 RX ADMIN — GADOBUTROL 12 ML: 604.72 INJECTION INTRAVENOUS at 10:40

## 2019-10-17 NOTE — NURSING NOTE
Chief Complaint   Patient presents with     Blood Draw     Labs drawn via PIV by RN in lab. PIV removed after lab draw. Lab only appt.     Yary Fraser RN

## 2019-10-17 NOTE — DISCHARGE INSTRUCTIONS
MRI Contrast Discharge Instructions    The IV contrast you received today will pass out of your body in your  urine. This will happen in the next 24 hours. You will not feel this process.  Your urine will not change color.    Drink at least 4 extra glasses of water or juice today (unless your doctor  has restricted your fluids). This reduces the stress on your kidneys.  You may take your regular medicines.    If you are on dialysis: It is best to have dialysis today.    If you have a reaction: Most reactions happen right away. If you have  any new symptoms after leaving the hospital (such as hives or swelling),  call your hospital at the correct number below. Or call your family doctor.  If you have breathing distress or wheezing, call 911.    Special instructions: ***    I have read and understand the above information.    Signature:______________________________________ Date:___________    Staff:__________________________________________ Date:___________     Time:__________    [facility] Departments:    ___[place]: [phone]  ___[facility]: [phone]  ___[place]: [phone] ___[facility]: [phone]  ___[facility]: [phone]  ___[facility]: [phone]  ___[phone]  ___[facility]: [phone]  ___[phone]

## 2019-10-19 LAB — CGA SERPL-MCNC: 63 NG/ML (ref 0–160)

## 2019-10-25 NOTE — PROGRESS NOTES
Oncology Follow-up Visit:  Oct 28, 2019    Diagnosis: [id number] pancreatic head insulinoma.  No evidence of metastatic disease.     Treatment to date: Whipple resection December 8, 2016, by Dr. Matthias Armenta. Zero of 10 lymph nodes involved, grade 2, negative resection margins, Ki-67 equals 5%.  preoperative serum chromogranin A = 70 on 12/05/2016.     REFERRING PHYSICIAN:  Dr. Matthias Armenta, Hepatobiliary Surgery, [facility].      Oncology History:  Over a 2-3 year period, beginning in 2014 or so, he was having hypoglycemic episodes of unexplained origin.  He was even hospitalized for one such episode in the fall of 2016.  He met with a primary care physician, and subsequently an endocrinologist.  There was concern for hypoglycemic cause and concern for potential insulinoma.  On 10/31/2016, MRI at [facility] revealed a 3.9 x 3.7 x 3.6 cm mass in the head of the pancreas.  There was involvement of the superior mesenteric vein, but no evidence of metastatic disease noted.  He met with Dr. Migel Sena at [facility], and a referral was made for surgical evaluation.      On 11/11/2016, a CT abdomen and pelvis was performed revealing an enhancing lesion with a cystic component versus central necrosis in the head and the uncinate process of the pancreas.  There was an indeterminate portacaval lymph node measuring 2.1 cm.  Otherwise, no evidence of suspicious adenopathy in the abdomen.  EUS was performed by Dr. Guru Canas at the [facility] on 11/18/2016.  He performed an FNA.  This revealed the tumor was composed of grade 1 neuroendocrine tumor with a low proliferation marker of Ki-67 less than 2%.  The tumor was positive for synaptophysin, chromogranin and CD56.      On 12/01/2016, the patient underwent a preoperative octreotide scan that did not show any evidence of metastatic disease or tumor.  There was no radiotracer uptake.  A baseline chromogranin A was 70.      His C peptide  at baseline was 0.7.  Insulin level at the preoperative baseline was 2.2 and 3.5.  Proinsulin levels were less than 7.5, and a subsequent level was 9.6.     On 12/05/2016, the patient met with Dr. Matthias Armenta, who took the patient to the operating room on 12/08/2016 to perform a Whipple.  The pancreaticoduodenectomy resulted in the revealing of a pancreatic neuroendocrine tumor at the head of the pancreas that measured 5.1 cm with intermediate grade G2.  Margins were negative by 0.1 cm.  Zero of 12 lymph nodes were involved.  The Ki-67 registered at 5% with 4 mitoses/10 high-power fields.  There was no perivascular, nor perineural invasion.  The pathologic stage was T2.  The pathologic N stage was N0, and M0 per the prior octreotide scan.  The patient is currently 9 weeks out from surgery.    2/6/17 - - medical oncology consultation, Dr. Hanley.  6/5/17 - - MRI abdomen: IMPRESSION:   1. Postsurgical of Whipple procedure evidence of locally recurrent  neuroendocrine tumor.  2. 1.2 cm ill-defined hepatic lesion in the central liver. This is  indeterminate and could represent sequela of recent episode of  inflammation or infection. However, it is new from prior and  metastases are not excluded. Consider short-term follow-up MRI.  3. Mildly increased size of pericaval lymph node and unchanged mildly  enlarged randy hepatis lymph node, indeterminate but favored to be  reactive.  6/9/17 - - oncology follow-up, Dr. Hanley.  9/12/17 - - endocrine consultation, Dr. Ortiz. Assessment of insulinoma: recommendation to follow Pro-insulin is a potential useful tumor marker for recurrence, to be followed by endocrinology, in context of glucose and C-peptide levels.  9/12/17 - - MRI abdomen: IMPRESSION: In this patient with history of pancreatic head  insulinoma, post Whipple resection there is no evidence for metastatic  disease within the abdomen. Previously described lesion in the central  liver near the IVC is not well  characterized in the present study.  9/15/17 - - oncology follow-up, Dr. Hanley.  1/16/18 - - MRI abdomen: IMPRESSION:   In this patient with history of insulinoma in the pancreatic head  status post Whipple procedure:  1. No evidence of metastatic disease within the abdomen.  2. Mild pancreatic duct ectasia measuring up to 6 mm. This is  unchanged to minimally increased when compared to the prior exam and  of uncertain clinical significance. No significant associated T1  abnormality to suggest pancreatitis.  1/19/18 - - oncology follow-up, Dr. Hanley.  5/10/18 - - MRI abdomen IMPRESSION: In this patient with history of neuroendocrine tumor of  the pancreas and subsequent Whipple procedure:  1. Slightly increased size of a precaval lymph node, currently  measuring 1.5 x 2.0 cm comparison measured 1.3 x 1.6 cm on 1/16/2018  exam. This is indeterminate  2. Mild persistent dilatation of the main pancreatic duct measuring 6mm.    5/14/18 - - oncology follow-up, Dr. Hanley.  6/12/18 - - patient was no-show for endocrinology visit with Dr. Ortiz.  10/18/18 - - MRI abdomen: IMPRESSION:   1. In this patient with history of pancreatic insulinoma status post  Whipple procedure, there is no evidence of locally recurrent or  metastatic disease.  2. Stable prominent portacaval lymph node.  10/22/18 - - oncology follow-up, Dr. Hanley.  4/18/19 - - MRI abdomen: IMPRESSION: Postoperative changes from Whipple procedure for  insulinoma without evidence of recurrent disease in the abdomen.  4/22/19 - - oncology follow-up, Dr. Hanley.    10/17/19--MRI abdomen IMPRESSION:   1-Stable Postoperative changes from Whipple procedure for insulinoma  without evidence of recurrent disease in the abdomen.   2-Stable Oliva hernia containing nondilated short segment of small  bowel.   10/28/19--oncology follow-up, Dr. Hanley.         Interim History/History Of Present Illness:  Mr. Joey Guerrero is a 44 year old  man with pancreatic head insulinoma  resected by Dr. Armenta on December 8, 2016.    He returns today with his wife.  He is almost 3 years out as of this December from his pancreatic head insulinoma resection by Dr. Armenta.  He is generally feeling well.  He has not had any symptoms that would be consistent with elevated insulin/hyperinsulinemia.  He is denying, for example, any dizziness, weakness, any blurred vision.  He has occasional migraine headaches that long predate the diagnosis of insulinoma and those are sometimes associated with blurred vision, but he is not having them.  He still works at the 's office [employer] and he has a more stable schedule working at the local [employer].  He denies any symptomatic complaints.  He had a followup MRI abdomen last week on 10/17.  There is no evidence of recurrent disease.  There is a stable ventral hernia, but he did not have any evidence of symptoms from that herniation.  His chromogranin A is relatively stable as well.         Results for YESENIA TORRES (MRN [id number]) as of 10/25/2019 15:04   Ref. Range 1/16/2018 11:48 5/10/2018 10:39 10/18/2018 12:36 4/18/2019 13:20 10/17/2019 11:35   Chromogranin A Latest Ref Range: 0 - 160 ng/mL 77 53 55 52 63         Review Of Systems:  Full 12-point ROS reviewed. Pertinent symptoms reviewed above per HPI.    Past medical, social, surgical, and family histories reviewed.     PAST MEDICAL HISTORY:  Pancreatic neuroendocrine tumor, pathologic T2 N0 M0 per above, status post Whipple surgery on 12/08/2016.  Other surgeries notable include an appendectomy in 2007.      FAMILY HISTORY:  Denies any family history of malignancy.      SOCIAL HISTORY:  The patient lives in [place], [place], with his wife.  They have 2 boys, ages 8 and 4.  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    Tobacco:  Used to chew and smoke occasional cigarettes since the age of 18 until recently.    Alcohol:  Denies alcohol abuse.    Drugs:  Denies illicit drug use.  "    Allergies:  Allergies as of 10/28/2019 - Reviewed 04/22/2019   Allergen Reaction Noted     Cat hair extract  12/05/2016     Pollen extract  12/05/2016       Current Medications:  Current Outpatient Medications   Medication Sig Dispense Refill     albuterol (PROAIR HFA, PROVENTIL HFA, VENTOLIN HFA) 108 (90 BASE) MCG/ACT inhaler Inhale 1-2 puffs into the lungs every 6 hours as needed for shortness of breath / dyspnea or wheezing       amylase-lipase-protease (CREON 6) 6000 units CPEP Take 1-2 capsules (6,000-12,000 Units) by mouth 3 times daily (with meals) 450 capsule 3     Ascorbic Acid (VITAMIN C PO) Take by mouth 2 times daily        ASPIRIN PO Take 81 mg by mouth       Cholecalciferol (VITAMIN D-3 PO) Take by mouth daily Pt unsure of dosage       fluticasone (FLOVENT HFA) 110 MCG/ACT inhaler Inhale 2 puffs into the lungs At Bedtime        Multiple Vitamin (MULTI-VITAMINS) TABS        Omega-3 Fatty Acids (OMEGA-3 FISH OIL PO) Take 1 g by mouth 2 times daily (with meals) 3 capsules       Probiotic Product (PROBIOTIC ADVANCED) CAPS Take by mouth daily       vitamin B complex with vitamin C (VITAMIN  B COMPLEX) TABS tablet Take 1 tablet by mouth daily          Physical Exam:  BP (!) 141/88 (BP Location: Right arm, Patient Position: Chair, Cuff Size: Adult Regular)   Pulse 69   Resp 16   Ht 1.88 m (6' 2.02\")   Wt 113.4 kg (250 lb)   SpO2 98%   BMI 32.08 kg/m         GENERAL:  Very pleasant, robust,  gentleman in no acute distress, alert and oriented x3  HEENT:  Anicteric sclerae.  Oropharynx is clear without mucositis or thrush.   CARDIOVASCULAR:  Regular rate and rhythm.  Normal S1, S2.  No murmurs, gallops or rubs.   LUNGS:  Clear to auscultation bilaterally.   ABDOMEN:  Soft, non tender and non distended.  The midline surgical scar has well-healed.    EXTREMITIES:  No edema noted.       Laboratory/Imaging Studies.  Results for YESENIA TORRES (MRN [id number]) as of 10/25/2019 15:04   Ref. " Range 1/16/2018 11:48 5/10/2018 10:39 10/18/2018 12:36 4/18/2019 13:20 10/17/2019 11:35   Chromogranin A Latest Ref Range: 0 - 160 ng/mL 77 53 55 52 63       Results for orders placed or performed in visit on 10/17/19   Chromogranin A   Result Value Ref Range    Chromogranin A 63 0 - 160 ng/mL   Comprehensive metabolic panel   Result Value Ref Range    Sodium 138 133 - 144 mmol/L    Potassium 3.9 3.4 - 5.3 mmol/L    Chloride 106 94 - 109 mmol/L    Carbon Dioxide 26 20 - 32 mmol/L    Anion Gap 6 3 - 14 mmol/L    Glucose 94 70 - 99 mg/dL    Urea Nitrogen 26 7 - 30 mg/dL    Creatinine 0.95 0.66 - 1.25 mg/dL    GFR Estimate >90 >60 mL/min/[1.73_m2]    GFR Estimate If Black >90 >60 mL/min/[1.73_m2]    Calcium 8.8 8.5 - 10.1 mg/dL    Bilirubin Total 0.7 0.2 - 1.3 mg/dL    Albumin 3.8 3.4 - 5.0 g/dL    Protein Total 7.0 6.8 - 8.8 g/dL    Alkaline Phosphatase 30 (L) 40 - 150 U/L    ALT 79 (H) 0 - 70 U/L    AST 43 0 - 45 U/L         RADIOLOGY:   Reviewed the MRI scan as well as laboratory values in great detail with him today.  I printed out copies of the report that I provided to his wife and him at the end of the visit.         ASSESSMENT/PLAN:  44 year old man with pancreatic head insulinoma, who presented with hypoglycemic episodes for several years prior to diagnosis. He had Whipple surgery by Dr. Matthias Armenta on 12/08/2016 ([id number]).     He is nearly THREE years out from his definitive surgery performed by Dr. Matthias Armenta for his pancreatic head insulinoma.  He has relatively low risk with relatively low Ki-67 index.  He had negative margins.  It was grade 2 at the time with negative lymph node involvement.  Chromogranin A markers have been unremarkable.     This December marks 3 years since his surgical resection by Dr. Matthias Armenta, and I think it would be appropriate to space out some of the frequency of his scans and followup at this point, especially in the absence of symptoms. I suggested a 6-9 month range of  followup for his next MRI abdomen and labs including chromogranin A and CMP that were done today.  I suggested that he continue annual followup with Dr. Ortiz, with whom he can help him schedule with to follow up in January or February 2020.  I reviewed all of the above to his stated satisfaction and understanding of his wife and the patient himself, and I advised him to call us in the interim should he develop any concerning symptoms.       I spent 30 minutes in consultation, including H&P and Discussion. >50% of time was spent in counseling and in coordination of care.    Brandan Hanley MD, PhD

## 2019-10-28 ENCOUNTER — ONCOLOGY VISIT (OUTPATIENT)
Dept: ONCOLOGY | Facility: CLINIC | Age: 44
End: 2019-10-28
Attending: INTERNAL MEDICINE
Payer: COMMERCIAL

## 2019-10-28 VITALS
HEART RATE: 69 BPM | DIASTOLIC BLOOD PRESSURE: 88 MMHG | HEIGHT: 74 IN | OXYGEN SATURATION: 98 % | RESPIRATION RATE: 16 BRPM | WEIGHT: 250 LBS | BODY MASS INDEX: 32.08 KG/M2 | SYSTOLIC BLOOD PRESSURE: 141 MMHG

## 2019-10-28 DIAGNOSIS — D13.7 INSULINOMA: Primary | ICD-10-CM

## 2019-10-28 PROCEDURE — G0463 HOSPITAL OUTPT CLINIC VISIT: HCPCS | Mod: ZF

## 2019-10-28 PROCEDURE — 99214 OFFICE O/P EST MOD 30 MIN: CPT | Mod: ZP | Performed by: INTERNAL MEDICINE

## 2019-10-28 ASSESSMENT — PAIN SCALES - GENERAL: PAINLEVEL: NO PAIN (0)

## 2019-10-28 ASSESSMENT — MIFFLIN-ST. JEOR: SCORE: 2083.99

## 2019-10-28 NOTE — LETTER
RE: Joey Guerrero  [address]  [facility] [address]     Dear Colleague,    Thank you for referring your patient, Joey Guerrero, to the [facility]. Please see a copy of my visit note below.    Oncology Follow-up Visit:  Oct 28, 2019    Diagnosis: [id number] pancreatic head insulinoma.  No evidence of metastatic disease.     Treatment to date: Whipple resection December 8, 2016, by Dr. Matthias Armenta. Zero of 10 lymph nodes involved, grade 2, negative resection margins, Ki-67 equals 5%.  preoperative serum chromogranin A = 70 on 12/05/2016.     REFERRING PHYSICIAN:  Dr. Matthias Armenta, Hepatobiliary Surgery, [facility].      Oncology History:  Over a 2-3 year period, beginning in 2014 or so, he was having hypoglycemic episodes of unexplained origin.  He was even hospitalized for one such episode in the fall of 2016.  He met with a primary care physician, and subsequently an endocrinologist.  There was concern for hypoglycemic cause and concern for potential insulinoma.  On 10/31/2016, MRI at [facility] revealed a 3.9 x 3.7 x 3.6 cm mass in the head of the pancreas.  There was involvement of the superior mesenteric vein, but no evidence of metastatic disease noted.  He met with Dr. Migel Sena at [facility], and a referral was made for surgical evaluation.      On 11/11/2016, a CT abdomen and pelvis was performed revealing an enhancing lesion with a cystic component versus central necrosis in the head and the uncinate process of the pancreas.  There was an indeterminate portacaval lymph node measuring 2.1 cm.  Otherwise, no evidence of suspicious adenopathy in the abdomen.  EUS was performed by Dr. Guru Canas at the [facility] on 11/18/2016.  He performed an FNA.  This revealed the tumor was composed of grade 1 neuroendocrine tumor with a low proliferation marker of Ki-67 less than 2%.  The tumor was positive for synaptophysin, chromogranin and CD56.      On  12/01/2016, the patient underwent a preoperative octreotide scan that did not show any evidence of metastatic disease or tumor.  There was no radiotracer uptake.  A baseline chromogranin A was 70.      His C peptide at baseline was 0.7.  Insulin level at the preoperative baseline was 2.2 and 3.5.  Proinsulin levels were less than 7.5, and a subsequent level was 9.6.     On 12/05/2016, the patient met with Dr. Matthias Armenta, who took the patient to the operating room on 12/08/2016 to perform a Whipple.  The pancreaticoduodenectomy resulted in the revealing of a pancreatic neuroendocrine tumor at the head of the pancreas that measured 5.1 cm with intermediate grade G2.  Margins were negative by 0.1 cm.  Zero of 12 lymph nodes were involved.  The Ki-67 registered at 5% with 4 mitoses/10 high-power fields.  There was no perivascular, nor perineural invasion.  The pathologic stage was T2.  The pathologic N stage was N0, and M0 per the prior octreotide scan.  The patient is currently 9 weeks out from surgery.    2/6/17 - - medical oncology consultation, Dr. Hanley.  6/5/17 - - MRI abdomen: IMPRESSION:   1. Postsurgical of Whipple procedure evidence of locally recurrent  neuroendocrine tumor.  2. 1.2 cm ill-defined hepatic lesion in the central liver. This is  indeterminate and could represent sequela of recent episode of  inflammation or infection. However, it is new from prior and  metastases are not excluded. Consider short-term follow-up MRI.  3. Mildly increased size of pericaval lymph node and unchanged mildly  enlarged randy hepatis lymph node, indeterminate but favored to be  reactive.  6/9/17 - - oncology follow-up, Dr. Hanley.  9/12/17 - - endocrine consultation, Dr. Ortiz. Assessment of insulinoma: recommendation to follow Pro-insulin is a potential useful tumor marker for recurrence, to be followed by endocrinology, in context of glucose and C-peptide levels.  9/12/17 - - MRI abdomen: IMPRESSION: In this patient  with history of pancreatic head  insulinoma, post Whipple resection there is no evidence for metastatic  disease within the abdomen. Previously described lesion in the central  liver near the IVC is not well characterized in the present study.  9/15/17 - - oncology follow-up, Dr. Hanley.  1/16/18 - - MRI abdomen: IMPRESSION:   In this patient with history of insulinoma in the pancreatic head  status post Whipple procedure:  1. No evidence of metastatic disease within the abdomen.  2. Mild pancreatic duct ectasia measuring up to 6 mm. This is  unchanged to minimally increased when compared to the prior exam and  of uncertain clinical significance. No significant associated T1  abnormality to suggest pancreatitis.  1/19/18 - - oncology follow-up, Dr. Hanley.  5/10/18 - - MRI abdomen IMPRESSION: In this patient with history of neuroendocrine tumor of  the pancreas and subsequent Whipple procedure:  1. Slightly increased size of a precaval lymph node, currently  measuring 1.5 x 2.0 cm comparison measured 1.3 x 1.6 cm on 1/16/2018  exam. This is indeterminate  2. Mild persistent dilatation of the main pancreatic duct measuring 6mm.    5/14/18 - - oncology follow-up, Dr. Hanley.  6/12/18 - - patient was no-show for endocrinology visit with Dr. Ortiz.  10/18/18 - - MRI abdomen: IMPRESSION:   1. In this patient with history of pancreatic insulinoma status post  Whipple procedure, there is no evidence of locally recurrent or  metastatic disease.  2. Stable prominent portacaval lymph node.  10/22/18 - - oncology follow-up, Dr. Hanley.  4/18/19 - - MRI abdomen: IMPRESSION: Postoperative changes from Whipple procedure for  insulinoma without evidence of recurrent disease in the abdomen.  4/22/19 - - oncology follow-up, Dr. Hanley.    10/17/19--MRI abdomen IMPRESSION:   1-Stable Postoperative changes from Whipple procedure for insulinoma  without evidence of recurrent disease in the abdomen.   2-Stable Oliva hernia containing nondilated  short segment of small  bowel.   10/28/19--oncology follow-up, Dr. Hanley.         Interim History/History Of Present Illness:  Mr. Yesenia Torres is a 44 year old  man with pancreatic head insulinoma resected by Dr. Armneta on December 8, 2016.    He returns today with his wife.  He is almost 3 years out as of this December from his pancreatic head insulinoma resection by Dr. Armenta.  He is generally feeling well.  He has not had any symptoms that would be consistent with elevated insulin/hyperinsulinemia.  He is denying, for example, any dizziness, weakness, any blurred vision.  He has occasional migraine headaches that long predate the diagnosis of insulinoma and those are sometimes associated with blurred vision, but he is not having them.  He still works at the 's office [employer] and he has a more stable schedule working at the local [employer].  He denies any symptomatic complaints.  He had a followup MRI abdomen last week on 10/17.  There is no evidence of recurrent disease.  There is a stable ventral hernia, but he did not have any evidence of symptoms from that herniation.  His chromogranin A is relatively stable as well.         Results for YESENIA TORRES (MRN [id number]) as of 10/25/2019 15:04   Ref. Range 1/16/2018 11:48 5/10/2018 10:39 10/18/2018 12:36 4/18/2019 13:20 10/17/2019 11:35   Chromogranin A Latest Ref Range: 0 - 160 ng/mL 77 53 55 52 63         Review Of Systems:  Full 12-point ROS reviewed. Pertinent symptoms reviewed above per HPI.    Past medical, social, surgical, and family histories reviewed.     PAST MEDICAL HISTORY:  Pancreatic neuroendocrine tumor, pathologic T2 N0 M0 per above, status post Whipple surgery on 12/08/2016.  Other surgeries notable include an appendectomy in 2007.      FAMILY HISTORY:  Denies any family history of malignancy.      SOCIAL HISTORY:  The patient lives in [place], [place], with his wife.  They have 2 boys, ages 8 and 4.  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    Tobacco:  Used to chew and smoke occasional cigarettes since the age of 18 until recently.    Alcohol:  Denies alcohol abuse.    Drugs:  Denies illicit drug use.     Allergies:  Allergies as of 10/28/2019 - Reviewed 04/22/2019   Allergen Reaction Noted     Cat hair extract  12/05/2016     Pollen extract  12/05/2016       Current Medications:  Current Outpatient Medications   Medication Sig Dispense Refill     albuterol (PROAIR HFA, PROVENTIL HFA, VENTOLIN HFA) 108 (90 BASE) MCG/ACT inhaler Inhale 1-2 puffs into the lungs every 6 hours as needed for shortness of breath / dyspnea or wheezing       amylase-lipase-protease (CREON 6) 6000 units CPEP Take 1-2 capsules (6,000-12,000 Units) by mouth 3 times daily (with meals) 450 capsule 3     Ascorbic Acid (VITAMIN C PO) Take by mouth 2 times daily        ASPIRIN PO Take 81 mg by mouth       Cholecalciferol (VITAMIN D-3 PO) Take by mouth daily Pt unsure of dosage       fluticasone (FLOVENT HFA) 110 MCG/ACT inhaler Inhale 2 puffs into the lungs At Bedtime        Multiple Vitamin (MULTI-VITAMINS) TABS        Omega-3 Fatty Acids (OMEGA-3 FISH OIL PO) Take 1 g by mouth 2 times daily (with meals) 3 capsules       Probiotic Product (PROBIOTIC ADVANCED) CAPS Take by mouth daily       vitamin B complex with vitamin C (VITAMIN  B COMPLEX) TABS tablet Take 1 tablet by mouth daily          Physical Exam:  BP (!) 141/88 (BP Location: Right arm, Patient Position: Chair, Cuff Size: Adult Regular)   Pulse 69   Resp 16   Ht 1.88 m (6' 2.02\")   Wt 113.4 kg (250 lb)   SpO2 98%   BMI 32.08 kg/m          GENERAL:  Very pleasant, robust,  gentleman in no acute distress, alert and oriented x3  HEENT:  Anicteric sclerae.  Oropharynx is clear without mucositis or thrush.   CARDIOVASCULAR:  Regular rate and rhythm.  Normal S1, S2.  No murmurs, gallops or rubs.   LUNGS:  Clear to auscultation bilaterally.   ABDOMEN:  Soft, non tender and " non distended.  The midline surgical scar has well-healed.    EXTREMITIES:  No edema noted.       Laboratory/Imaging Studies.  Results for YESENIA TORRES (MRN [id number]) as of 10/25/2019 15:04   Ref. Range 1/16/2018 11:48 5/10/2018 10:39 10/18/2018 12:36 4/18/2019 13:20 10/17/2019 11:35   Chromogranin A Latest Ref Range: 0 - 160 ng/mL 77 53 55 52 63       Results for orders placed or performed in visit on 10/17/19   Chromogranin A   Result Value Ref Range    Chromogranin A 63 0 - 160 ng/mL   Comprehensive metabolic panel   Result Value Ref Range    Sodium 138 133 - 144 mmol/L    Potassium 3.9 3.4 - 5.3 mmol/L    Chloride 106 94 - 109 mmol/L    Carbon Dioxide 26 20 - 32 mmol/L    Anion Gap 6 3 - 14 mmol/L    Glucose 94 70 - 99 mg/dL    Urea Nitrogen 26 7 - 30 mg/dL    Creatinine 0.95 0.66 - 1.25 mg/dL    GFR Estimate >90 >60 mL/min/[1.73_m2]    GFR Estimate If Black >90 >60 mL/min/[1.73_m2]    Calcium 8.8 8.5 - 10.1 mg/dL    Bilirubin Total 0.7 0.2 - 1.3 mg/dL    Albumin 3.8 3.4 - 5.0 g/dL    Protein Total 7.0 6.8 - 8.8 g/dL    Alkaline Phosphatase 30 (L) 40 - 150 U/L    ALT 79 (H) 0 - 70 U/L    AST 43 0 - 45 U/L       RADIOLOGY:   Reviewed the MRI scan as well as laboratory values in great detail with him today.  I printed out copies of the report that I provided to his wife and him at the end of the visit.       ASSESSMENT/PLAN:  44 year old man with pancreatic head insulinoma, who presented with hypoglycemic episodes for several years prior to diagnosis. He had Whipple surgery by Dr. Matthias Armenta on 12/08/2016 ([id number]).     He is nearly THREE years out from his definitive surgery performed by Dr. Matthias Armenta for his pancreatic head insulinoma.  He has relatively low risk with relatively low Ki-67 index.  He had negative margins.  It was grade 2 at the time with negative lymph node involvement.  Chromogranin A markers have been unremarkable.     This December marks 3 years since his surgical resection by Dr. Rizzo  Geovany, and I think it would be appropriate to space out some of the frequency of his scans and followup at this point, especially in the absence of symptoms. I suggested a 6-9 month range of followup for his next MRI abdomen and labs including chromogranin A and CMP that were done today.  I suggested that he continue annual followup with Dr. Ortiz, with whom he can help him schedule with to follow up in January or February 2020.  I reviewed all of the above to his stated satisfaction and understanding of his wife and the patient himself, and I advised him to call us in the interim should he develop any concerning symptoms.     I spent 30 minutes in consultation, including H&P and Discussion. >50% of time was spent in counseling and in coordination of care.    Brandan Hanley MD, PhD

## 2019-10-28 NOTE — NURSING NOTE
"Oncology Rooming Note    October 28, 2019 10:26 AM   Joey Guerrero is a 44 year old male who presents for:    Chief Complaint   Patient presents with     Oncology Clinic Visit     [facility] RETURN- NEUROENDOCRINE TUMOR     Initial Vitals: BP (!) 141/88 (BP Location: Right arm, Patient Position: Chair, Cuff Size: Adult Regular)   Pulse 69   Resp 16   Ht 1.88 m (6' 2.02\")   Wt 113.4 kg (250 lb)   SpO2 98%   BMI 32.08 kg/m   Estimated body mass index is 32.08 kg/m  as calculated from the following:    Height as of this encounter: 1.88 m (6' 2.02\").    Weight as of this encounter: 113.4 kg (250 lb). Body surface area is 2.43 meters squared.  No Pain (0) Comment: Data Unavailable   No LMP for male patient.  Allergies reviewed: Yes  Medications reviewed: Yes    Medications: Medication refills not needed today.  Pharmacy name entered into thereNow:    [facility] [id number] [place], [place] - [address] [address] [id number] [place], [place] - [address] AT [facility] [address]    Clinical concerns: No new concerns. Dayan was notified.      Zach Lombardi LPN            "

## 2020-01-06 ENCOUNTER — OFFICE VISIT (OUTPATIENT)
Dept: ENDOCRINOLOGY | Facility: CLINIC | Age: 45
End: 2020-01-06
Payer: COMMERCIAL

## 2020-01-06 VITALS
DIASTOLIC BLOOD PRESSURE: 84 MMHG | WEIGHT: 266.9 LBS | BODY MASS INDEX: 32.5 KG/M2 | HEART RATE: 64 BPM | SYSTOLIC BLOOD PRESSURE: 128 MMHG | HEIGHT: 76 IN

## 2020-01-06 DIAGNOSIS — R73.03 PRE-DIABETES: Primary | ICD-10-CM

## 2020-01-06 DIAGNOSIS — D13.7 INSULINOMA: ICD-10-CM

## 2020-01-06 DIAGNOSIS — D49.0 PANCREATIC TUMOR: ICD-10-CM

## 2020-01-06 ASSESSMENT — MIFFLIN-ST. JEOR: SCORE: 2192.15

## 2020-01-06 ASSESSMENT — PAIN SCALES - GENERAL: PAINLEVEL: NO PAIN (0)

## 2020-01-06 NOTE — PATIENT INSTRUCTIONS
Fasting morning labs at your earliest convenience     It can be drawn in any [place] lab.

## 2020-01-06 NOTE — LETTER
1/6/2020       RE: Joey Guerrero  [address]  [place] [address]     Dear Colleague,    Thank you for referring your patient, Joey Guerrero, to the [facility]CR[facility]. Please see a copy of my visit note below.    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 Tumor was 5.1 cm, not pathologically  malignant (as defined by distant mets, vascular or capsular invasion).  This does not fully exclude malignancy.   pT2, pNo, pMx, stage IB assuming no distant mets.   There were no elevated  markers measured prior to surgery , which will help us detect recurrence, other than the hypoglycemia/ hyperinsulinemia The primary tumor uptake of octreotide was not zero but it appeared quite low on the octreoscan. The proinsulin seemed to be the same with fasting and non-fasting.  He no longer has symptoms suggestive of hypoglycemia.  I have noted that think that the proinsulin might be a useful tumor marker for him.  We will follow this (always in the context of glucose and c peptide)  Fasting labs.     S/p Whipple procedure -   on treatment of pancreatic exocrine replacement. This may be a risk for future diabetes development, depending on the volume of pancreatic tissue that was actually removed.      Pre-diabetes in the past.  Repeat A1c    Obesity by BMI.  His weight is up from jennifer 230 9/17 but still lower than 283 prior to surgery for # 1.    Lifestyle to maintain weight or to lose a small amount would be ideal given the pre-diabetes.      Hilda Ortiz MD      Cc/HISTORY OF PRESENT ILLNESS  43- year old man presentsfor follow up of history of insulinoma. I last saw him 1/28/19. He continues to follow regularly with Dr Hanley in Oncology, where he was last seen 10/28/19.  The yhave continued with frequent imaging, most recent MRI 10/17/19.      See my  5/10/17 note for description of the presentation and diagnostic tests     His treatment course has been as follows:  11/18/16 FNA -EUS pancreatic head mass was positive for malignancy - neuroendocrine tumor.    12/8/16  open nonpylorus preserving Whipple procedure with cholecystectomy and feeding jejunostomy removing  pancreatic neuroendocrine tumor (5.1 cm) , intermediate grade (G2).  0/10 LN negative.  KI -67 in some areas of tumor up to 5% ( the synoptic states there were 10 mitoses /10 HPF, no LV invasion.  No IHC was performed .    Weights  11/18/16 283  9/12/17: 230  1/19/18: 247  1/28/19: 268  Today: 266    Since the surgery he is no longer having the spells .  He eats 3-4 times/day, has no problem skipping meals.  His weight is relatively stable compared with last year.      We have been following neuroendocrine tumor markers as follows  6/5/17 :CGA 53, glucose 90  9/12/17: proinsulin < 1.6, C peptide 0.5, HgbA1c 5.1, CGA 62, glucose 88  1/16/18: proinsulin 2.1, C peptide 1.0, CGA 77  5/10/18: CGA 53, glucose 104  10/16/18: CGA 55, glucose 100  1/28/19: proinsulin 3.1 pM (< 8.0), C peptide 1.1, glucose 96,  HgbA1c 6.2% - followed appt,   10/17/19: CGA 63    We have the following imaging studies:   10/31/16: MRI abdomen pancreas ([facility]): 3.9 cm mass pancreas with central necrosis. Mass encases 50% of adjacent SMV which remains patent. - Liver unremarkable.   11/11/16 CT abdomen: enhancing lesion with cystic component or central necrosis within head and uncinate process of pancreas concerning for malignancy.  1 cm indeterminate portacaval LN.   12/1/16 octreoscan: read as negative; I think the study shows very faint activity at the pancreatic mass site.  6/5/17 MRI abdomen: ? Liver mass  4/18/10 and 10/17/19 MRI abdomen: KIKO      REVIEW OF SYSTEMS  Energy good  Sleep OK  Cardiac: negative  Respiratory: negative  GI: negtive  Occasional headache  No sweating    Past Medical History  Past Medical  History:   Diagnosis Date     Hypoglycemia     due to insulinoma     Migraine      Pancreatic neuroendocrine tumor- insulinoma 2016     Ruptured appendix 2003     Tinea versicolor      Uncomplicated asthma         Past Surgical History:   Procedure Laterality Date     APPENDECTOMY       ENDOSCOPIC ULTRASOUND UPPER GASTROINTESTINAL TRACT (GI) N/A 11/18/2016    Procedure: ENDOSCOPIC ULTRASOUND, ESOPHAGOSCOPY / UPPER GASTROINTESTINAL TRACT (GI);  Surgeon: Guru Malathi Canas MD;  Location: [facility] OR     PICC INSERTION       WHIPPLE PROCEDURE N/A 12/8/2016    Procedure: WHIPPLE PROCEDURE;  Surgeon: Matthias Armenta MD;  Location: [facility] OR       Medications  Current Outpatient Medications   Medication     albuterol (PROAIR HFA, PROVENTIL HFA, VENTOLIN HFA) 108 (90 BASE) MCG/ACT inhaler     amylase-lipase-protease (CREON 6) 6000 units CPEP     Ascorbic Acid (VITAMIN C PO)     ASPIRIN PO     Cholecalciferol (VITAMIN D-3 PO)     fluticasone (FLOVENT HFA) 110 MCG/ACT inhaler     Multiple Vitamin (MULTI-VITAMINS) TABS     Omega-3 Fatty Acids (OMEGA-3 FISH OIL PO)     Probiotic Product (PROBIOTIC ADVANCED) CAPS     vitamin B complex with vitamin C (VITAMIN  B COMPLEX) TABS tablet     No current facility-administered medications for this visit.        Current Outpatient Medications   Medication Sig Dispense Refill     albuterol (PROAIR HFA, PROVENTIL HFA, VENTOLIN HFA) 108 (90 BASE) MCG/ACT inhaler Inhale 1-2 puffs into the lungs every 6 hours as needed for shortness of breath / dyspnea or wheezing       amylase-lipase-protease (CREON 6) 6000 units CPEP Take 1-2 capsules (6,000-12,000 Units) by mouth 3 times daily (with meals) 450 capsule 3     Ascorbic Acid (VITAMIN C PO) Take by mouth 2 times daily        ASPIRIN PO Take 81 mg by mouth       Cholecalciferol (VITAMIN D-3 PO) Take by mouth daily Pt unsure of dosage       fluticasone (FLOVENT HFA) 110 MCG/ACT inhaler Inhale 2 puffs into the lungs At Bedtime         "Multiple Vitamin (MULTI-VITAMINS) TABS        Omega-3 Fatty Acids (OMEGA-3 FISH OIL PO) Take 1 g by mouth 2 times daily (with meals) 3 capsules       Probiotic Product (PROBIOTIC ADVANCED) CAPS Take by mouth daily       vitamin B complex with vitamin C (VITAMIN  B COMPLEX) TABS tablet Take 1 tablet by mouth daily       Allergies  Allergies   Allergen Reactions     Cat Hair Extract      Runny Nose     Pollen Extract      Runny Nose     Family History  Family History   Problem Relation Age of Onset     Thyroid Disease Sister      Pancreatic Cancer No family hx of      Nephrolithiasis No family hx of      Brain Tumor No family hx of      Diabetes No family hx of      Social History  Social History     Tobacco Use     Smoking status: Never Smoker     Smokeless tobacco: Current User     Types: Chew     Tobacco comment: 1/2 can daily   Substance Use Topics     Alcohol use: Yes     Comment: 6-7 beers every 2-3 weeks     Drug use: No      .  ; eats 3-4 times/day during the day    Physical Exam  /84   Pulse 64   Ht 1.93 m (6' 4\")   Wt 121.1 kg (266 lb 14.4 oz)   BMI 32.49 kg/m     Body mass index is 32.49 kg/m .  GENERAL :  Large man In no apparent distress  SKIN: Normal color, normal temperature, texture.  No  purple striae.     EYES: PERRL, EOMI, No scleral icterus,  No proptosis, conjunctival redness, stare, retraction  MOUTH: Moist, pink; pharynx clear  NECK: No visible masses. No palpable adenopathy, or masses. No carotid bruits.   THYROID:  Not palpable  RESP: Lungs clear to auscultation bilaterally  CARDIAC: Regular rate and rhythm, normal S1 S2, without murmurs, rubs or gallops    ABDOMEN: Normal bowel sounds; soft, nontender, no HSM or masses       NEURO: awake, alert, responds appropriately to questions.  Cranial nerves intact.  Moves all extremities; Gait normal.  No tremor of the outstretched hand.  DTRs   1/4 ,   EXTREMITIES: No clubbing, cyanosis or edema.    DATA REVIEW    Results for " YESENIA TORRES (MRN [id number]) as of 1/6/2020 13:40   Ref. Range 10/17/2019 11:35   Sodium Latest Ref Range: 133 - 144 mmol/L 138   Potassium Latest Ref Range: 3.4 - 5.3 mmol/L 3.9   Chloride Latest Ref Range: 94 - 109 mmol/L 106   Carbon Dioxide Latest Ref Range: 20 - 32 mmol/L 26   Urea Nitrogen Latest Ref Range: 7 - 30 mg/dL 26   Creatinine Latest Ref Range: 0.66 - 1.25 mg/dL 0.95   GFR Estimate Latest Ref Range: >60 mL/min/1.73_m2 >90   GFR Estimate If Black Latest Ref Range: >60 mL/min/1.73_m2 >90   Calcium Latest Ref Range: 8.5 - 10.1 mg/dL 8.8   Anion Gap Latest Ref Range: 3 - 14 mmol/L 6   Albumin Latest Ref Range: 3.4 - 5.0 g/dL 3.8   Protein Total Latest Ref Range: 6.8 - 8.8 g/dL 7.0   Bilirubin Total Latest Ref Range: 0.2 - 1.3 mg/dL 0.7   Alkaline Phosphatase Latest Ref Range: 40 - 150 U/L 30 (L)   ALT Latest Ref Range: 0 - 70 U/L 79 (H)   AST Latest Ref Range: 0 - 45 U/L 43   Chromogranin A Latest Ref Range: 0 - 160 ng/mL 63   Glucose Latest Ref Range: 70 - 99 mg/dL 94     MRCP Without and With Contrast  CLINICAL HISTORY: Insulinoma  DATE: 10/17/2019 11:37 AM  TECHNIQUE:   Images were acquired with and without intravenous gadolinium contrast through the upper abdomen. The following MR images were acquired without intravenous contrast: TrueFISP, multiplanar T2-weighted, axial T1 in/out of phase, T2-weighted MRCP images, axial diffusion-weighted and axial apparent diffusion coefficient. T1-weighted images were  obtained before contrast at the multiple time points following contrast injection. 3-D reformatted images were generated by the technologist.Contrast dose: 12.0mL Gadavist   Comparison study: 4/18/2019, 10/18/2018, 5/10/2018, 11/16/2018, 6/5/2017, 11/11/2016  FINDINGS:  Biliary Tree: No extrahepatic or intrahepatic biliary duct dilatation. Postoperative changes of choledochojejunostomy. The right posterior duct appears to drain into the left duct, normal variant.   Pancreas: Stable postoperative  changes of Whipple procedure with prominent main pancreatic duct (main pancreatic duct measures up to 6mm). No focal mass identified.   Liver: No evidence of hepatic steatosis or iron deposition. Nosuspicious focal hepatic lesion.  Gallbladder: Surgically absent.  Spleen: Normal  Kidneys: Normal  Adrenal glands: Normal  Bowel: No abnormally dilated loops of small bowel or colon.  Lymph nodes: Stable enlarged portocaval lymph node (best seen on series 23, image 19). No new lymphadenopathy.  Blood vessels: Major intra-abdominal vessels are patent.  Lung bases: Clear.  Bones and soft tissues: Visualized enhancing foci in bilateral  posterior iliac bones are unchanged since 1/6/2018 (for example as  seen on series 26, image 12).     Mesentery and abdominal wall: Stable right para midline Oliva hernia  containing nondilated short segment of small bowel. Postsurgical  changes involving the ventral abdominal wall.   Ascites: None                                                                 IMPRESSION:   1-Stable Postoperative changes from Whipple procedure for insulinoma without evidence of recurrent disease in the abdomen.   2-Stable Oliva hernia containing nondilated short segment of small bowel.        I have personally reviewed the examination and initial interpretation  and I agree with the findings.         Again, thank you for allowing me to participate in the care of your patient.      Sincerely,    Eva Ortiz MD

## 2020-01-06 NOTE — PROGRESS NOTES
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 Tumor was 5.1 cm, not pathologically  malignant (as defined by distant mets, vascular or capsular invasion).  This does not fully exclude malignancy.   pT2, pNo, pMx, stage IB assuming no distant mets.   There were no elevated  markers measured prior to surgery , which will help us detect recurrence, other than the hypoglycemia/ hyperinsulinemia The primary tumor uptake of octreotide was not zero but it appeared quite low on the octreoscan. The proinsulin seemed to be the same with fasting and non-fasting.  He no longer has symptoms suggestive of hypoglycemia.  I have noted that think that the proinsulin might be a useful tumor marker for him.  We will follow this (always in the context of glucose and c peptide)  Fasting labs.     11/19/2021 0845: HgbA1c 5.6%, glucose 98, C peptide 0.6, proinsulin pending, CGA pending    S/p Whipple procedure -   on treatment of pancreatic exocrine replacement. This may be a risk for future diabetes development, depending on the volume of pancreatic tissue that was actually removed.      Pre-diabetes in the past.  Repeat A1c    Obesity by BMI.  His weight is up from jennifer 230 9/17 but still lower than 283 prior to surgery for # 1.    Lifestyle to maintain weight or to lose a small amount would be ideal given the pre-diabetes.      Hilda Ortiz MD      Cc/HISTORY OF PRESENT ILLNESS  43- year old man presentsfor follow up of history of insulinoma. I last saw him 1/28/19. He continues to follow regularly with Dr Hanley in Oncology, where he was last seen 10/28/19.  The yhave continued with frequent imaging, most recent MRI 10/17/19.      See my 5/10/17 note for description of the presentation and diagnostic tests     His treatment course has been as follows:  11/18/16 FNA -EUS pancreatic head mass was positive for  malignancy - neuroendocrine tumor.    12/8/16  open nonpylorus preserving Whipple procedure with cholecystectomy and feeding jejunostomy removing  pancreatic neuroendocrine tumor (5.1 cm) , intermediate grade (G2).  0/10 LN negative.  KI -67 in some areas of tumor up to 5% ( the synoptic states there were 10 mitoses /10 HPF, no LV invasion.  No IHC was performed .    Weights  11/18/16 283  9/12/17: 230  1/19/18: 247  1/28/19: 268  Today: 266    Since the surgery he is no longer having the spells .  He eats 3-4 times/day, has no problem skipping meals.  His weight is relatively stable compared with last year.      We have been following neuroendocrine tumor markers as follows  6/5/17 :CGA 53, glucose 90  9/12/17: proinsulin < 1.6, C peptide 0.5, HgbA1c 5.1, CGA 62, glucose 88  1/16/18: proinsulin 2.1, C peptide 1.0, CGA 77  5/10/18: CGA 53, glucose 104  10/16/18: CGA 55, glucose 100  1/28/19: proinsulin 3.1 pM (< 8.0), C peptide 1.1, glucose 96,  HgbA1c 6.2% - followed appt,   10/17/19: CGA 63    We have the following imaging studies:   10/31/16: [facility] abdomen pancreas [facility]): 3.9 cm mass pancreas with central necrosis. Mass encases 50% of adjacent SMV which remains patent. - Liver unremarkable.   11/11/16 CT abdomen: enhancing lesion with cystic component or central necrosis within head and uncinate process of pancreas concerning for malignancy.  1 cm indeterminate portacaval LN.   12/1/16 octreoscan: read as negative; I think the study shows very faint activity at the pancreatic mass site.  6/5/17 MRI abdomen: ? Liver mass  4/18/10 and 10/17/19 MRI abdomen: KIKO      REVIEW OF SYSTEMS  Energy good  Sleep OK  Cardiac: negative  Respiratory: negative  GI: negtive  Occasional headache  No sweating    Past Medical History  Past Medical History:   Diagnosis Date     Hypoglycemia     due to insulinoma     Migraine      Pancreatic neuroendocrine tumor- insulinoma 2016     Ruptured appendix 2003     Tinea  versicolor      Uncomplicated asthma         Past Surgical History:   Procedure Laterality Date     APPENDECTOMY       ENDOSCOPIC ULTRASOUND UPPER GASTROINTESTINAL TRACT (GI) N/A 11/18/2016    Procedure: ENDOSCOPIC ULTRASOUND, ESOPHAGOSCOPY / UPPER GASTROINTESTINAL TRACT (GI);  Surgeon: Guru Malathi Canas MD;  Location: [facility] OR     PICC INSERTION       WHIPPLE PROCEDURE N/A 12/8/2016    Procedure: WHIPPLE PROCEDURE;  Surgeon: Matthias Armenta MD;  Location: [facility] OR       Medications  Current Outpatient Medications   Medication     albuterol (PROAIR HFA, PROVENTIL HFA, VENTOLIN HFA) 108 (90 BASE) MCG/ACT inhaler     amylase-lipase-protease (CREON 6) 6000 units CPEP     Ascorbic Acid (VITAMIN C PO)     ASPIRIN PO     Cholecalciferol (VITAMIN D-3 PO)     fluticasone (FLOVENT HFA) 110 MCG/ACT inhaler     Multiple Vitamin (MULTI-VITAMINS) TABS     Omega-3 Fatty Acids (OMEGA-3 FISH OIL PO)     Probiotic Product (PROBIOTIC ADVANCED) CAPS     vitamin B complex with vitamin C (VITAMIN  B COMPLEX) TABS tablet     No current facility-administered medications for this visit.        Current Outpatient Medications   Medication Sig Dispense Refill     albuterol (PROAIR HFA, PROVENTIL HFA, VENTOLIN HFA) 108 (90 BASE) MCG/ACT inhaler Inhale 1-2 puffs into the lungs every 6 hours as needed for shortness of breath / dyspnea or wheezing       amylase-lipase-protease (CREON 6) 6000 units CPEP Take 1-2 capsules (6,000-12,000 Units) by mouth 3 times daily (with meals) 450 capsule 3     Ascorbic Acid (VITAMIN C PO) Take by mouth 2 times daily        ASPIRIN PO Take 81 mg by mouth       Cholecalciferol (VITAMIN D-3 PO) Take by mouth daily Pt unsure of dosage       fluticasone (FLOVENT HFA) 110 MCG/ACT inhaler Inhale 2 puffs into the lungs At Bedtime        Multiple Vitamin (MULTI-VITAMINS) TABS        Omega-3 Fatty Acids (OMEGA-3 FISH OIL PO) Take 1 g by mouth 2 times daily (with meals) 3 capsules       Probiotic Product  "(PROBIOTIC ADVANCED) CAPS Take by mouth daily       vitamin B complex with vitamin C (VITAMIN  B COMPLEX) TABS tablet Take 1 tablet by mouth daily       Allergies  Allergies   Allergen Reactions     Cat Hair Extract      Runny Nose     Pollen Extract      Runny Nose     Family History  Family History   Problem Relation Age of Onset     Thyroid Disease Sister      Pancreatic Cancer No family hx of      Nephrolithiasis No family hx of      Brain Tumor No family hx of      Diabetes No family hx of      Social History  Social History     Tobacco Use     Smoking status: Never Smoker     Smokeless tobacco: Current User     Types: Chew     Tobacco comment: 1/2 can daily   Substance Use Topics     Alcohol use: Yes     Comment: 6-7 beers every 2-3 weeks     Drug use: No      .  ; eats 3-4 times/day during the day    Physical Exam  /84   Pulse 64   Ht 1.93 m (6' 4\")   Wt 121.1 kg (266 lb 14.4 oz)   BMI 32.49 kg/m    Body mass index is 32.49 kg/m .  GENERAL :  Large man In no apparent distress  SKIN: Normal color, normal temperature, texture.  No  purple striae.     EYES: PERRL, EOMI, No scleral icterus,  No proptosis, conjunctival redness, stare, retraction  MOUTH: Moist, pink; pharynx clear  NECK: No visible masses. No palpable adenopathy, or masses. No carotid bruits.   THYROID:  Not palpable  RESP: Lungs clear to auscultation bilaterally  CARDIAC: Regular rate and rhythm, normal S1 S2, without murmurs, rubs or gallops    ABDOMEN: Normal bowel sounds; soft, nontender, no HSM or masses       NEURO: awake, alert, responds appropriately to questions.  Cranial nerves intact.  Moves all extremities; Gait normal.  No tremor of the outstretched hand.  DTRs   1/4 ,   EXTREMITIES: No clubbing, cyanosis or edema.    DATA REVIEW    Results for YESENIA TORRES (MRN [id number]) as of 1/6/2020 13:40   Ref. Range 10/17/2019 11:35   Sodium Latest Ref Range: 133 - 144 mmol/L 138   Potassium Latest Ref Range: 3.4 - 5.3 " mmol/L 3.9   Chloride Latest Ref Range: 94 - 109 mmol/L 106   Carbon Dioxide Latest Ref Range: 20 - 32 mmol/L 26   Urea Nitrogen Latest Ref Range: 7 - 30 mg/dL 26   Creatinine Latest Ref Range: 0.66 - 1.25 mg/dL 0.95   GFR Estimate Latest Ref Range: >60 mL/min/1.73_m2 >90   GFR Estimate If Black Latest Ref Range: >60 mL/min/1.73_m2 >90   Calcium Latest Ref Range: 8.5 - 10.1 mg/dL 8.8   Anion Gap Latest Ref Range: 3 - 14 mmol/L 6   Albumin Latest Ref Range: 3.4 - 5.0 g/dL 3.8   Protein Total Latest Ref Range: 6.8 - 8.8 g/dL 7.0   Bilirubin Total Latest Ref Range: 0.2 - 1.3 mg/dL 0.7   Alkaline Phosphatase Latest Ref Range: 40 - 150 U/L 30 (L)   ALT Latest Ref Range: 0 - 70 U/L 79 (H)   AST Latest Ref Range: 0 - 45 U/L 43   Chromogranin A Latest Ref Range: 0 - 160 ng/mL 63   Glucose Latest Ref Range: 70 - 99 mg/dL 94     MRCP Without and With Contrast  CLINICAL HISTORY: Insulinoma  DATE: 10/17/2019 11:37 AM  TECHNIQUE:   Images were acquired with and without intravenous gadolinium contrast through the upper abdomen. The following MR images were acquired without intravenous contrast: TrueFISP, multiplanar T2-weighted, axial T1 in/out of phase, T2-weighted MRCP images, axial diffusion-weighted and axial apparent diffusion coefficient. T1-weighted images were  obtained before contrast at the multiple time points following contrast injection. 3-D reformatted images were generated by the technologist.Contrast dose: 12.0mL Gadavist   Comparison study: 4/18/2019, 10/18/2018, 5/10/2018, 11/16/2018, 6/5/2017, 11/11/2016  FINDINGS:  Biliary Tree: No extrahepatic or intrahepatic biliary duct dilatation. Postoperative changes of choledochojejunostomy. The right posterior duct appears to drain into the left duct, normal variant.   Pancreas: Stable postoperative changes of Whipple procedure with prominent main pancreatic duct (main pancreatic duct measures up to 6mm). No focal mass identified.   Liver: No evidence of hepatic  steatosis or iron deposition. Nosuspicious focal hepatic lesion.  Gallbladder: Surgically absent.  Spleen: Normal  Kidneys: Normal  Adrenal glands: Normal  Bowel: No abnormally dilated loops of small bowel or colon.  Lymph nodes: Stable enlarged portocaval lymph node (best seen on series 23, image 19). No new lymphadenopathy.  Blood vessels: Major intra-abdominal vessels are patent.  Lung bases: Clear.  Bones and soft tissues: Visualized enhancing foci in bilateral  posterior iliac bones are unchanged since 1/6/2018 (for example as  seen on series 26, image 12).     Mesentery and abdominal wall: Stable right para midline Oliva hernia  containing nondilated short segment of small bowel. Postsurgical  changes involving the ventral abdominal wall.   Ascites: None                                                                 IMPRESSION:   1-Stable Postoperative changes from Whipple procedure for insulinoma without evidence of recurrent disease in the abdomen.   2-Stable Oliva hernia containing nondilated short segment of small bowel.        I have personally reviewed the examination and initial interpretation  and I agree with the findings.

## 2020-02-20 DIAGNOSIS — D13.7 INSULINOMA: ICD-10-CM

## 2020-02-20 DIAGNOSIS — R73.03 PRE-DIABETES: ICD-10-CM

## 2020-02-20 DIAGNOSIS — D49.0 PANCREATIC TUMOR: ICD-10-CM

## 2020-02-20 LAB
GLUCOSE SERPL-MCNC: 100 MG/DL (ref 70–99)
HBA1C MFR BLD: 5.1 % (ref 0–5.6)

## 2020-02-20 PROCEDURE — 82947 ASSAY GLUCOSE BLOOD QUANT: CPT

## 2020-02-20 PROCEDURE — 99000 SPECIMEN HANDLING OFFICE-LAB: CPT

## 2020-02-20 PROCEDURE — 83036 HEMOGLOBIN GLYCOSYLATED A1C: CPT

## 2020-02-20 PROCEDURE — 84206 ASSAY OF PROINSULIN: CPT | Mod: 90

## 2020-02-20 PROCEDURE — 84681 ASSAY OF C-PEPTIDE: CPT

## 2020-02-20 PROCEDURE — 36415 COLL VENOUS BLD VENIPUNCTURE: CPT

## 2020-02-21 LAB — C PEPTIDE SERPL-MCNC: 1 NG/ML (ref 0.9–6.9)

## 2020-02-23 LAB — PROINSULIN P 12H FAST SERPL-SCNC: 1.9 PMOL/L

## 2020-03-13 ENCOUNTER — PATIENT OUTREACH (OUTPATIENT)
Dept: ONCOLOGY | Facility: CLINIC | Age: 45
End: 2020-03-13

## 2020-03-13 DIAGNOSIS — D13.7 INSULINOMA: ICD-10-CM

## 2020-03-13 NOTE — PROGRESS NOTES
Oncology RN Care Coordination Note:     Patient had called requesting a refill of his Creon.  Writer sent a message to Dr. Hanley with the request, he states that's fine, writer sent patient's refill over to [facility] in [place], [place].  Follow up with Dr. Hanley already scheduled.     Valentina Albarado, RN BSN   [facility]  Nurse Coordinator

## 2020-04-21 ENCOUNTER — PATIENT OUTREACH (OUTPATIENT)
Dept: ONCOLOGY | Facility: CLINIC | Age: 45
End: 2020-04-21

## 2020-04-21 NOTE — PROGRESS NOTES
Oncology RN Care Coordination Note:     Writer received a voicemail from the radiology department wondering if this patient's imaging appointment is essential or not?    Writer discussed this with Dr. Hanley and he said yes, it is essential.  I called radiology back and informed them of this information.     Valentina Albarado, RN BSN   [facility]  Nurse Coordinator

## 2020-04-24 ENCOUNTER — ANCILLARY PROCEDURE (OUTPATIENT)
Dept: MRI IMAGING | Facility: CLINIC | Age: 45
End: 2020-04-24
Attending: INTERNAL MEDICINE
Payer: COMMERCIAL

## 2020-04-24 DIAGNOSIS — D13.7 INSULINOMA: ICD-10-CM

## 2020-04-24 LAB
ALBUMIN SERPL-MCNC: 3.8 G/DL (ref 3.4–5)
ALP SERPL-CCNC: 28 U/L (ref 40–150)
ALT SERPL W P-5'-P-CCNC: 57 U/L (ref 0–70)
ANION GAP SERPL CALCULATED.3IONS-SCNC: 5 MMOL/L (ref 3–14)
AST SERPL W P-5'-P-CCNC: 43 U/L (ref 0–45)
BILIRUB SERPL-MCNC: 0.7 MG/DL (ref 0.2–1.3)
BUN SERPL-MCNC: 23 MG/DL (ref 7–30)
CALCIUM SERPL-MCNC: 8.9 MG/DL (ref 8.5–10.1)
CHLORIDE SERPL-SCNC: 108 MMOL/L (ref 94–109)
CO2 SERPL-SCNC: 25 MMOL/L (ref 20–32)
CREAT SERPL-MCNC: 0.84 MG/DL (ref 0.66–1.25)
GFR SERPL CREATININE-BSD FRML MDRD: >90 ML/MIN/{1.73_M2}
GLUCOSE SERPL-MCNC: 97 MG/DL (ref 70–99)
POTASSIUM SERPL-SCNC: 3.8 MMOL/L (ref 3.4–5.3)
PROT SERPL-MCNC: 7 G/DL (ref 6.8–8.8)
SODIUM SERPL-SCNC: 138 MMOL/L (ref 133–144)

## 2020-04-24 PROCEDURE — 86316 IMMUNOASSAY TUMOR OTHER: CPT | Performed by: INTERNAL MEDICINE

## 2020-04-24 PROCEDURE — 80053 COMPREHEN METABOLIC PANEL: CPT | Performed by: INTERNAL MEDICINE

## 2020-04-24 RX ORDER — GADOBUTROL 604.72 MG/ML
10 INJECTION INTRAVENOUS ONCE
Status: COMPLETED | OUTPATIENT
Start: 2020-04-24 | End: 2020-04-24

## 2020-04-24 RX ADMIN — GADOBUTROL 10 ML: 604.72 INJECTION INTRAVENOUS at 10:37

## 2020-04-24 NOTE — NURSING NOTE
Chief Complaint   Patient presents with     Blood Draw     Labs drawn via PIV by RN in lab.     Labs drawn from previously placed PIV by RN. Line removed.    Alina Mitchell RN

## 2020-04-24 NOTE — DISCHARGE INSTRUCTIONS
MRI Contrast Discharge Instructions    The IV contrast you received today will pass out of your body in your  urine. This will happen in the next 24 hours. You will not feel this process.  Your urine will not change color.    Drink at least 4 extra glasses of water or juice today (unless your doctor  has restricted your fluids). This reduces the stress on your kidneys.  You may take your regular medicines.    If you are on dialysis: It is best to have dialysis today.    If you have a reaction: Most reactions happen right away. If you have  any new symptoms after leaving the hospital (such as hives or swelling),  call your hospital at the correct number below. Or call your family doctor.  If you have breathing distress or wheezing, call 911.    Special instructions: ***    I have read and understand the above information.    Signature:______________________________________ Date:___________    Staff:__________________________________________ Date:___________     Time:__________    [facility] Departments:    ___[place]: [phone]  ___[facility]: [phone]  ___[place]: [phone] ___[facility]: [phone]  ___[facility]: [phone]  ___[facility]: [phone]  ___[phone]  ___[facility]: [phone]  ___[phone]

## 2020-04-26 LAB — CGA SERPL-MCNC: 67 NG/ML (ref 0–160)

## 2020-04-29 NOTE — PROGRESS NOTES
"Joey Guerrero is a 45 year old male who is being evaluated via a billable video visit.      The patient has been notified of following:     \"This video visit will be conducted via a call between you and your physician/provider. We have found that certain health care needs can be provided without the need for an in-person physical exam.  This service lets us provide the care you need with a video conversation.  If a prescription is necessary we can send it directly to your pharmacy.  If lab work is needed we can place an order for that and you can then stop by our lab to have the test done at a later time.    Video visits are billed at different rates depending on your insurance coverage.  Please reach out to your insurance provider with any questions.    If during the course of the call the physician/provider feels a video visit is not appropriate, you will not be charged for this service.\"    Patient has given verbal consent for Video visit? Yes    How would you like to obtain your AVS? Mail a copy    Patient would like the video invitation sent by: Send to e-mail at: [email or web address]    Will anyone else be joining your video visit? No         I have reviewed and updated the patient's allergies and medication list.    Concerns: No new concerns.  Refills: None needed.     Tamera Feliciano, [facility]                    Oncology Follow-up Visit:  May 1, 2020    Diagnosis: [id number] pancreatic head insulinoma.  No evidence of metastatic disease.     Treatment to date: Whipple resection December 8, 2016, by Dr. Matthias Armenta. Zero of 10 lymph nodes involved, grade 2, negative resection margins, Ki-67 equals 5%.  preoperative serum chromogranin A = 70 on 12/05/2016.     REFERRING PHYSICIAN:  Dr. Matthias Armenta, Hepatobiliary Surgery, [facility].      Oncology History:  Over a 2-3 year period, beginning in 2014 or so, he was having hypoglycemic episodes of unexplained origin.  He was even hospitalized for one such episode " in the fall of 2016.  He met with a primary care physician, and subsequently an endocrinologist.  There was concern for hypoglycemic cause and concern for potential insulinoma.  On 10/31/2016, MRI at [facility] revealed a 3.9 x 3.7 x 3.6 cm mass in the head of the pancreas.  There was involvement of the superior mesenteric vein, but no evidence of metastatic disease noted.  He met with Dr. Migel Sena at [facility], and a referral was made for surgical evaluation.      On 11/11/2016, a CT abdomen and pelvis was performed revealing an enhancing lesion with a cystic component versus central necrosis in the head and the uncinate process of the pancreas.  There was an indeterminate portacaval lymph node measuring 2.1 cm.  Otherwise, no evidence of suspicious adenopathy in the abdomen.  EUS was performed by Dr. Guru Canas at the [facility] on 11/18/2016.  He performed an FNA.  This revealed the tumor was composed of grade 1 neuroendocrine tumor with a low proliferation marker of Ki-67 less than 2%.  The tumor was positive for synaptophysin, chromogranin and CD56.      On 12/01/2016, the patient underwent a preoperative octreotide scan that did not show any evidence of metastatic disease or tumor.  There was no radiotracer uptake.  A baseline chromogranin A was 70.      His C peptide at baseline was 0.7.  Insulin level at the preoperative baseline was 2.2 and 3.5.  Proinsulin levels were less than 7.5, and a subsequent level was 9.6.     On 12/05/2016, the patient met with Dr. Matthias Armenta, who took the patient to the operating room on 12/08/2016 to perform a Whipple.  The pancreaticoduodenectomy resulted in the revealing of a pancreatic neuroendocrine tumor at the head of the pancreas that measured 5.1 cm with intermediate grade G2.  Margins were negative by 0.1 cm.  Zero of 12 lymph nodes were involved.  The Ki-67 registered at 5% with 4 mitoses/10 high-power fields.  There was no  perivascular, nor perineural invasion.  The pathologic stage was T2.  The pathologic N stage was N0, and M0 per the prior octreotide scan.  The patient is currently 9 weeks out from surgery.    2/6/17 - - medical oncology consultation, Dr. Hanley.  6/5/17 - - MRI abdomen: IMPRESSION:   1. Postsurgical of Whipple procedure evidence of locally recurrent  neuroendocrine tumor.  2. 1.2 cm ill-defined hepatic lesion in the central liver. This is  indeterminate and could represent sequela of recent episode of  inflammation or infection. However, it is new from prior and  metastases are not excluded. Consider short-term follow-up MRI.  3. Mildly increased size of pericaval lymph node and unchanged mildly  enlarged randy hepatis lymph node, indeterminate but favored to be  reactive.  6/9/17 - - oncology follow-up, Dr. Hanley.  9/12/17 - - endocrine consultation, Dr. Ortiz. Assessment of insulinoma: recommendation to follow Pro-insulin is a potential useful tumor marker for recurrence, to be followed by endocrinology, in context of glucose and C-peptide levels.  9/12/17 - - MRI abdomen: IMPRESSION: In this patient with history of pancreatic head  insulinoma, post Whipple resection there is no evidence for metastatic  disease within the abdomen. Previously described lesion in the central  liver near the IVC is not well characterized in the present study.  9/15/17 - - oncology follow-up, Dr. Hanley.  1/16/18 - - MRI abdomen: IMPRESSION:   In this patient with history of insulinoma in the pancreatic head  status post Whipple procedure:  1. No evidence of metastatic disease within the abdomen.  2. Mild pancreatic duct ectasia measuring up to 6 mm. This is  unchanged to minimally increased when compared to the prior exam and  of uncertain clinical significance. No significant associated T1  abnormality to suggest pancreatitis.  1/19/18 - - oncology follow-up, Dr. Hanley.  5/10/18 - - MRI abdomen IMPRESSION: In this patient with history  of neuroendocrine tumor of  the pancreas and subsequent Whipple procedure:  1. Slightly increased size of a precaval lymph node, currently  measuring 1.5 x 2.0 cm comparison measured 1.3 x 1.6 cm on 1/16/2018  exam. This is indeterminate  2. Mild persistent dilatation of the main pancreatic duct measuring 6mm.    5/14/18 - - oncology follow-up, Dr. Hanley.  6/12/18 - - patient was no-show for endocrinology visit with Dr. Ortiz.  10/18/18 - - MRI abdomen: IMPRESSION:   1. In this patient with history of pancreatic insulinoma status post  Whipple procedure, there is no evidence of locally recurrent or  metastatic disease.  2. Stable prominent portacaval lymph node.  10/22/18 - - oncology follow-up, Dr. Hanley.  4/18/19 - - MRI abdomen: IMPRESSION: Postoperative changes from Whipple procedure for  insulinoma without evidence of recurrent disease in the abdomen.  4/22/19 - - oncology follow-up, Dr. Hanley.    10/17/19--MRI abdomen IMPRESSION:   1-Stable Postoperative changes from Whipple procedure for insulinoma  without evidence of recurrent disease in the abdomen.   2-Stable Oliva hernia containing nondilated short segment of small  bowel.   10/28/19--oncology follow-up, Dr. Hanley.    4/24/20--MRI abdomen - IMPRESSION:  1. Postsurgical changes of Whipple procedure with resection of  insulinoma. No evidence of recurrent disease.  2. Unchanged small right paramedian Oliva hernia containing a loop  of nondilated noninflamed small bowel.    5/1/20--oncology follow-up, Dr. Hanley.           Interim History/History Of Present Illness:  Mr. Joey Guerrero is a 45 year old  man with pancreatic head insulinoma resected by Dr. Armenta on December 8, 2016.          .Mr. Guerrero joined me for a virtual visit via video using the [employer] nicholas.  This is in the context of the ongoing coronavirus pandemic and the stay-at-home order.  He is joined by his wife.  He is at home during the video interaction, which lasted between 10:18 a.m. and  10:24 a.m. on Friday, 05/01/2020.  He generally is doing well.  I last evaluated him in the fall.  At the current time, he is about 3-1/2 years out from the surgical resection done with the intent to cure; in other words, a Whipple procedure performed by Dr. Armenta on 12/08/2016.  At that time Dr. Armenta resected the pancreatic insulinoma.      The MRI abdomen that was done for routine followup on 04/21/2020 shows no evidence of recurrent disease.  There is a rectal ventral hernia that remains unchanged.  The chromogranin remains stable.  The patient confirms for me that he is not having any of the symptoms that he presented with initially that led to the diagnosis of insulinoma several years ago, including lack of dizziness or any other signs of hypoglycemia or labile blood sugar.  He is generally feeling well.  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       Review Of Systems:  Full 14-point ROS reviewed. Pertinent symptoms reviewed above per HPI.    Past medical, social, surgical, and family histories reviewed.     PAST MEDICAL HISTORY:  Pancreatic neuroendocrine tumor, pathologic T2 N0 M0 per above, status post Whipple surgery on 12/08/2016.  Other surgeries notable include an appendectomy in 2007.      FAMILY HISTORY:  Denies any family history of malignancy.      SOCIAL HISTORY:  The patient lives in [place], [place], with his wife.  They have 2 boys, ages 8 and 4.  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    Tobacco:  Used to chew and smoke occasional cigarettes since the age of 18 until recently.    Alcohol:  Denies alcohol abuse.    Drugs:  Denies illicit drug use.     Allergies:  Allergies as of 05/01/2020 - Reviewed 01/06/2020   Allergen Reaction Noted     Cat hair extract  12/05/2016     Pollen extract  12/05/2016       Current Medications:  Current Outpatient Medications   Medication Sig Dispense Refill     albuterol (PROAIR HFA, PROVENTIL HFA, VENTOLIN HFA) 108 (90  BASE) MCG/ACT inhaler Inhale 1-2 puffs into the lungs every 6 hours as needed for shortness of breath / dyspnea or wheezing       amylase-lipase-protease (CREON 6) 6000 units CPEP Take 1-2 capsules (6,000-12,000 Units) by mouth 3 times daily (with meals) 450 capsule 3     Ascorbic Acid (VITAMIN C PO) Take by mouth 2 times daily        ASPIRIN PO Take 81 mg by mouth       Cholecalciferol (VITAMIN D-3 PO) Take by mouth daily Pt unsure of dosage       fluticasone (FLOVENT HFA) 110 MCG/ACT inhaler Inhale 2 puffs into the lungs At Bedtime        Multiple Vitamin (MULTI-VITAMINS) TABS        Omega-3 Fatty Acids (OMEGA-3 FISH OIL PO) Take 1 g by mouth 2 times daily (with meals) 3 capsules       Probiotic Product (PROBIOTIC ADVANCED) CAPS Take by mouth daily       vitamin B complex with vitamin C (VITAMIN  B COMPLEX) TABS tablet Take 1 tablet by mouth daily          Physical Exam:  Physical exam could not be performed today in context of a Virtual Visit during the COVID19/Coronavirus pandemic.    Observed physical assessments made today by visualizing the patient by video link:    General/Constitutional: Generally appears well, not acutely ill.  HEENT: no scleral icterus, not jaundiced.  Respiratory: no labored breathing.  Musculoskeletal: appears to have full range of motion and adequate physical strength.  Skin: no jaundice, discoloration or other notable lesions.  Neurological: no evidence of tremors.  Psychiatric: no evident anxiety; fully alert and oriented with fluent speech.      The rest of a comprehensive physical examination is deferred due to PHE (public health emergency) video visit restrictions.    Laboratory/Imaging Studies.  I released the pertinent recent imaging results to [employer] in advance of this visit, and reviewed the summary verbatim and in lay language during today's call.        ASSESSMENT/PLAN:  45 year old man with pancreatic head insulinoma, who presented with hypoglycemic episodes for several  years prior to diagnosis. He had Whipple surgery by Dr. Matthias Armenta on 12/08/2016 (pT2NM0).     He is 3+ years out from his definitive surgery performed by Dr. Matthias Armenta for his pancreatic head insulinoma.  He has relatively low risk with relatively low Ki-67 index.  He had negative margins.  It was grade 2 at the time with negative lymph node involvement.  Chromogranin A markers have been unremarkable.     Three-and-a-half years out from his 12/08/2016 Whipple surgery by Dr. Armenta.  There continues to remain no evidence of recurrent malignancy.  The patient is at relatively low risk as noted above.  I recommended continued active surveillance.  The patient was amenable to this and stated verbal agreement.  We will go forward with the MRI abdomen and chromogranin A check as well as a CMP in 6-8 months from now at the patient's discretion, as I know he lives far away and will work around his work schedule to do this.  Again, he is at relatively low risk, and being several years out, that timeframe should be quite reasonable and per standard of care guidelines.  He will continue to follow up with Dr. Ortiz on a yearly basis from the Endocrinology standpoint due to the history of the fact that the neuroendocrine tumor was an insulinoma subtype.         I have reviewed the note as documented above. This accurately captures the substance of my conversation with the patient.    Date of call: May 1, 2020    Start of call: 10:18 am  End of call: 10:24 am    Provider location: [facility] (academic office)  Patient location: Home    Mode of Video Visit: Thang Hanley MD, PhD

## 2020-05-01 ENCOUNTER — VIRTUAL VISIT (OUTPATIENT)
Dept: ONCOLOGY | Facility: CLINIC | Age: 45
End: 2020-05-01
Attending: INTERNAL MEDICINE
Payer: COMMERCIAL

## 2020-05-01 DIAGNOSIS — D13.7 INSULINOMA: Primary | ICD-10-CM

## 2020-05-01 PROCEDURE — 99214 OFFICE O/P EST MOD 30 MIN: CPT | Mod: GT | Performed by: INTERNAL MEDICINE

## 2020-05-01 PROCEDURE — 40000114 ZZH STATISTIC NO CHARGE CLINIC VISIT

## 2020-11-20 ENCOUNTER — ANCILLARY PROCEDURE (OUTPATIENT)
Dept: MRI IMAGING | Facility: CLINIC | Age: 45
End: 2020-11-20
Attending: INTERNAL MEDICINE
Payer: COMMERCIAL

## 2020-11-20 DIAGNOSIS — D13.7 INSULINOMA: ICD-10-CM

## 2020-11-20 DIAGNOSIS — D13.7: Primary | ICD-10-CM

## 2020-11-20 LAB
ALBUMIN SERPL-MCNC: 3.7 G/DL (ref 3.4–5)
ALP SERPL-CCNC: 31 U/L (ref 40–150)
ALT SERPL W P-5'-P-CCNC: 57 U/L (ref 0–70)
ANION GAP SERPL CALCULATED.3IONS-SCNC: 4 MMOL/L (ref 3–14)
AST SERPL W P-5'-P-CCNC: 40 U/L (ref 0–45)
BILIRUB SERPL-MCNC: 0.6 MG/DL (ref 0.2–1.3)
BUN SERPL-MCNC: 24 MG/DL (ref 7–30)
CALCIUM SERPL-MCNC: 9.4 MG/DL (ref 8.5–10.1)
CHLORIDE SERPL-SCNC: 101 MMOL/L (ref 94–109)
CO2 SERPL-SCNC: 28 MMOL/L (ref 20–32)
CREAT SERPL-MCNC: 0.9 MG/DL (ref 0.66–1.25)
GFR SERPL CREATININE-BSD FRML MDRD: >90 ML/MIN/{1.73_M2}
GLUCOSE SERPL-MCNC: 108 MG/DL (ref 70–99)
POTASSIUM SERPL-SCNC: 4 MMOL/L (ref 3.4–5.3)
PROT SERPL-MCNC: 7.2 G/DL (ref 6.8–8.8)
SODIUM SERPL-SCNC: 133 MMOL/L (ref 133–144)

## 2020-11-20 PROCEDURE — 99000 SPECIMEN HANDLING OFFICE-LAB: CPT | Performed by: PATHOLOGY

## 2020-11-20 PROCEDURE — 36415 COLL VENOUS BLD VENIPUNCTURE: CPT | Performed by: PATHOLOGY

## 2020-11-20 PROCEDURE — 74183 MRI ABD W/O CNTR FLWD CNTR: CPT | Mod: GC | Performed by: RADIOLOGY

## 2020-11-20 PROCEDURE — 84999 UNLISTED CHEMISTRY PROCEDURE: CPT | Mod: 90 | Performed by: PATHOLOGY

## 2020-11-20 PROCEDURE — A9585 GADOBUTROL INJECTION: HCPCS | Performed by: RADIOLOGY

## 2020-11-20 PROCEDURE — 80053 COMPREHEN METABOLIC PANEL: CPT | Performed by: PATHOLOGY

## 2020-11-20 RX ORDER — GADOBUTROL 604.72 MG/ML
15 INJECTION INTRAVENOUS ONCE
Status: COMPLETED | OUTPATIENT
Start: 2020-11-20 | End: 2020-11-20

## 2020-11-20 RX ADMIN — GADOBUTROL 12 ML: 604.72 INJECTION INTRAVENOUS at 10:40

## 2020-11-20 NOTE — DISCHARGE INSTRUCTIONS
MRI Contrast Discharge Instructions    The IV contrast you received today will pass out of your body in your  urine. This will happen in the next 24 hours. You will not feel this process.  Your urine will not change color.    Drink at least 4 extra glasses of water or juice today (unless your doctor  has restricted your fluids). This reduces the stress on your kidneys.  You may take your regular medicines.    If you are on dialysis: It is best to have dialysis today.    If you have a reaction: Most reactions happen right away. If you have  any new symptoms after leaving the hospital (such as hives or swelling),  call your hospital at the correct number below. Or call your family doctor.  If you have breathing distress or wheezing, call 911.    Special instructions: ***    I have read and understand the above information.    Signature:______________________________________ Date:___________    Staff:__________________________________________ Date:___________     Time:__________    [facility] Departments:    ___[place]: [phone]  ___[facility]: [phone]  ___[place]: [phone] ___[facility]: [phone]  ___[facility]: [phone]  ___[facility]: [phone]  ___[phone]  ___[facility]: [phone]  ___[phone]

## 2020-11-22 LAB
RESULT: NORMAL
SEND OUTS MISC TEST CODE: NORMAL
SEND OUTS MISC TEST SPECIMEN: NORMAL
TEST NAME: NORMAL

## 2020-11-23 LAB — MISCELLANEOUS TEST: NORMAL

## 2020-11-30 ENCOUNTER — VIRTUAL VISIT (OUTPATIENT)
Dept: ONCOLOGY | Facility: CLINIC | Age: 45
End: 2020-11-30
Attending: INTERNAL MEDICINE
Payer: COMMERCIAL

## 2020-11-30 DIAGNOSIS — D13.7 INSULINOMA: Primary | ICD-10-CM

## 2020-11-30 PROCEDURE — 999N001193 HC VIDEO/TELEPHONE VISIT; NO CHARGE

## 2020-11-30 PROCEDURE — 99214 OFFICE O/P EST MOD 30 MIN: CPT | Mod: GT | Performed by: INTERNAL MEDICINE

## 2020-11-30 NOTE — PROGRESS NOTES
"Joey Guerrero is a 45 year old male who is being evaluated via a billable video visit.      The patient has been notified of following:     \"This video visit will be conducted via a call between you and your physician/provider. We have found that certain health care needs can be provided without the need for an in-person physical exam.  This service lets us provide the care you need with a video conversation.  If a prescription is necessary we can send it directly to your pharmacy.  If lab work is needed we can place an order for that and you can then stop by our lab to have the test done at a later time.    Video visits are billed at different rates depending on your insurance coverage.  Please reach out to your insurance provider with any questions.    If during the course of the call the physician/provider feels a video visit is not appropriate, you will not be charged for this service.\"    Patient has given verbal consent for Video visit? Yes  How would you like to obtain your AVS? Mail a copy  If you are dropped from the video visit, the video invite should be resent to: Text to cell phone: [phone]  Will anyone else be joining your video visit? No      Vitals - Patient Reported  Weight (Patient Reported): 113.4 kg (250 lb)  Pain Score: No Pain (0)        I have reviewed and updated patient's allergy and medication list.    Concerns: None  Refills: None      Kaela Tolentino CMA      Video-Visit Details    Type of service:  Video Visit    Video visit duration 30 min    Originating Location (pt. Location): Home    Distant Location (provider location):  [facility] [place] [facility]     Platform used for Video Visit: [facility]      Oncology Follow-up Visit:  Nov 30, 2020    Diagnosis: [id number] pancreatic head insulinoma.  No evidence of metastatic disease.     Treatment to date: Whipple resection December 8, 2016, by Dr. Matthias Armenta. Zero of 10 lymph nodes involved, grade 2, negative resection margins, " Ki-67 equals 5%.  preoperative serum chromogranin A = 70 on 12/05/2016.     REFERRING PHYSICIAN:  Dr. Matthias Armenta, Hepatobiliary Surgery, [facility].      Oncology History:  Over a 2-3 year period, beginning in 2014 or so, he was having hypoglycemic episodes of unexplained origin.  He was even hospitalized for one such episode in the fall of 2016.  He met with a primary care physician, and subsequently an endocrinologist.  There was concern for hypoglycemic cause and concern for potential insulinoma.  On 10/31/2016, MRI at [facility] revealed a 3.9 x 3.7 x 3.6 cm mass in the head of the pancreas.  There was involvement of the superior mesenteric vein, but no evidence of metastatic disease noted.  He met with Dr. Migel Sena at [facility], and a referral was made for surgical evaluation.      On 11/11/2016, a CT abdomen and pelvis was performed revealing an enhancing lesion with a cystic component versus central necrosis in the head and the uncinate process of the pancreas.  There was an indeterminate portacaval lymph node measuring 2.1 cm.  Otherwise, no evidence of suspicious adenopathy in the abdomen.  EUS was performed by Dr. Guru Canas at the [facility] on 11/18/2016.  He performed an FNA.  This revealed the tumor was composed of grade 1 neuroendocrine tumor with a low proliferation marker of Ki-67 less than 2%.  The tumor was positive for synaptophysin, chromogranin and CD56.      On 12/01/2016, the patient underwent a preoperative octreotide scan that did not show any evidence of metastatic disease or tumor.  There was no radiotracer uptake.  A baseline chromogranin A was 70.      His C peptide at baseline was 0.7.  Insulin level at the preoperative baseline was 2.2 and 3.5.  Proinsulin levels were less than 7.5, and a subsequent level was 9.6.     On 12/05/2016, the patient met with Dr. Matthias Armenta, who took the patient to the operating room on 12/08/2016 to  perform a Whipple.  The pancreaticoduodenectomy resulted in the revealing of a pancreatic neuroendocrine tumor at the head of the pancreas that measured 5.1 cm with intermediate grade G2.  Margins were negative by 0.1 cm.  Zero of 12 lymph nodes were involved.  The Ki-67 registered at 5% with 4 mitoses/10 high-power fields.  There was no perivascular, nor perineural invasion.  The pathologic stage was T2.  The pathologic N stage was N0, and M0 per the prior octreotide scan.  The patient is currently 9 weeks out from surgery.    2/6/17 - - medical oncology consultation, Dr. Hanley.  6/5/17 - - MRI abdomen: IMPRESSION:   1. Postsurgical of Whipple procedure evidence of locally recurrent  neuroendocrine tumor.  2. 1.2 cm ill-defined hepatic lesion in the central liver. This is  indeterminate and could represent sequela of recent episode of  inflammation or infection. However, it is new from prior and  metastases are not excluded. Consider short-term follow-up MRI.  3. Mildly increased size of pericaval lymph node and unchanged mildly  enlarged randy hepatis lymph node, indeterminate but favored to be  reactive.  6/9/17 - - oncology follow-up, Dr. Hanley.  9/12/17 - - endocrine consultation, Dr. Ortiz. Assessment of insulinoma: recommendation to follow Pro-insulin is a potential useful tumor marker for recurrence, to be followed by endocrinology, in context of glucose and C-peptide levels.  9/12/17 - - MRI abdomen: IMPRESSION: In this patient with history of pancreatic head  insulinoma, post Whipple resection there is no evidence for metastatic  disease within the abdomen. Previously described lesion in the central  liver near the IVC is not well characterized in the present study.  9/15/17 - - oncology follow-up, Dr. Hanley.  1/16/18 - - MRI abdomen: IMPRESSION:   In this patient with history of insulinoma in the pancreatic head  status post Whipple procedure:  1. No evidence of metastatic disease within the abdomen.  2.  Mild pancreatic duct ectasia measuring up to 6 mm. This is  unchanged to minimally increased when compared to the prior exam and  of uncertain clinical significance. No significant associated T1  abnormality to suggest pancreatitis.  1/19/18 - - oncology follow-up, Dr. Hanley.  5/10/18 - - MRI abdomen IMPRESSION: In this patient with history of neuroendocrine tumor of  the pancreas and subsequent Whipple procedure:  1. Slightly increased size of a precaval lymph node, currently  measuring 1.5 x 2.0 cm comparison measured 1.3 x 1.6 cm on 1/16/2018  exam. This is indeterminate  2. Mild persistent dilatation of the main pancreatic duct measuring 6mm.    5/14/18 - - oncology follow-up, Dr. Hanley.  6/12/18 - - patient was no-show for endocrinology visit with Dr. Ortiz.  10/18/18 - - MRI abdomen: IMPRESSION:   1. In this patient with history of pancreatic insulinoma status post  Whipple procedure, there is no evidence of locally recurrent or  metastatic disease.  2. Stable prominent portacaval lymph node.  10/22/18 - - oncology follow-up, Dr. Hanley.  4/18/19 - - MRI abdomen: IMPRESSION: Postoperative changes from Whipple procedure for  insulinoma without evidence of recurrent disease in the abdomen.  4/22/19 - - oncology follow-up, Dr. Hanley.    10/17/19--MRI abdomen IMPRESSION:   1-Stable Postoperative changes from Whipple procedure for insulinoma  without evidence of recurrent disease in the abdomen.   2-Stable Oliva hernia containing nondilated short segment of small  bowel.   10/28/19--oncology follow-up, Dr. Hanley.    4/24/20--MRI abdomen - IMPRESSION:  1. Postsurgical changes of Whipple procedure with resection of  insulinoma. No evidence of recurrent disease.  2. Unchanged small right paramedian Oliva hernia containing a loop  of nondilated noninflamed small bowel.    5/1/20--oncology follow-up, Dr. Hanley.      11/20/20 MRI abdomen IMPRESSION:  1. Postsurgical changes of Whipple procedure with resection of  insulinoma.  No evidence of residual/recurrent disease. No definite  metastasis in the abdomen and pelvis.  2. Unchanged small midline abdominal wall Oliva hernia containing a  loop of nondilated noninflamed small bowel.  3. Unchanged prominent portocaval lymph node, measuring 1.7 cm.  4. Hepatomegaly.     11/20/20 Chromogranin 60 ng/ml    11/30/2020 - follow up appointment with Dr. Hanley         Interim History/History Of Present Illness:  Mr. Joey Guerrero is a 45 year old  man with pancreatic head insulinoma resected by Dr. Armenta on December 8, 2016.    Mr. Guerrero joined me for a virtual visit via video visit due to ongoing coronavirus pandemic and the stay-at-home order.  Wife is also present.  He generally is doing well.  Last seen by Dr. Hanley in summer.  At the current time, he is about 4 years out from the Whipple procedure performed by Dr. Armenta on 12/08/2016.  The MRI abdomen that was done for routine followup on 11/202/2020 shows no evidence of recurrent disease. The patient confirms for me that he is not having any of the symptoms that he presented with initially that led to the diagnosis of insulinoma several years ago, including lack of dizziness or any other signs of hypoglycemia or labile blood sugar.  He is generally feeling well.  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       Review Of Systems:  Full 14-point ROS reviewed. Pertinent symptoms reviewed above per HPI.    Past medical, social, surgical, and family histories reviewed.     PAST MEDICAL HISTORY:  Pancreatic neuroendocrine tumor, pathologic T2 N0 M0 per above, status post Whipple surgery on 12/08/2016.  Other surgeries notable include an appendectomy in 2007.      FAMILY HISTORY:  Denies any family history of malignancy.      SOCIAL HISTORY:  The patient lives in [place], [place], with his wife.  They have 2 boys, ages 8 and 4.  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    Tobacco:  Used to chew and smoke  occasional cigarettes since the age of 18 until recently.    Alcohol:  Denies alcohol abuse.    Drugs:  Denies illicit drug use.     Allergies:  Allergies as of 11/30/2020 - Reviewed 05/01/2020   Allergen Reaction Noted     Cat hair extract  12/05/2016     Pollen extract  12/05/2016       Current Medications:  Current Outpatient Medications   Medication Sig Dispense Refill     albuterol (PROAIR HFA, PROVENTIL HFA, VENTOLIN HFA) 108 (90 BASE) MCG/ACT inhaler Inhale 1-2 puffs into the lungs every 6 hours as needed for shortness of breath / dyspnea or wheezing       amylase-lipase-protease (CREON 6) 6000 units CPEP Take 1-2 capsules (6,000-12,000 Units) by mouth 3 times daily (with meals) 450 capsule 3     Ascorbic Acid (VITAMIN C PO) Take by mouth 2 times daily        ASPIRIN PO Take 81 mg by mouth       Cholecalciferol (VITAMIN D-3 PO) Take by mouth daily Pt unsure of dosage       fluticasone (FLOVENT HFA) 110 MCG/ACT inhaler Inhale 2 puffs into the lungs At Bedtime        Multiple Vitamin (MULTI-VITAMINS) TABS        Omega-3 Fatty Acids (OMEGA-3 FISH OIL PO) Take 1 g by mouth 2 times daily (with meals) 3 capsules       Probiotic Product (PROBIOTIC ADVANCED) CAPS Take by mouth daily       vitamin B complex with vitamin C (VITAMIN  B COMPLEX) TABS tablet Take 1 tablet by mouth daily          Physical Exam:  Physical exam could not be performed today in context of a Virtual Visit during the COVID19/Coronavirus pandemic.    Observed physical assessments made today by visualizing the patient by video link:    General/Constitutional: Generally appears well, not acutely ill.  HEENT: no scleral icterus, not jaundiced.  Respiratory: no labored breathing.  Musculoskeletal: appears to have full range of motion and adequate physical strength.  Skin: no jaundice, discoloration or other notable lesions.  Neurological: no evidence of tremors.  Psychiatric: no evident anxiety; fully alert and oriented with fluent speech.      The  rest of a comprehensive physical examination is deferred due to PHE (public health emergency) video visit restrictions.    Laboratory/Imaging Studies.  I released the pertinent recent imaging results to [place] in advance of this visit, and reviewed the summary verbatim and in lay language during today's call.      ASSESSMENT/PLAN:  45 year old man with pancreatic head insulinoma, who presented with hypoglycemic episodes for several years prior to diagnosis. He had Whipple surgery by Dr. Matthias Armenta on 12/08/2016 ([id number]).     He is almost 4 years out from his definitive surgery performed by Dr. Matthias Armenta for his pancreatic head insulinoma.  He has relatively low risk with relatively low Ki-67 index.  He had negative margins.  It was grade 2 at the time with negative lymph node involvement.  Chromogranin A markers have been unremarkable.     MRI done earlier this month with no evidence of recurrent malignancy. The patient is at relatively low risk as noted above. We recommended continued active surveillance. We will go forward with the MRI abdomen and chromogranin A check as well as a CMP in a year from now.  Again, he is at relatively low risk, and being several years out, that timeframe should be quite reasonable and per standard of care guidelines.  He will continue to follow up with Dr. Ortiz on a yearly basis from the Endocrinology standpoint     Plan:   MRI abdomen, CBC/CMP and chromogranin in 1 year from now  Follow up with Dr. Hanley after a week after imaging has been scheduled.     Discussed with Dr. Dayan Kaur MD  Hem/Onc fellow    MEDICAL ONCOLOGY ATTENDING PHYSICIAN ADDENDUM:  I have seen and evaluated this patient with the medical oncology fellow. I have reviewed and edited this fellow's note, and agree with the assessment and plan stated above. A complete review of systems was assessed, and pertinent positives/negatives are discussed in detail above and as follows.    VIDEO VISIT       Mr. Guerrero joins me via virtual video visit in the setting of the ongoing coronavirus pandemic and precautionary stay-at-home orders.  He is in his home, and I am in my academic office during the course of this encounter.  He generally is feeling well.  He confirms he is not having any dizziness or lightheadedness or any symptoms that were associated with hyperglycemia in the months to years leading up to the diagnosis of insulinoma.  At the end of next week, he will celebrate 4 years out from the Whipple procedure performed by Dr. Armenta to resect the low-risk neuroendocrine tumor that represents the patient's insulinoma.  Ki-67 was less than 3%.  There were no poor prognostic features.  He generally is feeling well.  I advised at this point being 4 years out from resection of the tumor without evidence of progression today, that we can go forward with an MRI abdomen and chromogranin check in 1 year's time, and he will alert us should he develop any worsening symptoms or new symptoms in the interim.  He will follow with Dr. Ortiz from Endocrinology as well.  I will check a chromogranin and I will defer to Dr. Ortiz for ordering the proinsulin and C-peptide levels as she has been doing for continued monitoring.         VIRTUAL VISIT - DETAILS:    I have reviewed the note as documented above. This accurately captures the substance of my conversation with the patient.    Date of call: November 30, 2020   Start of call:  9:00 am  End of call:  9:20 am    Provider location: [facility] (academic office)  Patient location: Home      Mode of Video Visit: [facility]      I spent 20 minutes prior to the visit reviewing updated information regarding this case, including notes, imaging, labs, and other pertinent results.      Brandan Hanley MD PhD

## 2020-11-30 NOTE — LETTER
"    11/30/2020         RE: Joey Guerrero  [address]  [place] [address]        Dear Colleague,    Thank you for referring your patient, Joey Guerrero, to the [facility]. Please see a copy of my visit note below.    Joey Guerrero is a 45 year old male who is being evaluated via a billable video visit.      The patient has been notified of following:     \"This video visit will be conducted via a call between you and your physician/provider. We have found that certain health care needs can be provided without the need for an in-person physical exam.  This service lets us provide the care you need with a video conversation.  If a prescription is necessary we can send it directly to your pharmacy.  If lab work is needed we can place an order for that and you can then stop by our lab to have the test done at a later time.    Video visits are billed at different rates depending on your insurance coverage.  Please reach out to your insurance provider with any questions.    If during the course of the call the physician/provider feels a video visit is not appropriate, you will not be charged for this service.\"    Patient has given verbal consent for Video visit? Yes  How would you like to obtain your AVS? Mail a copy  If you are dropped from the video visit, the video invite should be resent to: Text to cell phone: [phone]  Will anyone else be joining your video visit? No      Vitals - Patient Reported  Weight (Patient Reported): 113.4 kg (250 lb)  Pain Score: No Pain (0)        I have reviewed and updated patient's allergy and medication list.    Concerns: None  Refills: None      Kaela Tolentino CMA      Video-Visit Details    Type of service:  Video Visit    Video visit duration 30 min    Originating Location (pt. Location): Home    Distant Location (provider location):  [facility]     Platform used for Video Visit: [facility]      Oncology Follow-up " Visit:  Nov 30, 2020    Diagnosis: hL1T6T5 pancreatic head insulinoma.  No evidence of metastatic disease.     Treatment to date: Whipple resection December 8, 2016, by Dr. Matthias Armenta. Zero of 10 lymph nodes involved, grade 2, negative resection margins, Ki-67 equals 5%.  preoperative serum chromogranin A = 70 on 12/05/2016.     REFERRING PHYSICIAN:  Dr. Matthias Armenta, Hepatobiliary Surgery, [facility].      Oncology History:  Over a 2-3 year period, beginning in 2014 or so, he was having hypoglycemic episodes of unexplained origin.  He was even hospitalized for one such episode in the fall of 2016.  He met with a primary care physician, and subsequently an endocrinologist.  There was concern for hypoglycemic cause and concern for potential insulinoma.  On 10/31/2016, MRI at [facility] revealed a 3.9 x 3.7 x 3.6 cm mass in the head of the pancreas.  There was involvement of the superior mesenteric vein, but no evidence of metastatic disease noted.  He met with Dr. Migel Sena at [facility], and a referral was made for surgical evaluation.      On 11/11/2016, a CT abdomen and pelvis was performed revealing an enhancing lesion with a cystic component versus central necrosis in the head and the uncinate process of the pancreas.  There was an indeterminate portacaval lymph node measuring 2.1 cm.  Otherwise, no evidence of suspicious adenopathy in the abdomen.  EUS was performed by Dr. Guru Canas at the [facility] on 11/18/2016.  He performed an FNA.  This revealed the tumor was composed of grade 1 neuroendocrine tumor with a low proliferation marker of Ki-67 less than 2%.  The tumor was positive for synaptophysin, chromogranin and CD56.      On 12/01/2016, the patient underwent a preoperative octreotide scan that did not show any evidence of metastatic disease or tumor.  There was no radiotracer uptake.  A baseline chromogranin A was 70.      His C peptide at baseline was  0.7.  Insulin level at the preoperative baseline was 2.2 and 3.5.  Proinsulin levels were less than 7.5, and a subsequent level was 9.6.     On 12/05/2016, the patient met with Dr. Matthias Armenta, who took the patient to the operating room on 12/08/2016 to perform a Whipple.  The pancreaticoduodenectomy resulted in the revealing of a pancreatic neuroendocrine tumor at the head of the pancreas that measured 5.1 cm with intermediate grade G2.  Margins were negative by 0.1 cm.  Zero of 12 lymph nodes were involved.  The Ki-67 registered at 5% with 4 mitoses/10 high-power fields.  There was no perivascular, nor perineural invasion.  The pathologic stage was T2.  The pathologic N stage was N0, and M0 per the prior octreotide scan.  The patient is currently 9 weeks out from surgery.    2/6/17 - - medical oncology consultation, Dr. Hanley.  6/5/17 - - MRI abdomen: IMPRESSION:   1. Postsurgical of Whipple procedure evidence of locally recurrent  neuroendocrine tumor.  2. 1.2 cm ill-defined hepatic lesion in the central liver. This is  indeterminate and could represent sequela of recent episode of  inflammation or infection. However, it is new from prior and  metastases are not excluded. Consider short-term follow-up MRI.  3. Mildly increased size of pericaval lymph node and unchanged mildly  enlarged randy hepatis lymph node, indeterminate but favored to be  reactive.  6/9/17 - - oncology follow-up, Dr. Hanley.  9/12/17 - - endocrine consultation, Dr. Ortiz. Assessment of insulinoma: recommendation to follow Pro-insulin is a potential useful tumor marker for recurrence, to be followed by endocrinology, in context of glucose and C-peptide levels.  9/12/17 - - MRI abdomen: IMPRESSION: In this patient with history of pancreatic head  insulinoma, post Whipple resection there is no evidence for metastatic  disease within the abdomen. Previously described lesion in the central  liver near the IVC is not well characterized in the  present study.  9/15/17 - - oncology follow-up, Dr. Hanley.  1/16/18 - - MRI abdomen: IMPRESSION:   In this patient with history of insulinoma in the pancreatic head  status post Whipple procedure:  1. No evidence of metastatic disease within the abdomen.  2. Mild pancreatic duct ectasia measuring up to 6 mm. This is  unchanged to minimally increased when compared to the prior exam and  of uncertain clinical significance. No significant associated T1  abnormality to suggest pancreatitis.  1/19/18 - - oncology follow-up, Dr. Hanley.  5/10/18 - - MRI abdomen IMPRESSION: In this patient with history of neuroendocrine tumor of  the pancreas and subsequent Whipple procedure:  1. Slightly increased size of a precaval lymph node, currently  measuring 1.5 x 2.0 cm comparison measured 1.3 x 1.6 cm on 1/16/2018  exam. This is indeterminate  2. Mild persistent dilatation of the main pancreatic duct measuring 6mm.    5/14/18 - - oncology follow-up, Dr. Hanley.  6/12/18 - - patient was no-show for endocrinology visit with Dr. Ortiz.  10/18/18 - - MRI abdomen: IMPRESSION:   1. In this patient with history of pancreatic insulinoma status post  Whipple procedure, there is no evidence of locally recurrent or  metastatic disease.  2. Stable prominent portacaval lymph node.  10/22/18 - - oncology follow-up, Dr. Hanley.  4/18/19 - - MRI abdomen: IMPRESSION: Postoperative changes from Whipple procedure for  insulinoma without evidence of recurrent disease in the abdomen.  4/22/19 - - oncology follow-up, Dr. Hanley.    10/17/19--MRI abdomen IMPRESSION:   1-Stable Postoperative changes from Whipple procedure for insulinoma  without evidence of recurrent disease in the abdomen.   2-Stable Oliva hernia containing nondilated short segment of small  bowel.   10/28/19--oncology follow-up, Dr. Hanley.    4/24/20--MRI abdomen - IMPRESSION:  1. Postsurgical changes of Whipple procedure with resection of  insulinoma. No evidence of recurrent disease.  2.  Unchanged small right paramedian Oliva hernia containing a loop  of nondilated noninflamed small bowel.    5/1/20--oncology follow-up, Dr. Hanley.      11/20/20 MRI abdomen IMPRESSION:  1. Postsurgical changes of Whipple procedure with resection of  insulinoma. No evidence of residual/recurrent disease. No definite  metastasis in the abdomen and pelvis.  2. Unchanged small midline abdominal wall Oliva hernia containing a  loop of nondilated noninflamed small bowel.  3. Unchanged prominent portocaval lymph node, measuring 1.7 cm.  4. Hepatomegaly.     11/20/20 Chromogranin 60 ng/ml    11/30/2020 - follow up appointment with Dr. Hanley         Interim History/History Of Present Illness:  Mr. Joey Guerrero is a 45 year old  man with pancreatic head insulinoma resected by Dr. Armenta on December 8, 2016.    Mr. Guerrero joined me for a virtual visit via video visit due to ongoing coronavirus pandemic and the stay-at-home order.  Wife is also present.  He generally is doing well.  Last seen by Dr. Hanley in summer.  At the current time, he is about 4 years out from the Whipple procedure performed by Dr. Armenta on 12/08/2016.  The MRI abdomen that was done for routine followup on 11/202/2020 shows no evidence of recurrent disease. The patient confirms for me that he is not having any of the symptoms that he presented with initially that led to the diagnosis of insulinoma several years ago, including lack of dizziness or any other signs of hypoglycemia or labile blood sugar.  He is generally feeling well.  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       Review Of Systems:  Full 14-point ROS reviewed. Pertinent symptoms reviewed above per HPI.    Past medical, social, surgical, and family histories reviewed.     PAST MEDICAL HISTORY:  Pancreatic neuroendocrine tumor, pathologic T2 N0 M0 per above, status post Whipple surgery on 12/08/2016.  Other surgeries notable include an appendectomy in 2007.      FAMILY  HISTORY:  Denies any family history of malignancy.      SOCIAL HISTORY:  The patient lives in [place], [place], with his wife.  They have 2 boys, ages 8 and 4.  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    Tobacco:  Used to chew and smoke occasional cigarettes since the age of 18 until recently.    Alcohol:  Denies alcohol abuse.    Drugs:  Denies illicit drug use.     Allergies:  Allergies as of 11/30/2020 - Reviewed 05/01/2020   Allergen Reaction Noted     Cat hair extract  12/05/2016     Pollen extract  12/05/2016       Current Medications:  Current Outpatient Medications   Medication Sig Dispense Refill     albuterol (PROAIR HFA, PROVENTIL HFA, VENTOLIN HFA) 108 (90 BASE) MCG/ACT inhaler Inhale 1-2 puffs into the lungs every 6 hours as needed for shortness of breath / dyspnea or wheezing       amylase-lipase-protease (CREON 6) 6000 units CPEP Take 1-2 capsules (6,000-12,000 Units) by mouth 3 times daily (with meals) 450 capsule 3     Ascorbic Acid (VITAMIN C PO) Take by mouth 2 times daily        ASPIRIN PO Take 81 mg by mouth       Cholecalciferol (VITAMIN D-3 PO) Take by mouth daily Pt unsure of dosage       fluticasone (FLOVENT HFA) 110 MCG/ACT inhaler Inhale 2 puffs into the lungs At Bedtime        Multiple Vitamin (MULTI-VITAMINS) TABS        Omega-3 Fatty Acids (OMEGA-3 FISH OIL PO) Take 1 g by mouth 2 times daily (with meals) 3 capsules       Probiotic Product (PROBIOTIC ADVANCED) CAPS Take by mouth daily       vitamin B complex with vitamin C (VITAMIN  B COMPLEX) TABS tablet Take 1 tablet by mouth daily          Physical Exam:  Physical exam could not be performed today in context of a Virtual Visit during the COVID19/Coronavirus pandemic.    Observed physical assessments made today by visualizing the patient by video link:    General/Constitutional: Generally appears well, not acutely ill.  HEENT: no scleral icterus, not jaundiced.  Respiratory: no labored breathing.  Musculoskeletal:  appears to have full range of motion and adequate physical strength.  Skin: no jaundice, discoloration or other notable lesions.  Neurological: no evidence of tremors.  Psychiatric: no evident anxiety; fully alert and oriented with fluent speech.      The rest of a comprehensive physical examination is deferred due to PHE (public health emergency) video visit restrictions.    Laboratory/Imaging Studies.  I [facility] the pertinent recent [facility] in advance of this visit, and reviewed the summary verbatim and in lay language during today's call.      ASSESSMENT/PLAN:  45 year old man with pancreatic head insulinoma, who presented with hypoglycemic episodes for several years prior to diagnosis. He had Whipple surgery by Dr. Matthias Armenta on 12/08/2016 (pT2NM0).     He is almost 4 years out from his definitive surgery performed by Dr. Matthias Armenta for his pancreatic head insulinoma.  He has relatively low risk with relatively low Ki-67 index.  He had negative margins.  It was grade 2 at the time with negative lymph node involvement.  Chromogranin A markers have been unremarkable.     MRI done earlier this month with no evidence of recurrent malignancy. The patient is at relatively low risk as noted above. We recommended continued active surveillance. We will go forward with the MRI abdomen and chromogranin A check as well as a CMP in a year from now.  Again, he is at relatively low risk, and being several years out, that timeframe should be quite reasonable and per standard of care guidelines.  He will continue to follow up with Dr. Ortiz on a yearly basis from the Endocrinology standpoint     Plan:   MRI abdomen, CBC/CMP and chromogranin in 1 year from now  Follow up with Dr. Hanley after a week after imaging has been scheduled.     Discussed with Dr. Dayan Kaur MD  Hem/Onc fellow    MEDICAL ONCOLOGY ATTENDING PHYSICIAN ADDENDUM:  I have seen and evaluated this patient with the medical  oncology fellow. I have reviewed and edited this fellow's note, and agree with the assessment and plan stated above. A complete review of systems was assessed, and pertinent positives/negatives are discussed in detail above and as follows.    VIDEO VISIT      Mr. Guerrero joins me via virtual video visit in the setting of the ongoing coronavirus pandemic and precautionary stay-at-home orders.  He is in his home, and I am in my academic office during the course of this encounter.  He generally is feeling well.  He confirms he is not having any dizziness or lightheadedness or any symptoms that were associated with hyperglycemia in the months to years leading up to the diagnosis of insulinoma.  At the end of next week, he will celebrate 4 years out from the Whipple procedure performed by Dr. Armenta to resect the low-risk neuroendocrine tumor that represents the patient's insulinoma.  Ki-67 was less than 3%.  There were no poor prognostic features.  He generally is feeling well.  I advised at this point being 4 years out from resection of the tumor without evidence of progression today, that we can go forward with an MRI abdomen and chromogranin check in 1 year's time, and he will alert us should he develop any worsening symptoms or new symptoms in the interim.  He will follow with Dr. Ortiz from Endocrinology as well.  I will check a chromogranin and I will defer to Dr. Ortiz for ordering the proinsulin and C-peptide levels as she has been doing for continued monitoring.         VIRTUAL VISIT - DETAILS:    I have reviewed the note as documented above. This accurately captures the substance of my conversation with the patient.    Date of call: November 30, 2020   Start of call:  9:00 am  End of call:  9:20 am    Provider location: [facility] (academic office)  Patient location: Home      Mode of Video Visit: Thang      I spent 20 minutes prior to the visit reviewing updated information regarding  this case, including notes, imaging, labs, and other pertinent results.      Brandan Hanley MD PhD

## 2021-01-07 ENCOUNTER — PATIENT OUTREACH (OUTPATIENT)
Dept: ONCOLOGY | Facility: CLINIC | Age: 46
End: 2021-01-07

## 2021-01-07 NOTE — TELEPHONE ENCOUNTER
RN Care Coordination Note  Incoming Call:   Patient is being offered the Moderna covid vaccine through his work. He is asking if Dr. Hanley recommends he receive the vaccine at this time? 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 Message sent to Dr. Hanley    Provider Response:  Brandan Hanley MD Allard, Kathy, RN; Valentina Albarado, RN   Thanks - yes, he should be fine to get it; he is in a high-risk job. Regarding asthma, he should check with his PCP but I don't know of any contra-indication between asthma and the COVID vaccine.  Outgoing Call:   Called patient, relayed entire message above. He verbalized understanding and will call his PCP regarding any contra-indications between asthma and the COVID vaccine.    Donna Herbert RN, BSN  Care Coordinator   [facility]

## 2021-01-15 ENCOUNTER — HEALTH MAINTENANCE LETTER (OUTPATIENT)
Age: 46
End: 2021-01-15

## 2021-09-11 ENCOUNTER — HEALTH MAINTENANCE LETTER (OUTPATIENT)
Age: 46
End: 2021-09-11

## 2021-10-26 ENCOUNTER — TELEPHONE (OUTPATIENT)
Dept: ENDOCRINOLOGY | Facility: CLINIC | Age: 46
End: 2021-10-26

## 2021-10-26 NOTE — TELEPHONE ENCOUNTER
Health Call Center    Phone Message    May a detailed message be left on voicemail: yes     Reason for Call: Order(s): Other:   Reason for requested: Pt is scheduled for their next appt on 21, and is wanting to get labs done prior to that appt. Current orders have . Please place orders for any lab work needed, and call Pt back to schedule lab appt.  Date needed: prior to 21  Provider name: Angel      Action Taken: Message routed to:  [facility] ([facility] endocrine    Travel Screening: Not Applicable

## 2021-10-28 DIAGNOSIS — D49.0 PANCREATIC TUMOR: Primary | ICD-10-CM

## 2021-10-28 DIAGNOSIS — D13.7 INSULINOMA: ICD-10-CM

## 2021-11-19 ENCOUNTER — ANCILLARY PROCEDURE (OUTPATIENT)
Dept: MRI IMAGING | Facility: CLINIC | Age: 46
End: 2021-11-19
Attending: INTERNAL MEDICINE
Payer: COMMERCIAL

## 2021-11-19 ENCOUNTER — LAB (OUTPATIENT)
Dept: LAB | Facility: CLINIC | Age: 46
End: 2021-11-19
Payer: COMMERCIAL

## 2021-11-19 DIAGNOSIS — D13.7 INSULINOMA: ICD-10-CM

## 2021-11-19 DIAGNOSIS — D49.0 PANCREATIC TUMOR: ICD-10-CM

## 2021-11-19 LAB
ALBUMIN SERPL-MCNC: 3.9 G/DL (ref 3.4–5)
ALP SERPL-CCNC: 27 U/L (ref 40–150)
ALT SERPL W P-5'-P-CCNC: 78 U/L (ref 0–70)
ANION GAP SERPL CALCULATED.3IONS-SCNC: 7 MMOL/L (ref 3–14)
AST SERPL W P-5'-P-CCNC: 53 U/L (ref 0–45)
BASOPHILS # BLD AUTO: 0 10E3/UL (ref 0–0.2)
BASOPHILS NFR BLD AUTO: 1 %
BILIRUB SERPL-MCNC: 1 MG/DL (ref 0.2–1.3)
BUN SERPL-MCNC: 27 MG/DL (ref 7–30)
C PEPTIDE SERPL-MCNC: 0.6 NG/ML (ref 0.9–6.9)
CALCIUM SERPL-MCNC: 9.3 MG/DL (ref 8.5–10.1)
CHLORIDE BLD-SCNC: 103 MMOL/L (ref 94–109)
CO2 SERPL-SCNC: 28 MMOL/L (ref 20–32)
CREAT SERPL-MCNC: 1.01 MG/DL (ref 0.66–1.25)
EOSINOPHIL # BLD AUTO: 0 10E3/UL (ref 0–0.7)
EOSINOPHIL NFR BLD AUTO: 0 %
ERYTHROCYTE [DISTWIDTH] IN BLOOD BY AUTOMATED COUNT: 12.7 % (ref 10–15)
GFR SERPL CREATININE-BSD FRML MDRD: 88 ML/MIN/1.73M2
GLUCOSE BLD-MCNC: 98 MG/DL (ref 70–99)
HBA1C MFR BLD: 5.6 % (ref 0–5.6)
HCT VFR BLD AUTO: 38.4 % (ref 40–53)
HGB BLD-MCNC: 12.6 G/DL (ref 13.3–17.7)
IMM GRANULOCYTES # BLD: 0 10E3/UL
IMM GRANULOCYTES NFR BLD: 0 %
LYMPHOCYTES # BLD AUTO: 1.3 10E3/UL (ref 0.8–5.3)
LYMPHOCYTES NFR BLD AUTO: 30 %
MCH RBC QN AUTO: 31.1 PG (ref 26.5–33)
MCHC RBC AUTO-ENTMCNC: 32.8 G/DL (ref 31.5–36.5)
MCV RBC AUTO: 95 FL (ref 78–100)
MONOCYTES # BLD AUTO: 0.4 10E3/UL (ref 0–1.3)
MONOCYTES NFR BLD AUTO: 9 %
NEUTROPHILS # BLD AUTO: 2.7 10E3/UL (ref 1.6–8.3)
NEUTROPHILS NFR BLD AUTO: 60 %
NRBC # BLD AUTO: 0 10E3/UL
NRBC BLD AUTO-RTO: 0 /100
PLATELET # BLD AUTO: 186 10E3/UL (ref 150–450)
POTASSIUM BLD-SCNC: 4.2 MMOL/L (ref 3.4–5.3)
PROT SERPL-MCNC: 7 G/DL (ref 6.8–8.8)
RBC # BLD AUTO: 4.05 10E6/UL (ref 4.4–5.9)
SODIUM SERPL-SCNC: 138 MMOL/L (ref 133–144)
WBC # BLD AUTO: 4.5 10E3/UL (ref 4–11)

## 2021-11-19 PROCEDURE — 36415 COLL VENOUS BLD VENIPUNCTURE: CPT | Performed by: PATHOLOGY

## 2021-11-19 PROCEDURE — 80053 COMPREHEN METABOLIC PANEL: CPT | Performed by: PATHOLOGY

## 2021-11-19 PROCEDURE — 84681 ASSAY OF C-PEPTIDE: CPT | Mod: 90 | Performed by: PATHOLOGY

## 2021-11-19 PROCEDURE — 99000 SPECIMEN HANDLING OFFICE-LAB: CPT | Performed by: PATHOLOGY

## 2021-11-19 PROCEDURE — 84206 ASSAY OF PROINSULIN: CPT | Mod: 90 | Performed by: PATHOLOGY

## 2021-11-19 PROCEDURE — 85025 COMPLETE CBC W/AUTO DIFF WBC: CPT | Performed by: PATHOLOGY

## 2021-11-19 PROCEDURE — 83036 HEMOGLOBIN GLYCOSYLATED A1C: CPT | Performed by: PATHOLOGY

## 2021-11-19 PROCEDURE — A9585 GADOBUTROL INJECTION: HCPCS | Performed by: RADIOLOGY

## 2021-11-19 PROCEDURE — 74183 MRI ABD W/O CNTR FLWD CNTR: CPT | Performed by: RADIOLOGY

## 2021-11-19 PROCEDURE — 86316 IMMUNOASSAY TUMOR OTHER: CPT | Mod: 90 | Performed by: PATHOLOGY

## 2021-11-19 RX ORDER — GADOBUTROL 604.72 MG/ML
10 INJECTION INTRAVENOUS ONCE
Status: COMPLETED | OUTPATIENT
Start: 2021-11-19 | End: 2021-11-19

## 2021-11-19 RX ORDER — GADOBUTROL 604.72 MG/ML
10 INJECTION INTRAVENOUS ONCE
Status: DISCONTINUED | OUTPATIENT
Start: 2021-11-19 | End: 2021-11-19

## 2021-11-19 RX ADMIN — GADOBUTROL 10 ML: 604.72 INJECTION INTRAVENOUS at 10:03

## 2021-11-19 NOTE — DISCHARGE INSTRUCTIONS
MRI Contrast Discharge Instructions    The IV contrast you received today will pass out of your body in your  urine. This will happen in the next 24 hours. You will not feel this process.  Your urine will not change color.    Drink at least 4 extra glasses of water or juice today (unless your doctor  has restricted your fluids). This reduces the stress on your kidneys.  You may take your regular medicines.    If you are on dialysis: It is best to have dialysis today.    If you have a reaction: Most reactions happen right away. If you have  any new symptoms after leaving the hospital (such as hives or swelling),  call your hospital at the correct number below. Or call your family doctor.  If you have breathing distress or wheezing, call 911.    Special instructions: ***    I have read and understand the above information.    Signature:______________________________________ Date:___________    Staff:__________________________________________ Date:___________     Time:__________    [facility] Departments:    ___[facility]: [phone]  ___[facility]: [phone]  ___[place]: [phone] ___[facility]: [phone]  ___[facility]: [phone]  ___[facility]: [phone]  ___[phone]  ___[facility]: [phone]  ___[phone]

## 2021-11-21 LAB — CGA SERPL-MCNC: 57 NG/ML

## 2021-11-22 NOTE — PROGRESS NOTES
Joey is a 46 year old who is being evaluated via a billable video visit.      How would you like to obtain your AVS? MyChart  If the video visit is dropped, the invitation should be resent by: Text to cell phone: [phone]  Will anyone else be joining your video visit? No            Oncology follow-up Visit:  Nov 29, 2021    Diagnosis: [id number] pancreatic head insulinoma.  No evidence of metastatic disease.     Treatment to date: Whipple resection December 8, 2016, by Dr. Matthias Armenta. Zero of 10 lymph nodes involved, grade 2, negative resection margins, Ki-67 equals 5%.  preoperative serum chromogranin A = 70 on 12/05/2016.     COVID VACCINATION STATUS: confirmed 11/29/21.    REFERRING PHYSICIAN:  Dr. Matthias Armenta, Hepatobiliary Surgery, [facility].      Oncology History:  Over a 2-3 year period, beginning in 2014 or so, he was having hypoglycemic episodes of unexplained origin.  He was even hospitalized for one such episode in the fall of 2016.  He met with a primary care physician, and subsequently an endocrinologist.  There was concern for hypoglycemic cause and concern for potential insulinoma.  On 10/31/2016, MRI at [facility] revealed a 3.9 x 3.7 x 3.6 cm mass in the head of the pancreas.  There was involvement of the superior mesenteric vein, but no evidence of metastatic disease noted.  He met with Dr. Migel Sena at [facility], and a referral was made for surgical evaluation.      On 11/11/2016, a CT abdomen and pelvis was performed revealing an enhancing lesion with a cystic component versus central necrosis in the head and the uncinate process of the pancreas.  There was an indeterminate portacaval lymph node measuring 2.1 cm.  Otherwise, no evidence of suspicious adenopathy in the abdomen.  EUS was performed by Dr. Guru Canas at the [facility] on 11/18/2016.  He performed an FNA.  This revealed the tumor was composed of grade 1 neuroendocrine tumor with a  low proliferation marker of Ki-67 less than 2%.  The tumor was positive for synaptophysin, chromogranin and CD56.      On 12/01/2016, the patient underwent a preoperative octreotide scan that did not show any evidence of metastatic disease or tumor.  There was no radiotracer uptake.  A baseline chromogranin A was 70.      His C peptide at baseline was 0.7.  Insulin level at the preoperative baseline was 2.2 and 3.5.  Proinsulin levels were less than 7.5, and a subsequent level was 9.6.     On 12/05/2016, the patient met with Dr. Matthias Armenta, who took the patient to the operating room on 12/08/2016 to perform a Whipple.  The pancreaticoduodenectomy resulted in the revealing of a pancreatic neuroendocrine tumor at the head of the pancreas that measured 5.1 cm with intermediate grade G2.  Margins were negative by 0.1 cm.  Zero of 12 lymph nodes were involved.  The Ki-67 registered at 5% with 4 mitoses/10 high-power fields.  There was no perivascular, nor perineural invasion.  The pathologic stage was T2.  The pathologic N stage was N0, and M0 per the prior octreotide scan.  The patient is currently 9 weeks out from surgery.    2/6/17 - - medical oncology consultation, Dr. Hanley.  6/5/17 - - MRI abdomen: IMPRESSION:   1. Postsurgical of Whipple procedure evidence of locally recurrent  neuroendocrine tumor.  2. 1.2 cm ill-defined hepatic lesion in the central liver. This is  indeterminate and could represent sequela of recent episode of  inflammation or infection. However, it is new from prior and  metastases are not excluded. Consider short-term follow-up MRI.  3. Mildly increased size of pericaval lymph node and unchanged mildly  enlarged randy hepatis lymph node, indeterminate but favored to be  reactive.  6/9/17 - - oncology follow-up, Dr. Hanley.  9/12/17 - - endocrine consultation, Dr. Ortiz. Assessment of insulinoma: recommendation to follow Pro-insulin is a potential useful tumor marker for recurrence, to be  followed by endocrinology, in context of glucose and C-peptide levels.  9/12/17 - - MRI abdomen: IMPRESSION: In this patient with history of pancreatic head  insulinoma, post Whipple resection there is no evidence for metastatic  disease within the abdomen. Previously described lesion in the central  liver near the IVC is not well characterized in the present study.  9/15/17 - - oncology follow-up, Dr. Hanley.  1/16/18 - - MRI abdomen: IMPRESSION:   In this patient with history of insulinoma in the pancreatic head  status post Whipple procedure:  1. No evidence of metastatic disease within the abdomen.  2. Mild pancreatic duct ectasia measuring up to 6 mm. This is  unchanged to minimally increased when compared to the prior exam and  of uncertain clinical significance. No significant associated T1  abnormality to suggest pancreatitis.  1/19/18 - - oncology follow-up, Dr. Hanley.  5/10/18 - - MRI abdomen IMPRESSION: In this patient with history of neuroendocrine tumor of  the pancreas and subsequent Whipple procedure:  1. Slightly increased size of a precaval lymph node, currently  measuring 1.5 x 2.0 cm comparison measured 1.3 x 1.6 cm on 1/16/2018  exam. This is indeterminate  2. Mild persistent dilatation of the main pancreatic duct measuring 6mm.    5/14/18 - - oncology follow-up, Dr. Hanley.  6/12/18 - - patient was no-show for endocrinology visit with Dr. Ortiz.  10/18/18 - - MRI abdomen: IMPRESSION:   1. In this patient with history of pancreatic insulinoma status post  Whipple procedure, there is no evidence of locally recurrent or  metastatic disease.  2. Stable prominent portacaval lymph node.  10/22/18 - - oncology follow-up, Dr. Hanley.  4/18/19 - - MRI abdomen: IMPRESSION: Postoperative changes from Whipple procedure for  insulinoma without evidence of recurrent disease in the abdomen.  4/22/19 - - oncology follow-up, Dr. Hanley.    10/17/19--MRI abdomen IMPRESSION:   1-Stable Postoperative changes from Whipple  procedure for insulinoma  without evidence of recurrent disease in the abdomen.   2-Stable Oliva hernia containing nondilated short segment of small  bowel.   10/28/19--oncology follow-up, Dr. Hanley.    4/24/20--MRI abdomen - IMPRESSION:  1. Postsurgical changes of Whipple procedure with resection of  insulinoma. No evidence of recurrent disease.  2. Unchanged small right paramedian Oliva hernia containing a loop  of nondilated noninflamed small bowel.    5/1/20--oncology follow-up, Dr. Hanley.      11/20/20 MRI abdomen IMPRESSION:  1. Postsurgical changes of Whipple procedure with resection of  insulinoma. No evidence of residual/recurrent disease. No definite  metastasis in the abdomen and pelvis.  2. Unchanged small midline abdominal wall Oliva hernia containing a  loop of nondilated noninflamed small bowel.  3. Unchanged prominent portocaval lymph node, measuring 1.7 cm.  4. Hepatomegaly.     11/20/20 Chromogranin 60 ng/ml    11/30/2020 - follow up appointment with Dr. Hanley     11/19/21--MRI abdomen - IMPRESSION:  This patient with history of insulinoma status post resection:  1. Postsurgical changes of Whipple procedure without evidence for  recurrent tumor or metastatic disease.  2. Unchanged small midline abdominal wall Oliva hernia containing a  loop of nondilated noninflamed small bowel.  3. Decrease in size of the portacaval enlarged lymph node.    November 29, 2021 -- oncology follow-up/virtual visit, Dr. Hanley.        Interim History/History Of Present Illness:  Mr. Joey Guerrero is a 46 year old  man with pancreatic head insulinoma resected by Dr. Armenta on December 8, 2016.    He joins me from home along with his wife.  He is generally doing well.  He works as a  in the community.  He confirms he is vaccinated for COVID vaccination protection against the pandemic related infection.  Their children, ages 9 and 13, are back in school and they are very happy about that.  Overall, he  presented with significant symptoms including tingling and other symptoms of hypoglycemia when he was first diagnosed with insulinoma over 5 years ago.  Coming up next week will joyce 5 years since his Whipple resection of the pancreatic insulinoma by my surgical colleague, Dr. Armenta.  He overall is feeling well.  He confirms he has not had any symptoms resembling the initial set of symptoms since his surgery 5 years ago.      Over the summer, he had some mild tingling but without any dizziness or any other presyncopal or syncopal symptoms.  He does not know whether his blood sugar was particular low on that date.  Otherwise, he has been rather unremarkable and able to work without difficulty.  He met with Dr. Ortiz mid last week and had endocrine followup.  He had an MRI abdomen 11/19/2021 that showed no evidence of recurrent malignancy.  He stated that Dr. Ortiz has some concern about enlarged liver that is 20 cm in craniocaudal dimension.  I used the opportunity to ask about alcohol intake.  He states initially that he does not take alcohol regularly but drinks 12 beers on the weekends of unspecified duration.            Results for YESENIA TORRES (MRN [id number]) as of 11/29/2021 09:07   Ref. Range 10/18/2018 12:36 4/18/2019 13:20 10/17/2019 11:35 4/24/2020 12:14 11/19/2021 08:45   Chromogranin A Latest Ref Range: 0 - 103 ng/mL 55 52 63 67 57     Review Of Systems:  Full 14-point ROS reviewed. Pertinent symptoms reviewed above per HPI.    Past medical, social, surgical, and family histories reviewed.     PAST MEDICAL HISTORY:  Pancreatic neuroendocrine tumor, pathologic T2 N0 M0 per above, status post Whipple surgery on 12/08/2016.  Other surgeries notable include an appendectomy in 2007.      FAMILY HISTORY:  Denies any family history of malignancy.      SOCIAL HISTORY:  The patient lives in [place], [place], with his wife.  They have 2 boys, ages 9 and 13 as of Nov 2021.   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    Tobacco:  Used to chew and smoke occasional cigarettes since the age of 18 until recently.    Alcohol:  As of Nov 2021: 6-12 beers per weekend, unspecified number of years.   Drugs:  Denies illicit drug use.     Allergies:  Allergies as of 11/29/2021 - Reviewed 11/30/2020   Allergen Reaction Noted     Cat hair extract  12/05/2016     Pollen extract  12/05/2016       Current Medications:  Current Outpatient Medications   Medication Sig Dispense Refill     albuterol (PROAIR HFA, PROVENTIL HFA, VENTOLIN HFA) 108 (90 BASE) MCG/ACT inhaler Inhale 1-2 puffs into the lungs every 6 hours as needed for shortness of breath / dyspnea or wheezing       amylase-lipase-protease (CREON 6) 6000 units CPEP Take 1-2 capsules (6,000-12,000 Units) by mouth 3 times daily (with meals) 450 capsule 3     Ascorbic Acid (VITAMIN C PO) Take by mouth 2 times daily        ASPIRIN PO Take 81 mg by mouth       Cholecalciferol (VITAMIN D-3 PO) Take by mouth daily Pt unsure of dosage       fluticasone (FLOVENT HFA) 110 MCG/ACT inhaler Inhale 2 puffs into the lungs At Bedtime        Multiple Vitamin (MULTI-VITAMINS) TABS        Omega-3 Fatty Acids (OMEGA-3 FISH OIL PO) Take 1 g by mouth 2 times daily (with meals) 3 capsules       Probiotic Product (PROBIOTIC ADVANCED) CAPS Take by mouth daily       vitamin B complex with vitamin C (VITAMIN  B COMPLEX) TABS tablet Take 1 tablet by mouth daily          Physical Exam:  Physical exam could not be performed today in context of a Virtual Visit during the COVID19/Coronavirus pandemic.  Vitals - Patient Reported  Weight (Patient Reported): 106.6 kg (235 lb)  Pain Score: No Pain (0)         Observed physical assessments made today by visualizing the patient by video link:    General/Constitutional: Generally appears well, not acutely ill.  HEENT: no scleral icterus, not jaundiced.  Respiratory: no labored breathing.  Musculoskeletal: appears to have full range of motion  and adequate physical strength.  Skin: no jaundice, discoloration or other notable lesions.  Neurological: no evidence of tremors.  Psychiatric: no evident anxiety; fully alert and oriented with fluent speech.      The rest of a comprehensive physical examination is deferred due to PHE (public health emergency) video visit restrictions.    Laboratory/Imaging Studies.  Prior to and including the day of this visit, I personally reviewed the recent imaging scans. I released the pertinent recent imaging results to [employer] in advance of this visit, and reviewed the summary verbatim and in lay language during today's call.  Results for YESENIA TORRES (MRN [id number]) as of 11/29/2021 09:07   Ref. Range 10/18/2018 12:36 4/18/2019 13:20 10/17/2019 11:35 4/24/2020 12:14 11/19/2021 08:45   Chromogranin A Latest Ref Range: 0 - 103 ng/mL 55 52 63 67 57       ASSESSMENT/PLAN:  46 year old man with pancreatic head insulinoma, who presented with hypoglycemic episodes for several years prior to diagnosis. He had Whipple surgery by Dr. Matthias Armenta on 12/08/2016 ([id number]).     I congratulated him on being 5 years out from resection of his insulinoma and without recurrence of malignancy.  He is generally doing well.  He is not having any symptoms that preceded the diagnosis of insulinoma that related to high insulin and hypoglycemic levels.  I reviewed with him and the recommendation of continued active surveillance.  We can obtain another MRI abdomen and chromogranin A in 1 year.  Ultimately, if he felt strongly about not pursuing continued surveillance, he can notify us should he develop any concerning symptoms.  He follows my recommendation for MRI abdomen in 1 year.  We look forward to seeing him at that time.      We will get a chromogranin A drawn on the same day.  He will follow up with Dr. Ortiz from the  Endocrinology team as he has been doing and as is indicated.  He met with her last week and he states that she expressed  a concern about the size of his liver.  I reviewed a pattern of potential alcohol abuse.  He binge drinks on the weekends, 6-12 drinks of beer per weekend or per day for weekend for an unspecified number of years.  I stated that alcohol is a risk factor for many types of cancers including potentially hepatocellular carcinoma, cholangiocarcinoma, colorectal cancer and upper GI cancers, among others.  I have recommended caution against excessive use of alcohol and suggestion of better moderation.  He stated full understanding.  I used the opportunity to also suggest he reconnect with his primary care physician for primary care, appropriate screening and testing, including prostate cancer screening and colorectal cancer screening as appropriate after discussion with his PCP and also other noncancer screening related issues such as cholesterol and other issues.  He states he will follow up with primary care physician.  I will look forward to seeing him back in 1 year.            VIRTUAL VISIT - DETAILS:    I have reviewed the note as documented above. This accurately captures the substance of my conversation with the patient.    Date of call: November 29, 2021   Start of call: 9:09 am  End of call: 9:21 am    Provider location: [facility] (academic office)  Patient location: Home      Mode of Video Visit: [facility]           I spent 12 minutes in consultation, including history and discussion with the patient including review of recent lab values and radiologic imaging results.  An additional 10 minutes was spent on the day of the visit, including reviewing pertinent medical notes and documentation from other physicians and APPs who have evaluated and treated this patient, pertinent lab values, pathology and imaging results, personal review of radiologic images, discussing the case with referring providers and/or nurse coordinator team, post-visit orders, and all post-visit documentation.    Brandan Hanley MD  PhD

## 2021-11-23 ENCOUNTER — VIRTUAL VISIT (OUTPATIENT)
Dept: ENDOCRINOLOGY | Facility: CLINIC | Age: 46
End: 2021-11-23
Payer: COMMERCIAL

## 2021-11-23 DIAGNOSIS — R16.0 HEPATOMEGALY: ICD-10-CM

## 2021-11-23 DIAGNOSIS — Z98.890 HISTORY OF PANCREATIC SURGERY: Primary | ICD-10-CM

## 2021-11-23 DIAGNOSIS — D13.7 INSULINOMA: ICD-10-CM

## 2021-11-23 PROBLEM — R73.03 PRE-DIABETES: Status: RESOLVED | Noted: 2017-05-10 | Resolved: 2021-11-23

## 2021-11-23 PROCEDURE — 99214 OFFICE O/P EST MOD 30 MIN: CPT | Mod: GT

## 2021-11-23 NOTE — LETTER
11/23/2021       RE: Joey Guerrero  [address]  [facility] [address]     Dear Colleague,    Thank you for referring your patient, Joey Guerrero, to the [facility] ENDOCRINOLOGY CLINIC [place] at [facility]. Please see a copy of my visit note below.    Joey is a 46 year old who is being evaluated via a billable video visit.      How would you like to obtain your AVS? MyChart  If the video visit is dropped, the invitation should be resent by: Text to cell phone: [phone]  Will anyone else be joining your video visit?   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 Tumor was 5.1 cm, not pathologically  malignant (as defined by distant mets, vascular or capsular invasion).  This does not fully exclude malignancy.   pT2, pNo, pMx, stage IB assuming no distant mets.   There were no elevated  markers measured prior to surgery , which will help us detect recurrence, other than the hypoglycemia/ hyperinsulinemia The primary tumor uptake of octreotide was not zero but it appeared quite low on the octreoscan. The proinsulin seemed to be the same with fasting and non-fasting.  He no longer has symptoms suggestive of hypoglycemia.  His weight is back down , which goes against insulinoma.   I have noted that think that the proinsulin might be a useful tumor marker for him.  We will follow this (always in the context of glucose and c peptide)     S/p Whipple procedure -   on treatment of pancreatic exocrine replacement. This may be a risk for future diabetes development, depending on the volume of pancreatic tissue that was actually removed.      Pre-diabetes in the past.  Recent A1c normal.     Hepatomegaly - and increased LFTS .  Discussed. I have recommended he see his PCP to follow up on this. I suggeseted he hold ETOH for a while to see if  this improves the LFT aabnormality.     Due to the COVID 19 pandemic this visit was a video visit. The patient gave verbal consent for the visit today.    I have independently reviewed and interpreted labs, imaging as indicated.     Chart review/prep time 1  0612-0660  Visit Start time amwel 1459   Visit Stop time 1511  _30_ minutes spent on the date of the encounter doing chart review, history and exam, documentation and further activities as noted above.    Cc/HISTORY OF PRESENT ILLNESS  46- year old man presentsfor follow up of history of insulinoma. I last saw him 1/2020. He continues to follow with Dr Hanley in Oncology and he tells me he has upcoming appt with him as well.  They have continued with frequent imaging.  Since our last visit he has had MRI abdomen x 2, most recently 11/2021.  Both showed hepatomegaly.  Joey was not aware of this today.      See my 5/10/17 note for description of the presentation and diagnostic tests   His treatment course has been as follows:  11/18/16 FNA -EUS pancreatic head mass was positive for malignancy - neuroendocrine tumor.    12/8/16  open nonpylorus preserving Whipple procedure with cholecystectomy and feeding jejunostomy removing  pancreatic neuroendocrine tumor (5.1 cm) , intermediate grade (G2).  0/10 LN negative.  KI -67 in some areas of tumor up to 5% ( the synoptic states there were 10 mitoses /10 HPF, no LV invasion.  No IHC was performed .    Weights  11/18/16 283  9/12/17: 230  1/19/18: 247  1/28/19: 268  1/6/2020: 266  Weight reported today 235    We have been following neuroendocrine tumor markers as follows  6/5/17 :CGA 53, glucose 90  9/12/17: proinsulin < 1.6, C peptide 0.5, HgbA1c 5.1, CGA 62, glucose 88  1/16/18: proinsulin 2.1, C peptide 1.0, CGA 77  5/10/18: CGA 53, glucose 104  10/16/18: CGA 55, glucose 100  1/28/19: proinsulin 3.1 pM (< 8.0), C peptide 1.1, glucose 96,  HgbA1c 6.2% - followed appt,   10/17/19: CGA 63  11/19/2021 fasting   HgbA1c 5.6,  CGA 57, C peptide 0.6, glucose 98, creatinine 1.01, ALT 78, AST 53, alk phos 27, bili 1.0   proinsulin pending    We have the following imaging studies:   10/31/16: [facility] 3.9 cm mass pancreas with central necrosis. Mass encases 50% of adjacent SMV which remains patent. - Liver unremarkable.   11/11/16 CT abdomen: enhancing lesion with cystic component or central necrosis within head and uncinate process of pancreas concerning for malignancy.  1 cm indeterminate portacaval LN.   12/1/16 octreoscan: read as negative; I think the study shows very faint activity at the pancreatic mass site.  6/5/17 MRI abdomen: ? Liver mass  4/18/19and 10/17/19 MRI abdomen: KIKO  11/20/2020 MRI Abdomen: hepatomegaly 23.5 cm   11/19/2021 MRI abdomen: hepatomegaly 20 cm    REVIEW OF SYSTEMS  Feel normal  No spells 0-- ? One spell once 304 months ago  Hadn't eaten for a long time. He felt drained.  He had no other symptoms.    He ate later.    Weight is currently 235 -- attributed to tring to lose weihg in the summer.    Cardiac:   GI: some diarrhea and constipatoin .    No headaches;   No sweating   Sleep at night is OK      Past Medical History  Past Medical History:   Diagnosis Date     Hypoglycemia     due to insulinoma     Migraine      Pancreatic neuroendocrine tumor- insulinoma 2016     Ruptured appendix 2003     Tinea versicolor      Uncomplicated asthma       Past Surgical History:   Procedure Laterality Date     APPENDECTOMY       ENDOSCOPIC ULTRASOUND UPPER GASTROINTESTINAL TRACT (GI) N/A 11/18/2016    Procedure: ENDOSCOPIC ULTRASOUND, ESOPHAGOSCOPY / UPPER GASTROINTESTINAL TRACT (GI);  Surgeon: Guru Malathi Canas MD;  Location: [facility] OR     PICC INSERTION       WHIPPLE PROCEDURE N/A 12/8/2016    Procedure: WHIPPLE PROCEDURE;  Surgeon: Matthias Armenta MD;  Location: [facility] OR     Medications  Current Outpatient Medications   Medication Sig Dispense Refill     albuterol (PROAIR HFA,  PROVENTIL HFA, VENTOLIN HFA) 108 (90 BASE) MCG/ACT inhaler Inhale 1-2 puffs into the lungs every 6 hours as needed for shortness of breath / dyspnea or wheezing       amylase-lipase-protease (CREON 6) 6000 units CPEP Take 1-2 capsules (6,000-12,000 Units) by mouth 3 times daily (with meals) 450 capsule 3     Ascorbic Acid (VITAMIN C PO) Take by mouth 2 times daily        ASPIRIN PO Take 81 mg by mouth       Cholecalciferol (VITAMIN D-3 PO) Take by mouth daily Pt unsure of dosage       fluticasone (FLOVENT HFA) 110 MCG/ACT inhaler Inhale 2 puffs into the lungs At Bedtime        Multiple Vitamin (MULTI-VITAMINS) TABS        Omega-3 Fatty Acids (OMEGA-3 FISH OIL PO) Take 1 g by mouth 2 times daily (with meals) 3 capsules       Probiotic Product (PROBIOTIC ADVANCED) CAPS Take by mouth daily       vitamin B complex with vitamin C (VITAMIN  B COMPLEX) TABS tablet Take 1 tablet by mouth daily       Allergies  Allergies   Allergen Reactions     Cat Hair Extract      Runny Nose     Pollen Extract      Runny Nose     Family History  Family History   Problem Relation Age of Onset     Thyroid Disease Sister      Pancreatic Cancer No family hx of      Nephrolithiasis No family hx of      Brain Tumor No family hx of      Diabetes No family hx of      Social History  Social History     Tobacco Use     Smoking status: Never Smoker     Smokeless tobacco: Current User     Types: Chew     Tobacco comment: 1/2 can daily   Substance Use Topics     Alcohol use: Yes     Comment: 6-7 beers every 2-3 weeks     Drug use: No      . Drinks < daily (he is vague on quantity); can't remember when last PCP.      Physical Exam  There were no vitals taken for this visit.  There is no height or weight on file to calculate BMI.  GENERAL: pleasant man in no distress  SKIN: Visible skin clear. No significant rash, abnormal pigmentation or lesions.  EYES: Eyes grossly normal to inspection.  No discharge or erythema, or obvious scleral/conjunctival  abnormalities.  RESP: No audible wheeze, cough, or visible cyanosis.  No visible retractions or increased work of breathing.    NEURO: Awake, alert, responds appropriately to questions.  Mentation and speech fluent.  PSYCH:affect normal;  and appearance well-groomed.    DATA REVIEw    MRCP Without and With ContrastCLINICAL HISTORY: Insulinoma; insulinoma restaging.Per EPIC: [id number] pancreatic head insulinoma. ?No evidence of  metastatic disease. On 12/08/2016 performed a Whipple procedure.DATE: 11/19/2021 10:12 AM   TECHNIQUE:    Images were acquired with and without intravenous gadolinium contrastthrough the upper abdomen. The following MR images were acquired  without intravenous contrast: TrueFISP, multiplanar T2-weighted, axialT1 in/out of phase, T2-weighted MRCP images, axial diffusion-weighted  and axial apparent diffusion coefficient. T1-weighted images wereobtained before contrast at the multiple time points following  contrast injection. 3-D reformatted images were generated by the technologist. Contrast dose: 10 mL Gadavist  Comparison study: Abdomen MRI from 11/20/2020.  FINDINGS:  Postsurgical changes of Whipple procedure, including choledochojejunostomy. Unchanged prominent main pancreatic duct,measuring up to 6 mm. No evidence suggest recurrent tumor.  Liver: Liver is enlarged, measuring 20 cm craniocaudally. No suspicious focal lesion. No hepatic steatosis iron deposition.  Gallbladder: Surgically absent.  Spleen: Unremarkable.  Kidneys: No hydronephrosis or focal mass. Small left renal cyst.  Adrenal glands: Unremarkable.   Bowel: No dilated bowel loop.  Lymph nodes: Decrease in size of the portacaval enlarged lymph node,measuring 1.2 cm, previously 1.7 cm (series 25 image 45).   Blood vessels: Unremarkable.  Lung bases: Clear.  Bones and soft tissues: No suspicious lesion.  Mesentery and abdominal wall: Anterior abdominal incision scar.Unchanged small midline abdominal wall Oliva hernia containing  a  loop of nondilated noninflamed small bowel.  Ascites: None.                                                             IMPRESSION:This patient with history of insulinoma status post resection:  1. Postsurgical changes of Whipple procedure without evidence forrecurrent tumor or metastatic disease.  2. Unchanged small midline abdominal wall Oliva hernia containing aloop of nondilated noninflamed small bowel.  3. Decrease in size of the portacaval enlarged lymph node.  I have personally reviewed the examination and initial interpretationand I agree with the findings.  HORTENSIA HUERTAS MD     Results for YESENIA TORRES (MRN [id number]) as of 11/23/2021 15:00   Ref. Range 11/19/2021 08:45   Sodium Latest Ref Range: 133 - 144 mmol/L 138   Potassium Latest Ref Range: 3.4 - 5.3 mmol/L 4.2   Chloride Latest Ref Range: 94 - 109 mmol/L 103   Carbon Dioxide Latest Ref Range: 20 - 32 mmol/L 28   Urea Nitrogen Latest Ref Range: 7 - 30 mg/dL 27   Creatinine Latest Ref Range: 0.66 - 1.25 mg/dL 1.01   GFR Estimate Latest Ref Range: >60 mL/min/1.73m2 88   Calcium Latest Ref Range: 8.5 - 10.1 mg/dL 9.3   Anion Gap Latest Ref Range: 3 - 14 mmol/L 7   Albumin Latest Ref Range: 3.4 - 5.0 g/dL 3.9   Protein Total Latest Ref Range: 6.8 - 8.8 g/dL 7.0   Bilirubin Total Latest Ref Range: 0.2 - 1.3 mg/dL 1.0   Alkaline Phosphatase Latest Ref Range: 40 - 150 U/L 27 (L)   ALT Latest Ref Range: 0 - 70 U/L 78 (H)   AST Latest Ref Range: 0 - 45 U/L 53 (H)   C-Peptide Latest Ref Range: 0.9 - 6.9 ng/mL 0.6 (L)   Chromogranin A Latest Ref Range: 0 - 103 ng/mL 57   Hemoglobin A1C Latest Ref Range: 0.0 - 5.6 % 5.6       Again, thank you for allowing me to participate in the care of your patient.      Sincerely,    Eva Ortiz MD

## 2021-11-23 NOTE — PATIENT INSTRUCTIONS
See me in 12-18 months, on the off time from when  You see Dr Hanley    Let me know if you start having low blood sugar spells.    I will let you know the pro insulin level when it comes in     I recommend you hold alcohol for a while and see your primary care doctor to follow up on the enlarged liver.

## 2021-11-23 NOTE — PROGRESS NOTES
Joey is a 46 year old who is being evaluated via a billable video visit.      How would you like to obtain your AVS? "[employer]hart  If the video visit is dropped, the invitation should be resent by: Text to cell phone: [phone]  Will anyone else be joining your video visit?   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 Tumor was 5.1 cm, not pathologically  malignant (as defined by distant mets, vascular or capsular invasion).  This does not fully exclude malignancy.   pT2, pNo, pMx, stage IB assuming no distant mets.   There were no elevated  markers measured prior to surgery , which will help us detect recurrence, other than the hypoglycemia/ hyperinsulinemia The primary tumor uptake of octreotide was not zero but it appeared quite low on the octreoscan. The proinsulin seemed to be the same with fasting and non-fasting.  He no longer has symptoms suggestive of hypoglycemia.  His weight is back down , which goes against insulinoma.   I have noted that think that the proinsulin might be a useful tumor marker for him.  We will follow this (always in the context of glucose and c peptide)     S/p Whipple procedure -   on treatment of pancreatic exocrine replacement. This may be a risk for future diabetes development, depending on the volume of pancreatic tissue that was actually removed.      Pre-diabetes in the past.  Recent A1c normal.     Hepatomegaly - and increased LFTS .  Discussed. I have recommended he see his PCP to follow up on this. I suggeseted he hold ETOH for a while to see if this improves the LFT aabnormality.     Due to the COVID 19 pandemic this visit was a video visit. The patient gave verbal consent for the visit today.    I have independently reviewed and interpreted labs, imaging as indicated.     Chart review/prep time 1  [id number]  Visit Start time amwel 1459   Visit Stop  time 1511  _30_ minutes spent on the date of the encounter doing chart review, history and exam, documentation and further activities as noted above.    Hilda Ortiz MD    Cc/HISTORY OF PRESENT ILLNESS  46- year old man presentsfor follow up of history of insulinoma. I last saw him 1/2020. He continues to follow with Dr Hanley in Oncology and he tells me he has upcoming appt with him as well.  They have continued with frequent imaging.  Since our last visit he has had MRI abdomen x 2, most recently 11/2021.  Both showed hepatomegaly.  Joey was not aware of this today.      See my 5/10/17 note for description of the presentation and diagnostic tests   His treatment course has been as follows:  11/18/16 FNA -EUS pancreatic head mass was positive for malignancy - neuroendocrine tumor.    12/8/16  open nonpylorus preserving Whipple procedure with cholecystectomy and feeding jejunostomy removing  pancreatic neuroendocrine tumor (5.1 cm) , intermediate grade (G2).  0/10 LN negative.  KI -67 in some areas of tumor up to 5% ( the synoptic states there were 10 mitoses /10 HPF, no LV invasion.  No IHC was performed .    Weights  11/18/16 283  9/12/17: 230  1/19/18: 247  1/28/19: 268  1/6/2020: 266  Weight reported today 235    We have been following neuroendocrine tumor markers as follows  6/5/17 :CGA 53, glucose 90  9/12/17: proinsulin < 1.6, C peptide 0.5, HgbA1c 5.1, CGA 62, glucose 88  1/16/18: proinsulin 2.1, C peptide 1.0, CGA 77  5/10/18: CGA 53, glucose 104  10/16/18: CGA 55, glucose 100  1/28/19: proinsulin 3.1 pM (< 8.0), C peptide 1.1, glucose 96,  HgbA1c 6.2% - followed appt,   10/17/19: CGA 63  11/19/2021 fasting   HgbA1c 5.6, CGA 57, C peptide 0.6, glucose 98, creatinine 1.01, ALT 78, AST 53, alk phos 27, bili 1.0   proinsulin pending    We have the following imaging studies:   10/31/16: MRI abdomen pancreas ([facility]): 3.9 cm mass pancreas with central necrosis. Mass encases 50% of adjacent SMV which  remains patent. - Liver unremarkable.   11/11/16 CT abdomen: enhancing lesion with cystic component or central necrosis within head and uncinate process of pancreas concerning for malignancy.  1 cm indeterminate portacaval LN.   12/1/16 octreoscan: read as negative; I think the study shows very faint activity at the pancreatic mass site.  6/5/17 MRI abdomen: ? Liver mass  4/18/19and 10/17/19 MRI abdomen: KIKO  11/20/2020 MRI Abdomen: hepatomegaly 23.5 cm   11/19/2021 MRI abdomen: hepatomegaly 20 cm    REVIEW OF SYSTEMS  Feel normal  No spells 0-- ? One spell once 304 months ago  Hadn't eaten for a long time. He felt drained.  He had no other symptoms.    He ate later.    Weight is currently 235 -- attributed to tring to lose weihg in the summer.    Cardiac:   GI: some diarrhea and constipatoin .    No headaches;   No sweating   Sleep at night is OK      Past Medical History  Past Medical History:   Diagnosis Date     Hypoglycemia     due to insulinoma     Migraine      Pancreatic neuroendocrine tumor- insulinoma 2016     Ruptured appendix 2003     Tinea versicolor      Uncomplicated asthma         Past Surgical History:   Procedure Laterality Date     APPENDECTOMY       ENDOSCOPIC ULTRASOUND UPPER GASTROINTESTINAL TRACT (GI) N/A 11/18/2016    Procedure: ENDOSCOPIC ULTRASOUND, ESOPHAGOSCOPY / UPPER GASTROINTESTINAL TRACT (GI);  Surgeon: Guru Malathi Canas MD;  Location: [facility] OR     PICC INSERTION       WHIPPLE PROCEDURE N/A 12/8/2016    Procedure: WHIPPLE PROCEDURE;  Surgeon: Matthias Armenta MD;  Location: [facility] OR       Medications    Current Outpatient Medications   Medication Sig Dispense Refill     albuterol (PROAIR HFA, PROVENTIL HFA, VENTOLIN HFA) 108 (90 BASE) MCG/ACT inhaler Inhale 1-2 puffs into the lungs every 6 hours as needed for shortness of breath / dyspnea or wheezing       amylase-lipase-protease (CREON 6) 6000 units CPEP Take 1-2 capsules (6,000-12,000 Units) by mouth 3 times daily  (with meals) 450 capsule 3     Ascorbic Acid (VITAMIN C PO) Take by mouth 2 times daily        ASPIRIN PO Take 81 mg by mouth       Cholecalciferol (VITAMIN D-3 PO) Take by mouth daily Pt unsure of dosage       fluticasone (FLOVENT HFA) 110 MCG/ACT inhaler Inhale 2 puffs into the lungs At Bedtime        Multiple Vitamin (MULTI-VITAMINS) TABS        Omega-3 Fatty Acids (OMEGA-3 FISH OIL PO) Take 1 g by mouth 2 times daily (with meals) 3 capsules       Probiotic Product (PROBIOTIC ADVANCED) CAPS Take by mouth daily       vitamin B complex with vitamin C (VITAMIN  B COMPLEX) TABS tablet Take 1 tablet by mouth daily       Allergies  Allergies   Allergen Reactions     Cat Hair Extract      Runny Nose     Pollen Extract      Runny Nose     Family History  Family History   Problem Relation Age of Onset     Thyroid Disease Sister      Pancreatic Cancer No family hx of      Nephrolithiasis No family hx of      Brain Tumor No family hx of      Diabetes No family hx of      Social History  Social History     Tobacco Use     Smoking status: Never Smoker     Smokeless tobacco: Current User     Types: Chew     Tobacco comment: 1/2 can daily   Substance Use Topics     Alcohol use: Yes     Comment: 6-7 beers every 2-3 weeks     Drug use: No      . Drinks < daily (he is vague on quantity); can't remember when last PCP.      Physical Exam  There were no vitals taken for this visit.  There is no height or weight on file to calculate BMI.  GENERAL: pleasant man in no distress  SKIN: Visible skin clear. No significant rash, abnormal pigmentation or lesions.  EYES: Eyes grossly normal to inspection.  No discharge or erythema, or obvious scleral/conjunctival abnormalities.  RESP: No audible wheeze, cough, or visible cyanosis.  No visible retractions or increased work of breathing.    NEURO: Awake, alert, responds appropriately to questions.  Mentation and speech fluent.  PSYCH:affect normal;  and appearance well-groomed.    DATA  REVIEw    MRCP Without and With ContrastCLINICAL HISTORY: Insulinoma; insulinoma restaging.Per EPIC: [id number] pancreatic head insulinoma. ?No evidence of  metastatic disease. On 12/08/2016 performed a Whipple procedure.DATE: 11/19/2021 10:12 AM   TECHNIQUE:    Images were acquired with and without intravenous gadolinium contrastthrough the upper abdomen. The following MR images were acquired  without intravenous contrast: TrueFISP, multiplanar T2-weighted, axialT1 in/out of phase, T2-weighted MRCP images, axial diffusion-weighted  and axial apparent diffusion coefficient. T1-weighted images wereobtained before contrast at the multiple time points following  contrast injection. 3-D reformatted images were generated by the technologist. Contrast dose: 10 mL Gadavist  Comparison study: Abdomen MRI from 11/20/2020.  FINDINGS:  Postsurgical changes of Whipple procedure, including choledochojejunostomy. Unchanged prominent main pancreatic duct,measuring up to 6 mm. No evidence suggest recurrent tumor.  Liver: Liver is enlarged, measuring 20 cm craniocaudally. No suspicious focal lesion. No hepatic steatosis iron deposition.  Gallbladder: Surgically absent.  Spleen: Unremarkable.  Kidneys: No hydronephrosis or focal mass. Small left renal cyst.  Adrenal glands: Unremarkable.   Bowel: No dilated bowel loop.  Lymph nodes: Decrease in size of the portacaval enlarged lymph node,measuring 1.2 cm, previously 1.7 cm (series 25 image 45).   Blood vessels: Unremarkable.  Lung bases: Clear.  Bones and soft tissues: No suspicious lesion.  Mesentery and abdominal wall: Anterior abdominal incision scar.Unchanged small midline abdominal wall Oliva hernia containing a  loop of nondilated noninflamed small bowel.  Ascites: None.                                                             IMPRESSION:This patient with history of insulinoma status post resection:  1. Postsurgical changes of Whipple procedure without evidence forrecurrent  tumor or metastatic disease.  2. Unchanged small midline abdominal wall Oliva hernia containing aloop of nondilated noninflamed small bowel.  3. Decrease in size of the portacaval enlarged lymph node.  I have personally reviewed the examination and initial interpretationand I agree with the findings.  HORTENSIA HUERTAS MD     Results for YESENIA TORRES (MRN [id number]) as of 11/23/2021 15:00   Ref. Range 11/19/2021 08:45   Sodium Latest Ref Range: 133 - 144 mmol/L 138   Potassium Latest Ref Range: 3.4 - 5.3 mmol/L 4.2   Chloride Latest Ref Range: 94 - 109 mmol/L 103   Carbon Dioxide Latest Ref Range: 20 - 32 mmol/L 28   Urea Nitrogen Latest Ref Range: 7 - 30 mg/dL 27   Creatinine Latest Ref Range: 0.66 - 1.25 mg/dL 1.01   GFR Estimate Latest Ref Range: >60 mL/min/1.73m2 88   Calcium Latest Ref Range: 8.5 - 10.1 mg/dL 9.3   Anion Gap Latest Ref Range: 3 - 14 mmol/L 7   Albumin Latest Ref Range: 3.4 - 5.0 g/dL 3.9   Protein Total Latest Ref Range: 6.8 - 8.8 g/dL 7.0   Bilirubin Total Latest Ref Range: 0.2 - 1.3 mg/dL 1.0   Alkaline Phosphatase Latest Ref Range: 40 - 150 U/L 27 (L)   ALT Latest Ref Range: 0 - 70 U/L 78 (H)   AST Latest Ref Range: 0 - 45 U/L 53 (H)   C-Peptide Latest Ref Range: 0.9 - 6.9 ng/mL 0.6 (L)   Chromogranin A Latest Ref Range: 0 - 103 ng/mL 57   Hemoglobin A1C Latest Ref Range: 0.0 - 5.6 % 5.6

## 2021-11-24 LAB — PROINSULIN P 12H FAST SERPL-SCNC: 1.8 PMOL/L

## 2021-11-29 ENCOUNTER — VIRTUAL VISIT (OUTPATIENT)
Dept: ONCOLOGY | Facility: CLINIC | Age: 46
End: 2021-11-29
Attending: INTERNAL MEDICINE
Payer: COMMERCIAL

## 2021-11-29 DIAGNOSIS — D13.7 INSULINOMA: Primary | ICD-10-CM

## 2021-11-29 PROCEDURE — 999N001193 HC VIDEO/TELEPHONE VISIT; NO CHARGE

## 2021-11-29 PROCEDURE — 99213 OFFICE O/P EST LOW 20 MIN: CPT | Mod: GT | Performed by: INTERNAL MEDICINE

## 2021-11-29 NOTE — LETTER
11/29/2021         RE: Joey Guerrero  [address]  [facility] [address]        Dear Colleague,    Thank you for referring your patient, Joey Guerrero, to the [facility]. Please see a copy of my visit note below.    Oncology follow-up Visit:  Nov 29, 2021    Diagnosis: [id number] pancreatic head insulinoma.  No evidence of metastatic disease.     Treatment to date: Whipple resection December 8, 2016, by Dr. Matthias Armenta. Zero of 10 lymph nodes involved, grade 2, negative resection margins, Ki-67 equals 5%.  preoperative serum chromogranin A = 70 on 12/05/2016.     COVID VACCINATION STATUS: confirmed 11/29/21.    REFERRING PHYSICIAN:  Dr. Matthias Armenta, Hepatobiliary Surgery, [facility].      Oncology History:  Over a 2-3 year period, beginning in 2014 or so, he was having hypoglycemic episodes of unexplained origin.  He was even hospitalized for one such episode in the fall of 2016.  He met with a primary care physician, and subsequently an endocrinologist.  There was concern for hypoglycemic cause and concern for potential insulinoma.  On 10/31/2016, MRI at [facility] revealed a 3.9 x 3.7 x 3.6 cm mass in the head of the pancreas.  There was involvement of the superior mesenteric vein, but no evidence of metastatic disease noted.  He met with Dr. Migel Sena at [facility], and a referral was made for surgical evaluation.      On 11/11/2016, a CT abdomen and pelvis was performed revealing an enhancing lesion with a cystic component versus central necrosis in the head and the uncinate process of the pancreas.  There was an indeterminate portacaval lymph node measuring 2.1 cm.  Otherwise, no evidence of suspicious adenopathy in the abdomen.  EUS was performed by Dr. Guru Canas at the [facility] on 11/18/2016.  He performed an FNA.  This revealed the tumor was composed of grade 1 neuroendocrine tumor with a low proliferation marker of Ki-67 less  than 2%.  The tumor was positive for synaptophysin, chromogranin and CD56.      On 12/01/2016, the patient underwent a preoperative octreotide scan that did not show any evidence of metastatic disease or tumor.  There was no radiotracer uptake.  A baseline chromogranin A was 70.      His C peptide at baseline was 0.7.  Insulin level at the preoperative baseline was 2.2 and 3.5.  Proinsulin levels were less than 7.5, and a subsequent level was 9.6.     On 12/05/2016, the patient met with Dr. Matthias Armenta, who took the patient to the operating room on 12/08/2016 to perform a Whipple.  The pancreaticoduodenectomy resulted in the revealing of a pancreatic neuroendocrine tumor at the head of the pancreas that measured 5.1 cm with intermediate grade G2.  Margins were negative by 0.1 cm.  Zero of 12 lymph nodes were involved.  The Ki-67 registered at 5% with 4 mitoses/10 high-power fields.  There was no perivascular, nor perineural invasion.  The pathologic stage was T2.  The pathologic N stage was N0, and M0 per the prior octreotide scan.  The patient is currently 9 weeks out from surgery.    2/6/17 - - medical oncology consultation, Dr. Hanley.  6/5/17 - - MRI abdomen: IMPRESSION:   1. Postsurgical of Whipple procedure evidence of locally recurrent  neuroendocrine tumor.  2. 1.2 cm ill-defined hepatic lesion in the central liver. This is  indeterminate and could represent sequela of recent episode of  inflammation or infection. However, it is new from prior and  metastases are not excluded. Consider short-term follow-up MRI.  3. Mildly increased size of pericaval lymph node and unchanged mildly  enlarged randy hepatis lymph node, indeterminate but favored to be  reactive.  6/9/17 - - oncology follow-up, Dr. Hanley.  9/12/17 - - endocrine consultation, Dr. Ortiz. Assessment of insulinoma: recommendation to follow Pro-insulin is a potential useful tumor marker for recurrence, to be followed by endocrinology, in context of  glucose and C-peptide levels.  9/12/17 - - MRI abdomen: IMPRESSION: In this patient with history of pancreatic head  insulinoma, post Whipple resection there is no evidence for metastatic  disease within the abdomen. Previously described lesion in the central  liver near the IVC is not well characterized in the present study.  9/15/17 - - oncology follow-up, Dr. Hanley.  1/16/18 - - MRI abdomen: IMPRESSION:   In this patient with history of insulinoma in the pancreatic head  status post Whipple procedure:  1. No evidence of metastatic disease within the abdomen.  2. Mild pancreatic duct ectasia measuring up to 6 mm. This is  unchanged to minimally increased when compared to the prior exam and  of uncertain clinical significance. No significant associated T1  abnormality to suggest pancreatitis.  1/19/18 - - oncology follow-up, Dr. Hanley.  5/10/18 - - MRI abdomen IMPRESSION: In this patient with history of neuroendocrine tumor of  the pancreas and subsequent Whipple procedure:  1. Slightly increased size of a precaval lymph node, currently  measuring 1.5 x 2.0 cm comparison measured 1.3 x 1.6 cm on 1/16/2018  exam. This is indeterminate  2. Mild persistent dilatation of the main pancreatic duct measuring 6mm.    5/14/18 - - oncology follow-up, Dr. Hanley.  6/12/18 - - patient was no-show for endocrinology visit with Dr. Ortiz.  10/18/18 - - MRI abdomen: IMPRESSION:   1. In this patient with history of pancreatic insulinoma status post  Whipple procedure, there is no evidence of locally recurrent or  metastatic disease.  2. Stable prominent portacaval lymph node.  10/22/18 - - oncology follow-up, Dr. Hanley.  4/18/19 - - MRI abdomen: IMPRESSION: Postoperative changes from Whipple procedure for  insulinoma without evidence of recurrent disease in the abdomen.  4/22/19 - - oncology follow-up, Dr. Hanley.    10/17/19--MRI abdomen IMPRESSION:   1-Stable Postoperative changes from Whipple procedure for insulinoma  without evidence  of recurrent disease in the abdomen.   2-Stable Oliva hernia containing nondilated short segment of small  bowel.   10/28/19--oncology follow-up, Dr. Hanley.    4/24/20--MRI abdomen - IMPRESSION:  1. Postsurgical changes of Whipple procedure with resection of  insulinoma. No evidence of recurrent disease.  2. Unchanged small right paramedian Oliva hernia containing a loop  of nondilated noninflamed small bowel.    5/1/20--oncology follow-up, Dr. Hanley.      11/20/20 MRI abdomen IMPRESSION:  1. Postsurgical changes of Whipple procedure with resection of  insulinoma. No evidence of residual/recurrent disease. No definite  metastasis in the abdomen and pelvis.  2. Unchanged small midline abdominal wall Oliva hernia containing a  loop of nondilated noninflamed small bowel.  3. Unchanged prominent portocaval lymph node, measuring 1.7 cm.  4. Hepatomegaly.     11/20/20 Chromogranin 60 ng/ml    11/30/2020 - follow up appointment with Dr. Hanley     11/19/21--MRI abdomen - IMPRESSION:  This patient with history of insulinoma status post resection:  1. Postsurgical changes of Whipple procedure without evidence for  recurrent tumor or metastatic disease.  2. Unchanged small midline abdominal wall Oliva hernia containing a  loop of nondilated noninflamed small bowel.  3. Decrease in size of the portacaval enlarged lymph node.    November 29, 2021 -- oncology follow-up/virtual visit, Dr. Hanley.        Interim History/History Of Present Illness:  Mr. Joey Guerrero is a 46 year old  man with pancreatic head insulinoma resected by Dr. Armenta on December 8, 2016.    He joins me from home along with his wife.  He is generally doing well.  He works as a  in the community.  He confirms he is vaccinated for COVID vaccination protection against the pandemic related infection.  Their children, ages 9 and 13, are back in school and they are very happy about that.  Overall, he presented with significant symptoms including  tingling and other symptoms of hypoglycemia when he was first diagnosed with insulinoma over 5 years ago.  Coming up next week will joyce 5 years since his Whipple resection of the pancreatic insulinoma by my surgical colleague, Dr. Armenta.  He overall is feeling well.  He confirms he has not had any symptoms resembling the initial set of symptoms since his surgery 5 years ago.      Over the summer, he had some mild tingling but without any dizziness or any other presyncopal or syncopal symptoms.  He does not know whether his blood sugar was particular low on that date.  Otherwise, he has been rather unremarkable and able to work without difficulty.  He met with Dr. Ortiz mid last week and had endocrine followup.  He had an MRI abdomen 11/19/2021 that showed no evidence of recurrent malignancy.  He stated that Dr. Ortiz has some concern about enlarged liver that is 20 cm in craniocaudal dimension.  I used the opportunity to ask about alcohol intake.  He states initially that he does not take alcohol regularly but drinks 12 beers on the weekends of unspecified duration.            Results for YESENIA TORRES (MRN [id number]) as of 11/29/2021 09:07   Ref. Range 10/18/2018 12:36 4/18/2019 13:20 10/17/2019 11:35 4/24/2020 12:14 11/19/2021 08:45   Chromogranin A Latest Ref Range: 0 - 103 ng/mL 55 52 63 67 57     Review Of Systems:  Full 14-point ROS reviewed. Pertinent symptoms reviewed above per HPI.    Past medical, social, surgical, and family histories reviewed.     PAST MEDICAL HISTORY:  Pancreatic neuroendocrine tumor, pathologic T2 N0 M0 per above, status post Whipple surgery on 12/08/2016.  Other surgeries notable include an appendectomy in 2007.      FAMILY HISTORY:  Denies any family history of malignancy.      SOCIAL HISTORY:  The patient lives in [place], [place], with his wife.  They have 2 boys, ages 9 and 13 as of Nov 2021.   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    Tobacco:   Used to chew and smoke occasional cigarettes since the age of 18 until recently.    Alcohol:  As of Nov 2021: 6-12 beers per weekend, unspecified number of years.   Drugs:  Denies illicit drug use.     Allergies:  Allergies as of 11/29/2021 - Reviewed 11/30/2020   Allergen Reaction Noted     Cat hair extract  12/05/2016     Pollen extract  12/05/2016       Current Medications:  Current Outpatient Medications   Medication Sig Dispense Refill     albuterol (PROAIR HFA, PROVENTIL HFA, VENTOLIN HFA) 108 (90 BASE) MCG/ACT inhaler Inhale 1-2 puffs into the lungs every 6 hours as needed for shortness of breath / dyspnea or wheezing       amylase-lipase-protease (CREON 6) 6000 units CPEP Take 1-2 capsules (6,000-12,000 Units) by mouth 3 times daily (with meals) 450 capsule 3     Ascorbic Acid (VITAMIN C PO) Take by mouth 2 times daily        ASPIRIN PO Take 81 mg by mouth       Cholecalciferol (VITAMIN D-3 PO) Take by mouth daily Pt unsure of dosage       fluticasone (FLOVENT HFA) 110 MCG/ACT inhaler Inhale 2 puffs into the lungs At Bedtime        Multiple Vitamin (MULTI-VITAMINS) TABS        Omega-3 Fatty Acids (OMEGA-3 FISH OIL PO) Take 1 g by mouth 2 times daily (with meals) 3 capsules       Probiotic Product (PROBIOTIC ADVANCED) CAPS Take by mouth daily       vitamin B complex with vitamin C (VITAMIN  B COMPLEX) TABS tablet Take 1 tablet by mouth daily          Physical Exam:  Physical exam could not be performed today in context of a Virtual Visit during the COVID19/Coronavirus pandemic.  Vitals - Patient Reported  Weight (Patient Reported): 106.6 kg (235 lb)  Pain Score: No Pain (0)         Observed physical assessments made today by visualizing the patient by video link:    General/Constitutional: Generally appears well, not acutely ill.  HEENT: no scleral icterus, not jaundiced.  Respiratory: no labored breathing.  Musculoskeletal: appears to have full range of motion and adequate physical strength.  Skin: no  jaundice, discoloration or other notable lesions.  Neurological: no evidence of tremors.  Psychiatric: no evident anxiety; fully alert and oriented with fluent speech.      The rest of a comprehensive physical examination is deferred due to PHE (public health emergency) video visit restrictions.    Laboratory/Imaging Studies.  Prior to and including the day of this visit, I personally reviewed the recent imaging scans. I released the pertinent recent imaging results to [employer] in advance of this visit, and reviewed the summary verbatim and in lay language during today's call.  Results for YESENIA TORRES (MRN [id number]) as of 11/29/2021 09:07   Ref. Range 10/18/2018 12:36 4/18/2019 13:20 10/17/2019 11:35 4/24/2020 12:14 11/19/2021 08:45   Chromogranin A Latest Ref Range: 0 - 103 ng/mL 55 52 63 67 57       ASSESSMENT/PLAN:  46 year old man with pancreatic head insulinoma, who presented with hypoglycemic episodes for several years prior to diagnosis. He had Whipple surgery by Dr. Matthias Armenta on 12/08/2016 ([id number]).     I congratulated him on being 5 years out from resection of his insulinoma and without recurrence of malignancy.  He is generally doing well.  He is not having any symptoms that preceded the diagnosis of insulinoma that related to high insulin and hypoglycemic levels.  I reviewed with him and the recommendation of continued active surveillance.  We can obtain another MRI abdomen and chromogranin A in 1 year.  Ultimately, if he felt strongly about not pursuing continued surveillance, he can notify us should he develop any concerning symptoms.  He follows my recommendation for MRI abdomen in 1 year.  We look forward to seeing him at that time.      We will get a chromogranin A drawn on the same day.  He will follow up with Dr. Ortiz from the  Endocrinology team as he has been doing and as is indicated.  He met with her last week and he states that she expressed a concern about the size of his liver.  I  reviewed a pattern of potential alcohol abuse.  He binge drinks on the weekends, 6-12 drinks of beer per weekend or per day for weekend for an unspecified number of years.  I stated that alcohol is a risk factor for many types of cancers including potentially hepatocellular carcinoma, cholangiocarcinoma, colorectal cancer and upper GI cancers, among others.  I have recommended caution against excessive use of alcohol and suggestion of better moderation.  He stated full understanding.  I used the opportunity to also suggest he reconnect with his primary care physician for primary care, appropriate screening and testing, including prostate cancer screening and colorectal cancer screening as appropriate after discussion with his PCP and also other noncancer screening related issues such as cholesterol and other issues.  He states he will follow up with primary care physician.  I will look forward to seeing him back in 1 year.    I spent 12 minutes in consultation, including history and discussion with the patient including review of recent lab values and radiologic imaging results.  An additional 10 minutes was spent on the day of the visit, including reviewing pertinent medical notes and documentation from other physicians and APPs who have evaluated and treated this patient, pertinent lab values, pathology and imaging results, personal review of radiologic images, discussing the case with referring providers and/or nurse coordinator team, post-visit orders, and all post-visit documentation.    Brandan Hanley MD PhD

## 2022-02-26 ENCOUNTER — HEALTH MAINTENANCE LETTER (OUTPATIENT)
Age: 47
End: 2022-02-26

## 2022-10-30 ENCOUNTER — HEALTH MAINTENANCE LETTER (OUTPATIENT)
Age: 47
End: 2022-10-30

## 2022-11-23 ENCOUNTER — LAB (OUTPATIENT)
Dept: LAB | Facility: CLINIC | Age: 47
End: 2022-11-23
Attending: INTERNAL MEDICINE
Payer: COMMERCIAL

## 2022-11-23 ENCOUNTER — ANCILLARY PROCEDURE (OUTPATIENT)
Dept: MRI IMAGING | Facility: CLINIC | Age: 47
End: 2022-11-23
Attending: INTERNAL MEDICINE
Payer: COMMERCIAL

## 2022-11-23 DIAGNOSIS — D13.7 INSULINOMA: ICD-10-CM

## 2022-11-23 PROCEDURE — A9585 GADOBUTROL INJECTION: HCPCS | Performed by: RADIOLOGY

## 2022-11-23 PROCEDURE — 86316 IMMUNOASSAY TUMOR OTHER: CPT | Mod: 90 | Performed by: PATHOLOGY

## 2022-11-23 PROCEDURE — 74183 MRI ABD W/O CNTR FLWD CNTR: CPT | Mod: GC | Performed by: RADIOLOGY

## 2022-11-23 PROCEDURE — 36415 COLL VENOUS BLD VENIPUNCTURE: CPT | Performed by: PATHOLOGY

## 2022-11-23 PROCEDURE — 99000 SPECIMEN HANDLING OFFICE-LAB: CPT | Performed by: PATHOLOGY

## 2022-11-23 RX ORDER — GADOBUTROL 604.72 MG/ML
15 INJECTION INTRAVENOUS ONCE
Status: COMPLETED | OUTPATIENT
Start: 2022-11-23 | End: 2022-11-23

## 2022-11-23 RX ADMIN — GADOBUTROL 12 ML: 604.72 INJECTION INTRAVENOUS at 10:40

## 2022-11-23 NOTE — DISCHARGE INSTRUCTIONS
MRI Contrast Discharge Instructions    The IV contrast you received today will pass out of your body in your  urine. This will happen in the next 24 hours. You will not feel this process.  Your urine will not change color.    Drink at least 4 extra glasses of water or juice today (unless your doctor  has restricted your fluids). This reduces the stress on your kidneys.  You may take your regular medicines.    If you are on dialysis: It is best to have dialysis today.    If you have a reaction: Most reactions happen right away. If you have  any new symptoms after leaving the hospital (such as hives or swelling),  call your hospital at the correct number below. Or call your family doctor.  If you have breathing distress or wheezing, call 911.    Special instructions: ***    I have read and understand the above information.    Signature:______________________________________ Date:___________    Staff:__________________________________________ Date:___________     Time:__________    [facility] Departments:    ___[place]: [phone]  ___[facility]: [phone]  ___[place]: [phone] ___[facility]: [phone]  ___[facility]: [phone]  ___[facility]: [phone]  ___[phone]  ___[facility]: [phone]  ___[phone]

## 2022-11-26 LAB — CGA SERPL-MCNC: 65 NG/ML

## 2022-11-28 ENCOUNTER — VIRTUAL VISIT (OUTPATIENT)
Dept: ONCOLOGY | Facility: CLINIC | Age: 47
End: 2022-11-28
Attending: INTERNAL MEDICINE
Payer: COMMERCIAL

## 2022-11-28 DIAGNOSIS — C25.4 INSULINOMA, MALIGNANT (H): Primary | ICD-10-CM

## 2022-11-28 PROCEDURE — 99213 OFFICE O/P EST LOW 20 MIN: CPT | Mod: GT | Performed by: INTERNAL MEDICINE

## 2022-11-28 NOTE — LETTER
11/28/2022         RE: Joey Guerrero  [address]  [facility] [address]        Dear Colleague,    Thank you for referring your patient, Joey Guerrero, to the [facility]. Please see a copy of my visit note below.    Joey is a 47 year old who is being evaluated via a billable video visit.      How would you like to obtain your AVS? MyChart  If the video visit is dropped, the invitation should be resent by: Text to cell phone: [phone]  Will anyone else be joining your video visit? No        Video-Visit Details      Type of service:  Video Visit        [facility]        Oncology follow-up Visit:  Nov 28, 2022    Diagnosis: [id number] pancreatic head insulinoma.  No evidence of metastatic disease.     Treatment to date: Whipple resection December 8, 2016, by Dr. Matthias Armenta. Zero of 10 lymph nodes involved, grade 2, negative resection margins, Ki-67 equals 5%.  preoperative serum chromogranin A = 70 on 12/05/2016.     COVID VACCINATION STATUS: confirmed 11/29/21.    REFERRING PHYSICIAN:  Dr. Matthias Armenta, Hepatobiliary Surgery, [facility].      Oncology History:  Over a 2-3 year period, beginning in 2014 or so, he was having hypoglycemic episodes of unexplained origin.  He was even hospitalized for one such episode in the fall of 2016.  He met with a primary care physician, and subsequently an endocrinologist.  There was concern for hypoglycemic cause and concern for potential insulinoma.  On 10/31/2016, MRI at [facility] revealed a 3.9 x 3.7 x 3.6 cm mass in the head of the pancreas.  There was involvement of the superior mesenteric vein, but no evidence of metastatic disease noted.  He met with Dr. Migel Sena at [facility], and a referral was made for surgical evaluation.      On 11/11/2016, a CT abdomen and pelvis was performed revealing an enhancing lesion with a cystic component versus central necrosis in the head and the uncinate process of the  pancreas.  There was an indeterminate portacaval lymph node measuring 2.1 cm.  Otherwise, no evidence of suspicious adenopathy in the abdomen.  EUS was performed by Dr. Guru Canas at the [facility] on 11/18/2016.  He performed an FNA.  This revealed the tumor was composed of grade 1 neuroendocrine tumor with a low proliferation marker of Ki-67 less than 2%.  The tumor was positive for synaptophysin, chromogranin and CD56.      On 12/01/2016, the patient underwent a preoperative octreotide scan that did not show any evidence of metastatic disease or tumor.  There was no radiotracer uptake.  A baseline chromogranin A was 70.      His C peptide at baseline was 0.7.  Insulin level at the preoperative baseline was 2.2 and 3.5.  Proinsulin levels were less than 7.5, and a subsequent level was 9.6.     On 12/05/2016, the patient met with Dr. Matthias Armenta, who took the patient to the operating room on 12/08/2016 to perform a Whipple.  The pancreaticoduodenectomy resulted in the revealing of a pancreatic neuroendocrine tumor at the head of the pancreas that measured 5.1 cm with intermediate grade G2.  Margins were negative by 0.1 cm.  Zero of 12 lymph nodes were involved.  The Ki-67 registered at 5% with 4 mitoses/10 high-power fields.  There was no perivascular, nor perineural invasion.  The pathologic stage was T2.  The pathologic N stage was N0, and M0 per the prior octreotide scan.  The patient is currently 9 weeks out from surgery.    2/6/17 - - medical oncology consultation, Dr. Hanley.  6/5/17 - - MRI abdomen: IMPRESSION:   1. Postsurgical of Whipple procedure evidence of locally recurrent  neuroendocrine tumor.  2. 1.2 cm ill-defined hepatic lesion in the central liver. This is  indeterminate and could represent sequela of recent episode of  inflammation or infection. However, it is new from prior and  metastases are not excluded. Consider short-term follow-up MRI.  3. Mildly increased size of  pericaval lymph node and unchanged mildly  enlarged randy hepatis lymph node, indeterminate but favored to be  reactive.  6/9/17 - - oncology follow-up, Dr. Hanley.  9/12/17 - - endocrine consultation, Dr. Ortiz. Assessment of insulinoma: recommendation to follow Pro-insulin is a potential useful tumor marker for recurrence, to be followed by endocrinology, in context of glucose and C-peptide levels.  9/12/17 - - MRI abdomen: IMPRESSION: In this patient with history of pancreatic head  insulinoma, post Whipple resection there is no evidence for metastatic  disease within the abdomen. Previously described lesion in the central  liver near the IVC is not well characterized in the present study.  9/15/17 - - oncology follow-up, Dr. Hanley.  1/16/18 - - MRI abdomen: IMPRESSION:   In this patient with history of insulinoma in the pancreatic head  status post Whipple procedure:  1. No evidence of metastatic disease within the abdomen.  2. Mild pancreatic duct ectasia measuring up to 6 mm. This is  unchanged to minimally increased when compared to the prior exam and  of uncertain clinical significance. No significant associated T1  abnormality to suggest pancreatitis.  1/19/18 - - oncology follow-up, Dr. Hanley.  5/10/18 - - MRI abdomen IMPRESSION: In this patient with history of neuroendocrine tumor of  the pancreas and subsequent Whipple procedure:  1. Slightly increased size of a precaval lymph node, currently  measuring 1.5 x 2.0 cm comparison measured 1.3 x 1.6 cm on 1/16/2018  exam. This is indeterminate  2. Mild persistent dilatation of the main pancreatic duct measuring 6mm.    5/14/18 - - oncology follow-up, Dr. Hanley.  6/12/18 - - patient was no-show for endocrinology visit with Dr. Ortiz.  10/18/18 - - MRI abdomen: IMPRESSION:   1. In this patient with history of pancreatic insulinoma status post  Whipple procedure, there is no evidence of locally recurrent or  metastatic disease.  2. Stable prominent portacaval  lymph node.  10/22/18 - - oncology follow-up, Dr. Hnaley.  4/18/19 - - MRI abdomen: IMPRESSION: Postoperative changes from Whipple procedure for  insulinoma without evidence of recurrent disease in the abdomen.  4/22/19 - - oncology follow-up, Dr. Hanley.    10/17/19--MRI abdomen IMPRESSION:   1-Stable Postoperative changes from Whipple procedure for insulinoma  without evidence of recurrent disease in the abdomen.   2-Stable Oliva hernia containing nondilated short segment of small  bowel.   10/28/19--oncology follow-up, Dr. Hanley.    4/24/20--MRI abdomen - IMPRESSION:  1. Postsurgical changes of Whipple procedure with resection of  insulinoma. No evidence of recurrent disease.  2. Unchanged small right paramedian Oliva hernia containing a loop  of nondilated noninflamed small bowel.    5/1/20--oncology follow-up, Dr. Hanley.      11/20/20 MRI abdomen IMPRESSION:  1. Postsurgical changes of Whipple procedure with resection of  insulinoma. No evidence of residual/recurrent disease. No definite  metastasis in the abdomen and pelvis.  2. Unchanged small midline abdominal wall Oliva hernia containing a  loop of nondilated noninflamed small bowel.  3. Unchanged prominent portocaval lymph node, measuring 1.7 cm.  4. Hepatomegaly.     11/20/20 Chromogranin 60 ng/ml    11/30/2020 - follow up appointment with Dr. Hanley     11/19/21--MRI abdomen - IMPRESSION:  This patient with history of insulinoma status post resection:  1. Postsurgical changes of Whipple procedure without evidence for  recurrent tumor or metastatic disease.  2. Unchanged small midline abdominal wall Oliva hernia containing a  loop of nondilated noninflamed small bowel.  3. Decrease in size of the portacaval enlarged lymph node.    November 29, 2021 -- oncology follow-up/virtual visit, Dr. Hanley.    11/23/22--MRI abdomen--IMPRESSION:  1. Stable mildly enlarged aortocaval lymph node dating back several  years.   2. No other abnormality noted. No evidence of tumor  recurrence status  post Whipple procedure.    November 28, 2022 -- oncology follow-up/virtual visit, Dr. Hanley.      Interim History/History Of Present Illness:  Mr. Joey Guerrero is a 47 year old  man with pancreatic head insulinoma resected by Dr. Armenta on December 8, 2016.     Dictation on: 11/28/2022  9:27 AM by: GEOVANNA HANLEY [[email or web address]]          Latest Reference Range & Units 04/18/19 13:20 10/17/19 11:35 04/24/20 12:14 11/19/21 08:45 11/23/22 09:30   Chromogranin A 0 - 103 ng/mL 52 63 67 57 65       Review Of Systems:  Full 14-point ROS reviewed. Pertinent symptoms reviewed above per HPI.    Past medical, social, surgical, and family histories reviewed.     PAST MEDICAL HISTORY:  Pancreatic neuroendocrine tumor, pathologic T2 N0 M0 per above, status post Whipple surgery on 12/08/2016.  Other surgeries notable include an appendectomy in 2007.      FAMILY HISTORY:  Denies any family history of malignancy.      SOCIAL HISTORY:  Mr. Guerrero lives in [place], [place], with his wife.  They have 2 boys, ages 10 and 14 as of November 2022.   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    Tobacco:  Used to chew and smoke occasional cigarettes since the age of 18 until recently.    Alcohol:  As of Nov 2021: 6-12 beers per weekend, unspecified number of years.   Drugs:  Denies illicit drug use.     Allergies:  Allergies as of 11/28/2022 - Reviewed 11/29/2021   Allergen Reaction Noted     Cat hair extract  12/05/2016     Pollen extract  12/05/2016       Current Medications:  Current Outpatient Medications   Medication Sig Dispense Refill     albuterol (PROAIR HFA, PROVENTIL HFA, VENTOLIN HFA) 108 (90 BASE) MCG/ACT inhaler Inhale 1-2 puffs into the lungs every 6 hours as needed for shortness of breath / dyspnea or wheezing       amylase-lipase-protease (CREON 6) 6000 units CPEP Take 1-2 capsules (6,000-12,000 Units) by mouth 3 times daily (with meals) 450 capsule 3     Ascorbic Acid (VITAMIN C PO) Take by mouth  2 times daily        ASPIRIN PO Take 81 mg by mouth       Cholecalciferol (VITAMIN D-3 PO) Take by mouth daily Pt unsure of dosage       fluticasone (FLOVENT HFA) 110 MCG/ACT inhaler Inhale 2 puffs into the lungs At Bedtime        Multiple Vitamin (MULTI-VITAMINS) TABS        Omega-3 Fatty Acids (OMEGA-3 FISH OIL PO) Take 1 g by mouth 2 times daily (with meals) 3 capsules       Probiotic Product (PROBIOTIC ADVANCED) CAPS Take by mouth daily  (Patient not taking: Reported on 11/29/2021)       vitamin B complex with vitamin C (VITAMIN  B COMPLEX) TABS tablet Take 1 tablet by mouth daily          Physical Exam:  In-person physical exam could not be performed today in context of a Virtual Visit. Observed physical assessments made today by visualizing the patient by video link:  Vitals - Patient Reported  Pain Score: No Pain (0)             General/Constitutional: Generally appears well, not acutely ill.  HEENT: no scleral icterus, not jaundiced.  Respiratory: no labored breathing.  Musculoskeletal: appears to have full range of motion and adequate physical strength.  Skin: no jaundice, discoloration or other notable lesions.  Neurological: no evidence of tremors.  Psychiatric: no evident anxiety; fully alert and oriented with fluent speech.      The rest of a comprehensive physical examination is deferred due to nature of video visits.    Laboratory/Imaging Studies.  Prior to and including the day of this visit, I personally reviewed the recent imaging scans. I released the pertinent recent imaging results to [employer] in advance of this visit, and reviewed the summary verbatim and in lay language during today's call.  Results for YESENIA TORRES (MRN [id number]) as of 11/29/2021 09:07   Ref. Range 10/18/2018 12:36 4/18/2019 13:20 10/17/2019 11:35 4/24/2020 12:14 11/19/2021 08:45   Chromogranin A Latest Ref Range: 0 - 103 ng/mL 55 52 63 67 57       ASSESSMENT/PLAN:  47 year old man with pancreatic head insulinoma, who  presented with hypoglycemic episodes for several years prior to diagnosis. He had Whipple surgery by Dr. Matthias Armenta on 12/08/2016 ([id number]).     Dictation on: 11/28/2022  9:28 AM by: GEOVANNA HANLEY [[email or web address]]           VIRTUAL VISIT - DETAILS:    I have reviewed the note as documented above. This accurately captures the substance of my conversation with the patient.    Date of call: November 28, 2022   Start of call: 9:14 am  End of call: 9:23 am    Provider location: [facility] (academic office)  Patient location: Home      Mode of Video Visit: [facility]           I spent 9 minutes in consultation, including history and discussion with the patient including review of recent lab values and radiologic imaging results.  An additional 13 minutes was spent on the day of the visit, including reviewing pertinent medical notes and documentation from other physicians and APPs who have evaluated and treated this patient, pertinent lab values, pathology and imaging results, personal review of radiologic images, discussing the case with referring providers and/or nurse coordinator team, post-visit orders, and all post-visit documentation.          Again, thank you for allowing me to participate in the care of your patient.      Sincerely,    Geovanna Hanley MD

## 2022-11-28 NOTE — NURSING NOTE
Patient states he takes Creon a few times a week. He is needing a prescription refill.     [place] Aguilar

## 2022-11-28 NOTE — PROGRESS NOTES
Joey is a 47 year old who is being evaluated via a billable video visit.      How would you like to obtain your AVS? MyChart  If the video visit is dropped, the invitation should be resent by: Text to cell phone: [phone]  Will anyone else be joining your video visit? No        Video-Visit Details      Type of service:  Video Visit        Italia Aguilar        Oncology follow-up Visit:  Nov 28, 2022    Diagnosis: [id number] pancreatic head insulinoma.  No evidence of metastatic disease.     Treatment to date: Whipple resection December 8, 2016, by Dr. Matthias Armenta. Zero of 10 lymph nodes involved, grade 2, negative resection margins, Ki-67 equals 5%.  preoperative serum chromogranin A = 70 on 12/05/2016.     COVID VACCINATION STATUS: confirmed 11/29/21.    REFERRING PHYSICIAN:  Dr. Matthias Armenta, Hepatobiliary Surgery, [facility].      Oncology History:  Over a 2-3 year period, beginning in 2014 or so, he was having hypoglycemic episodes of unexplained origin.  He was even hospitalized for one such episode in the fall of 2016.  He met with a primary care physician, and subsequently an endocrinologist.  There was concern for hypoglycemic cause and concern for potential insulinoma.  On 10/31/2016, MRI at [facility] revealed a 3.9 x 3.7 x 3.6 cm mass in the head of the pancreas.  There was involvement of the superior mesenteric vein, but no evidence of metastatic disease noted.  He met with Dr. Migel Sena at [facility], and a referral was made for surgical evaluation.      On 11/11/2016, a CT abdomen and pelvis was performed revealing an enhancing lesion with a cystic component versus central necrosis in the head and the uncinate process of the pancreas.  There was an indeterminate portacaval lymph node measuring 2.1 cm.  Otherwise, no evidence of suspicious adenopathy in the abdomen.  EUS was performed by Dr. Guru Canas at the [facility] on 11/18/2016.  He performed an  FNA.  This revealed the tumor was composed of grade 1 neuroendocrine tumor with a low proliferation marker of Ki-67 less than 2%.  The tumor was positive for synaptophysin, chromogranin and CD56.      On 12/01/2016, the patient underwent a preoperative octreotide scan that did not show any evidence of metastatic disease or tumor.  There was no radiotracer uptake.  A baseline chromogranin A was 70.      His C peptide at baseline was 0.7.  Insulin level at the preoperative baseline was 2.2 and 3.5.  Proinsulin levels were less than 7.5, and a subsequent level was 9.6.     On 12/05/2016, the patient met with Dr. Matthias Armenta, who took the patient to the operating room on 12/08/2016 to perform a Whipple.  The pancreaticoduodenectomy resulted in the revealing of a pancreatic neuroendocrine tumor at the head of the pancreas that measured 5.1 cm with intermediate grade G2.  Margins were negative by 0.1 cm.  Zero of 12 lymph nodes were involved.  The Ki-67 registered at 5% with 4 mitoses/10 high-power fields.  There was no perivascular, nor perineural invasion.  The pathologic stage was T2.  The pathologic N stage was N0, and M0 per the prior octreotide scan.  The patient is currently 9 weeks out from surgery.    2/6/17 - - medical oncology consultation, Dr. Hanley.  6/5/17 - - MRI abdomen: IMPRESSION:   1. Postsurgical of Whipple procedure evidence of locally recurrent  neuroendocrine tumor.  2. 1.2 cm ill-defined hepatic lesion in the central liver. This is  indeterminate and could represent sequela of recent episode of  inflammation or infection. However, it is new from prior and  metastases are not excluded. Consider short-term follow-up MRI.  3. Mildly increased size of pericaval lymph node and unchanged mildly  enlarged randy hepatis lymph node, indeterminate but favored to be  reactive.  6/9/17 - - oncology follow-up, Dr. Hanley.  9/12/17 - - endocrine consultation, Dr. Ortiz. Assessment of insulinoma: recommendation  to follow Pro-insulin is a potential useful tumor marker for recurrence, to be followed by endocrinology, in context of glucose and C-peptide levels.  9/12/17 - - MRI abdomen: IMPRESSION: In this patient with history of pancreatic head  insulinoma, post Whipple resection there is no evidence for metastatic  disease within the abdomen. Previously described lesion in the central  liver near the IVC is not well characterized in the present study.  9/15/17 - - oncology follow-up, Dr. Hanley.  1/16/18 - - MRI abdomen: IMPRESSION:   In this patient with history of insulinoma in the pancreatic head  status post Whipple procedure:  1. No evidence of metastatic disease within the abdomen.  2. Mild pancreatic duct ectasia measuring up to 6 mm. This is  unchanged to minimally increased when compared to the prior exam and  of uncertain clinical significance. No significant associated T1  abnormality to suggest pancreatitis.  1/19/18 - - oncology follow-up, Dr. Hanley.  5/10/18 - - MRI abdomen IMPRESSION: In this patient with history of neuroendocrine tumor of  the pancreas and subsequent Whipple procedure:  1. Slightly increased size of a precaval lymph node, currently  measuring 1.5 x 2.0 cm comparison measured 1.3 x 1.6 cm on 1/16/2018  exam. This is indeterminate  2. Mild persistent dilatation of the main pancreatic duct measuring 6mm.    5/14/18 - - oncology follow-up, Dr. Hanley.  6/12/18 - - patient was no-show for endocrinology visit with Dr. Ortiz.  10/18/18 - - MRI abdomen: IMPRESSION:   1. In this patient with history of pancreatic insulinoma status post  Whipple procedure, there is no evidence of locally recurrent or  metastatic disease.  2. Stable prominent portacaval lymph node.  10/22/18 - - oncology follow-up, Dr. Hanley.  4/18/19 - - MRI abdomen: IMPRESSION: Postoperative changes from Whipple procedure for  insulinoma without evidence of recurrent disease in the abdomen.  4/22/19 - - oncology follow-up,   Dayan.    10/17/19--MRI abdomen IMPRESSION:   1-Stable Postoperative changes from Whipple procedure for insulinoma  without evidence of recurrent disease in the abdomen.   2-Stable Oliva hernia containing nondilated short segment of small  bowel.   10/28/19--oncology follow-up, Dr. Hanley.    4/24/20--MRI abdomen - IMPRESSION:  1. Postsurgical changes of Whipple procedure with resection of  insulinoma. No evidence of recurrent disease.  2. Unchanged small right paramedian Oliva hernia containing a loop  of nondilated noninflamed small bowel.    5/1/20--oncology follow-up, Dr. Hanley.      11/20/20 MRI abdomen IMPRESSION:  1. Postsurgical changes of Whipple procedure with resection of  insulinoma. No evidence of residual/recurrent disease. No definite  metastasis in the abdomen and pelvis.  2. Unchanged small midline abdominal wall Oliva hernia containing a  loop of nondilated noninflamed small bowel.  3. Unchanged prominent portocaval lymph node, measuring 1.7 cm.  4. Hepatomegaly.     11/20/20 Chromogranin 60 ng/ml    11/30/2020 - follow up appointment with Dr. Hanley     11/19/21--MRI abdomen - IMPRESSION:  This patient with history of insulinoma status post resection:  1. Postsurgical changes of Whipple procedure without evidence for  recurrent tumor or metastatic disease.  2. Unchanged small midline abdominal wall Oliva hernia containing a  loop of nondilated noninflamed small bowel.  3. Decrease in size of the portacaval enlarged lymph node.    November 29, 2021 -- oncology follow-up/virtual visit, Dr. Hanley.    11/23/22--MRI abdomen--IMPRESSION:  1. Stable mildly enlarged aortocaval lymph node dating back several  years.   2. No other abnormality noted. No evidence of tumor recurrence status  post Whipple procedure.    November 28, 2022 -- oncology follow-up/virtual visit, Dr. Hanley.      Interim History/History Of Present Illness:  Mr. Joey Guerrero is a 47 year old  man with pancreatic head insulinoma  resected by Dr. Armenta on December 8, 2016.    Mr. Guerrero joins me from home along with his wife for an [employer]-based virtual video visit.  He is generally feeling well.  He denies any presyncope, dizziness, vertigo, actual syncopal episodes or any other concerning symptoms since the last time I interacted with him a year ago when he presented with insulinoma.  He was having symptoms representative of hypoglycemia secondary to the insulinoma and adverse effects.      He had surgical resection of his insulinoma tumor by Dr. Armenta 6 years ago as of next week, and since that time, he has had no recurrence of the tumor.  He had an MRI done last week on 11/23/2022 that showed no evidence of recurrent malignancy in terms of insulinoma.      He has had a chromogranin A checked on the same day and there is no evidence of abnormal chromogranin either.  He has scheduled followup with his primary care physician in the coming months.  He has not yet had a colonoscopy in his life, but he is eligible for colonoscopy for colon cancer screening.  He states a year ago he reached out to Dr. Ortiz's team and they were not able to connect him for an appointment, but he and his wife will proactively make an appointment after this one to meet Dr. Ortiz who has been following him from an Endocrinology standpoint.         Latest Reference Range & Units 04/18/19 13:20 10/17/19 11:35 04/24/20 12:14 11/19/21 08:45 11/23/22 09:30   Chromogranin A 0 - 103 ng/mL 52 63 67 57 65       Review Of Systems:  Full 14-point ROS reviewed. Pertinent symptoms reviewed above per HPI.    Past medical, social, surgical, and family histories reviewed.     PAST MEDICAL HISTORY:  Pancreatic neuroendocrine tumor, pathologic T2 N0 M0 per above, status post Whipple surgery on 12/08/2016.  Other surgeries notable include an appendectomy in 2007.      FAMILY HISTORY:  Denies any family history of malignancy.      SOCIAL HISTORY:  Mr. Guerrero lives in [place]  [place], with his wife.  They have 2 boys, ages 10 and 14 as of November 2022.   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    Tobacco:  Used to chew and smoke occasional cigarettes since the age of 18 until recently.    Alcohol:  As of Nov 2021: 6-12 beers per weekend, unspecified number of years.   Drugs:  Denies illicit drug use.     Allergies:  Allergies as of 11/28/2022 - Reviewed 11/29/2021   Allergen Reaction Noted    Cat hair extract  12/05/2016    Pollen extract  12/05/2016       Current Medications:  Current Outpatient Medications   Medication Sig Dispense Refill    albuterol (PROAIR HFA, PROVENTIL HFA, VENTOLIN HFA) 108 (90 BASE) MCG/ACT inhaler Inhale 1-2 puffs into the lungs every 6 hours as needed for shortness of breath / dyspnea or wheezing      amylase-lipase-protease (CREON 6) 6000 units CPEP Take 1-2 capsules (6,000-12,000 Units) by mouth 3 times daily (with meals) 450 capsule 3    Ascorbic Acid (VITAMIN C PO) Take by mouth 2 times daily       ASPIRIN PO Take 81 mg by mouth      Cholecalciferol (VITAMIN D-3 PO) Take by mouth daily Pt unsure of dosage      fluticasone (FLOVENT HFA) 110 MCG/ACT inhaler Inhale 2 puffs into the lungs At Bedtime       Multiple Vitamin (MULTI-VITAMINS) TABS       Omega-3 Fatty Acids (OMEGA-3 FISH OIL PO) Take 1 g by mouth 2 times daily (with meals) 3 capsules      Probiotic Product (PROBIOTIC ADVANCED) CAPS Take by mouth daily  (Patient not taking: Reported on 11/29/2021)      vitamin B complex with vitamin C (VITAMIN  B COMPLEX) TABS tablet Take 1 tablet by mouth daily          Physical Exam:  In-person physical exam could not be performed today in context of a Virtual Visit. Observed physical assessments made today by visualizing the patient by video link:  Vitals - Patient Reported  Pain Score: No Pain (0)             General/Constitutional: Generally appears well, not acutely ill.  HEENT: no scleral icterus, not jaundiced.  Respiratory: no labored  breathing.  Musculoskeletal: appears to have full range of motion and adequate physical strength.  Skin: no jaundice, discoloration or other notable lesions.  Neurological: no evidence of tremors.  Psychiatric: no evident anxiety; fully alert and oriented with fluent speech.      The rest of a comprehensive physical examination is deferred due to nature of video visits.    Laboratory/Imaging Studies.  Prior to and including the day of this visit, I personally reviewed the recent imaging scans. I released the pertinent recent imaging results to [employer] in advance of this visit, and reviewed the summary verbatim and in lay language during today's call.  Results for YESENIA TORRES (MRN [id number]) as of 11/29/2021 09:07   Ref. Range 10/18/2018 12:36 4/18/2019 13:20 10/17/2019 11:35 4/24/2020 12:14 11/19/2021 08:45   Chromogranin A Latest Ref Range: 0 - 103 ng/mL 55 52 63 67 57       ASSESSMENT/PLAN:  47 year old man with pancreatic head insulinoma, who presented with hypoglycemic episodes for several years prior to diagnosis. He had Whipple surgery by Dr. Matthias Armenta on 12/08/2016 ([id number]).    He is 6 years out from resection of his pancreatic insulinoma.  He is doing well.  He has no objective evidence of tumor recurrence either radiographically or by biomarker symptoms.  He was very cognizant of the symptoms that led to diagnosis and he confirms very firmly that there is no evidence of the symptoms.  I would like him to reconnect with Dr. Ortiz [facility] and he and his wife will go online via [employer] to make an appointment.      I encouraged him to follow with Dr. Ortiz in terms of neuroendocrine tumor followup at this point.  We have been doing active surveillance for 6 years post surgery.  I discussed we can move forward either based on symptoms as the symptoms from the neuroendocrine tumor were so pronounced prior to resection and/or do scheduled radiographic surveillance.      He would  like to go forward with radiologic surveillance, so I will order an MRI abdomen and chromogranin A to be performed in 1 year's time and follow up with me within a few days to review the results.  I encouraged him to reach out to us should he develop any concerning symptoms such as presyncope, syncope, dizziness, vertigo, or any other symptoms that might be indicative of hypoglycemia.      I also strongly encouraged him to follow up with his primary care physician for primary care appropriate screening and colon cancer screening with colonoscopy.  He is age 47.  Per several study guidelines, the standard of care age to begin colon cancer screening is now 45, and I encouraged him to pursue that and other age appropriate screening as well.          VIRTUAL VISIT - DETAILS:    I have reviewed the note as documented above. This accurately captures the substance of my conversation with the patient.    Date of call: November 28, 2022   Start of call: 9:14 am  End of call: 9:23 am    Provider location: [facility] [place] (academic office)  Patient location: Home      Mode of Video Visit: Amwell           I spent 9 minutes in consultation, including history and discussion with the patient including review of recent lab values and radiologic imaging results.  An additional 13 minutes was spent on the day of the visit, including reviewing pertinent medical notes and documentation from other physicians and APPs who have evaluated and treated this patient, pertinent lab values, pathology and imaging results, personal review of radiologic images, discussing the case with referring providers and/or nurse coordinator team, post-visit orders, and all post-visit documentation.    Brandan Hanley MD PhD

## 2023-01-25 ENCOUNTER — LAB (OUTPATIENT)
Dept: LAB | Facility: CLINIC | Age: 48
End: 2023-01-25
Payer: COMMERCIAL

## 2023-01-25 ENCOUNTER — OFFICE VISIT (OUTPATIENT)
Dept: ENDOCRINOLOGY | Facility: CLINIC | Age: 48
End: 2023-01-25
Payer: COMMERCIAL

## 2023-01-25 VITALS
DIASTOLIC BLOOD PRESSURE: 78 MMHG | SYSTOLIC BLOOD PRESSURE: 126 MMHG | BODY MASS INDEX: 31.95 KG/M2 | HEART RATE: 74 BPM | HEIGHT: 75 IN | WEIGHT: 257 LBS

## 2023-01-25 DIAGNOSIS — R16.0 HEPATOMEGALY: ICD-10-CM

## 2023-01-25 DIAGNOSIS — Z98.890 HISTORY OF PANCREATIC SURGERY: Primary | ICD-10-CM

## 2023-01-25 DIAGNOSIS — D13.7 INSULINOMA: ICD-10-CM

## 2023-01-25 DIAGNOSIS — Z98.890 HISTORY OF PANCREATIC SURGERY: ICD-10-CM

## 2023-01-25 LAB
ALBUMIN SERPL BCG-MCNC: 4.5 G/DL (ref 3.5–5.2)
ALP SERPL-CCNC: 28 U/L (ref 40–129)
ALT SERPL W P-5'-P-CCNC: 59 U/L (ref 10–50)
AST SERPL W P-5'-P-CCNC: 47 U/L (ref 10–50)
BILIRUB DIRECT SERPL-MCNC: 0.2 MG/DL (ref 0–0.3)
BILIRUB SERPL-MCNC: 0.6 MG/DL
FASTING STATUS PATIENT QL REPORTED: NO
GLUCOSE SERPL-MCNC: 99 MG/DL (ref 70–99)
HBA1C MFR BLD: 5.6 %
PROT SERPL-MCNC: 6.8 G/DL (ref 6.4–8.3)

## 2023-01-25 PROCEDURE — 84681 ASSAY OF C-PEPTIDE: CPT | Mod: 90 | Performed by: PATHOLOGY

## 2023-01-25 PROCEDURE — 99214 OFFICE O/P EST MOD 30 MIN: CPT | Mod: GT

## 2023-01-25 PROCEDURE — 99000 SPECIMEN HANDLING OFFICE-LAB: CPT | Performed by: PATHOLOGY

## 2023-01-25 PROCEDURE — 84206 ASSAY OF PROINSULIN: CPT | Mod: 90 | Performed by: PATHOLOGY

## 2023-01-25 PROCEDURE — 80076 HEPATIC FUNCTION PANEL: CPT | Performed by: PATHOLOGY

## 2023-01-25 PROCEDURE — 82947 ASSAY GLUCOSE BLOOD QUANT: CPT | Performed by: PATHOLOGY

## 2023-01-25 PROCEDURE — 36415 COLL VENOUS BLD VENIPUNCTURE: CPT | Performed by: PATHOLOGY

## 2023-01-25 PROCEDURE — 83036 HEMOGLOBIN GLYCOSYLATED A1C: CPT | Mod: 90 | Performed by: PATHOLOGY

## 2023-01-25 ASSESSMENT — PAIN SCALES - GENERAL: PAINLEVEL: NO PAIN (0)

## 2023-01-25 NOTE — NURSING NOTE
"Chief Complaint   Patient presents with     Endocrine Problem     Pancreatic tumor     Vital signs:      BP: 126/78 Pulse: 74           Height: 190.5 cm (6' 3\") Weight: 116.6 kg (257 lb)  Estimated body mass index is 32.12 kg/m  as calculated from the following:    Height as of this encounter: 1.905 m (6' 3\").    Weight as of this encounter: 116.6 kg (257 lb).          "

## 2023-01-25 NOTE — LETTER
1/25/2023       RE: Joey Guerrero  [address]  [facility] [address]     Dear Colleague,    Thank you for referring your patient, Joey Guerrero, to the [facility] ENDOCRINOLOG[facility] [place] at [facility]. Please see a copy of my visit note below.    Joey is a 46 year old who is being evaluated via a billable video visit.      How would you like to obtain your AVS? MyChart  If the video visit is dropped, the invitation should be resent by: Text to cell phone: [phone]  Will anyone else be joining your video visit?   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 Tumor was 5.1 cm, not pathologically  malignant (as defined by distant mets, vascular or capsular invasion).  This does not fully exclude malignancy.   pT2, pNo, pMx, stage IB assuming no distant mets.   There were no elevated  markers measured prior to surgery , which will help us detect recurrence, other than the hypoglycemia/ hyperinsulinemia The primary tumor uptake of octreotide was not zero but it appeared quite low on the octreoscan. The proinsulin seemed to be the same with fasting and non-fasting.   proinsulin, glucose, c peptide, HgbA1c     S/p Whipple procedure -   on treatment of pancreatic exocrine replacement. This may be a risk for future diabetes development, depending on the volume of pancreatic tissue that was actually removed.      Pre-diabetes in the past.  Repeat A1c    Hepatomegaly - and increased LFTS .  Discussed again.  He hasn't had follow up with his PCP about.  Repeat LFTs with next labs       Chart review/prep time 1  [id number]   22 minutes spent on the date of the encounter doing chart review, history and exam, documentation and further activities as noted above.    Hilda Ortiz MD    Cc/HISTORY OF PRESENT ILLNESS  47- year old man  presentsfor follow up of history of insulinoma. I last saw him . At that time I ordered labs which were never drawn, despite my order being on the system for a year, now .     He continues to follow with Dr Hanley in Oncology.  They have continued with frequent imaging.  He had MRI abdomen  2022.  Both showed hepatomegaly.  Joey was not aware of this today.      See my 5/10/17 note for description of the presentation and diagnostic tests   His treatment course has been as follows:  16 FNA -EUS pancreatic head mass was positive for malignancy - neuroendocrine tumor.    16  open nonpylorus preserving Whipple procedure with cholecystectomy and feeding jejunostomy removing  pancreatic neuroendocrine tumor (5.1 cm) , intermediate grade (G2).  0/10 LN negative.  KI -67 in some areas of tumor up to 5% ( the synoptic states there were 10 mitoses /10 HPF, no LV invasion.  No IHC was performed .      We have been following neuroendocrine tumor markers as follows  17 :CGA 53, glucose 90  17: proinsulin < 1.6, C peptide 0.5, HgbA1c 5.1, CGA 62, glucose 88  18: proinsulin 2.1, C peptide 1.0, CGA 77  5/10/18: CGA 53, glucose 104  10/16/18: CGA 55, glucose 100  19: proinsulin 3.1 pM (< 8.0), C peptide 1.1, glucose 96,  HgbA1c 6.2% - followed appt,   10/17/19: CGA 63  2021 fasting   HgbA1c 5.6, CGA 57, C peptide 0.6, glucose 98, creatinine 1.01, ALT 78, AST 53, alk phos 27, bili 1.0   proinsulin pending  22 CGA 65     We have the following imaging studies:   10/31/16: MRI abdomen pancreas ([facility]): 3.9 cm mass pancreas with central necrosis. Mass encases 50% of adjacent SMV which remains patent. - Liver unremarkable.   16 CT abdomen: enhancing lesion with cystic component or central necrosis within head and uncinate process of pancreas concerning for malignancy.  1 cm indeterminate portacaval LN.   16 octreoscan: read as negative; I think the study shows  very faint activity at the pancreatic mass site.  6/5/17 MRI abdomen: ? Liver mass  4/18/19and 10/17/19 MRI abdomen: KIKO  11/20/2020 MRI Abdomen: hepatomegaly 23.5 cm   11/19/2021 MRI abdomen: hepatomegaly 20 cm    REVIEW OF SYSTEMS  Feels normal  Bed 830, up 4-5 AM,  Might not eat until noon  No spells  Some regain of weight lost        Past Medical History  Past Medical History:   Diagnosis Date     Hypoglycemia     due to insulinoma     Migraine      Pancreatic neuroendocrine tumor- insulinoma 2016     Ruptured appendix 2003     Tinea versicolor      Uncomplicated asthma         Past Surgical History:   Procedure Laterality Date     APPENDECTOMY       ENDOSCOPIC ULTRASOUND UPPER GASTROINTESTINAL TRACT (GI) N/A 11/18/2016    Procedure: ENDOSCOPIC ULTRASOUND, ESOPHAGOSCOPY / UPPER GASTROINTESTINAL TRACT (GI);  Surgeon: Guru Malathi Canas MD;  Location: [facility] OR     PICC INSERTION       WHIPPLE PROCEDURE N/A 12/8/2016    Procedure: WHIPPLE PROCEDURE;  Surgeon: Matthias Armenta MD;  Location: [facility] OR       Medications    Current Outpatient Medications   Medication Sig Dispense Refill     albuterol (PROAIR HFA, PROVENTIL HFA, VENTOLIN HFA) 108 (90 BASE) MCG/ACT inhaler Inhale 1-2 puffs into the lungs every 6 hours as needed for shortness of breath / dyspnea or wheezing       Ascorbic Acid (VITAMIN C PO) Take by mouth 2 times daily        ASPIRIN PO Take 81 mg by mouth       Cholecalciferol (VITAMIN D-3 PO) Take by mouth daily Pt unsure of dosage       fluticasone (FLOVENT HFA) 110 MCG/ACT inhaler Inhale 2 puffs into the lungs At Bedtime        Multiple Vitamin (MULTI-VITAMINS) TABS        Omega-3 Fatty Acids (OMEGA-3 FISH OIL PO) Take 1 g by mouth 2 times daily (with meals) 3 capsules       vitamin B complex with vitamin C (STRESS TAB) tablet Take 1 tablet by mouth daily       amylase-lipase-protease (CREON 6) 6000 units CPEP Take 1-2 capsules (6,000-12,000 Units) by mouth 3 times daily (with meals)  "(Patient not taking: Reported on 1/25/2023) 450 capsule 3     Probiotic Product (PROBIOTIC ADVANCED) CAPS Take by mouth daily  (Patient not taking: Reported on 11/29/2021)       Allergies  Allergies   Allergen Reactions     Cat Hair Extract      Runny Nose     Pollen Extract      Runny Nose     Family History  Family History   Problem Relation Age of Onset     Thyroid Disease Sister      Pancreatic Cancer No family hx of      Nephrolithiasis No family hx of      Brain Tumor No family hx of      Diabetes No family hx of      Social History  Social History     Tobacco Use     Smoking status: Never     Smokeless tobacco: Current     Types: Chew     Tobacco comments:     1/2 can daily   Substance Use Topics     Alcohol use: Yes     Comment: 6-7 beers every 2-3 weeks     Drug use: No      .   Cuts back on cars and fat when trying o lose weight  ETOH once/week 6-12    Physical Exam  /78 (BP Location: Right arm, Patient Position: Sitting, Cuff Size: Adult Regular)   Pulse 74   Ht 1.905 m (6' 3\")   Wt 116.6 kg (257 lb)   BMI 32.12 kg/m    Body mass index is 32.12 kg/m .  GENERAL: pleasant man in no distress  SKIN: Visible skin clear.  EYES: Eyes grossly normal to inspection.   RESP: No audible wheeze, cough, orincreased work of breathing.    NEURO: Awake, alert, responds appropriately to questions.  Mentation and speech fluent.  PSYCH:affect normal;  and appearance well-groomed.    DATA REVIEw    11/23/22 MRI ABDOMEN  CLINICAL HISTORY:  Head of pancreas insulinoma status post surgicalresection on imaging surveillance  TECHNIQUE:  Images were acquired with and without intravenous contrastthrough the abdomen. The following MR images were acquired: TrueFISP, multiplanar T2 weighted, axial T1 in/out of phase, axial fat-saturatedT1, diffusion-weighted additionally, MRCP was also performed.Multiplanar T1-weighted images with fat saturation were before contrast administration and at multiple time points following " theadministration of intravenous contrast. Contrast dose: 12 mL Gadavist  Comparison study: MRI abdomen 11/19/2021 and 4/20/2020.  FINDINGS:  Stable postoperative changes of prior vertebroplasty procedure.  Liver: Mildly enlarged, measuring 19.3 cm in craniocaudal dimension.No focal mass or cyst seen in the liver. The intrahepatic biliary ducts are not dilated. Portal vein and hepatic veins appear normal.   Gallbladder: Surgically absent.  Spleen: No splenomegaly. No focal mass or cyst in the spleen.  Kidneys: Left parapelvic renal cysts. No suspicious renal cortical. Nohydronephrosis.  Adrenal glands: Both adrenal glands appear normal.  Pancreas: Stable surgical changes of Whipple procedure. Body and tail  of pancreas are unremarkable. Stable prominent main pancreatic duct  upstream of the pancreaticojejunostomy measuring approximately 6 mm in  maximum dimension  Bowel: Stable anterior abdominal wall hernia with single bowel loopherniating through the defect seen in series 23, image 77. Remaining visualized bowel loops appear unremarkable   Lymph nodes: Enlarged upper aortocaval lymph node with restricted diffusion, as seen previously. It currently measures 18 mm as opposed to previous measurement of 12 mm however, compared to 4/24/2020 and earlier studies it measures the same, likely related to differences inslice selection/artifact on the most recent prior study and associatedmeasurement technique.  Blood vessels: Visualized blood vessels including the abdominal aortaand its branches and inferior vena cava appear unremarkable.  Lung bases: The visualized lung bases appear normal. No pleuraleffusion.  Bones and soft tissues: The visualized bones and soft tissues areunremarkable.  Mesentery and abdominal wall: No abnormalities seen within themesentery and anterior abdominal wall.  Ascites: Ascites.                                                             IMPRESSION:  1. Stable mildly enlarged aortocaval lymph  node dating back severalyears.   2. No other abnormality noted. No evidence of tumor recurrence statuspost Whipple procedure.  I have personally reviewed the examination and initial interpretationand I agree with the findings.  DO Eva KAPADIA MD

## 2023-01-25 NOTE — PROGRESS NOTES
Joey is a 46 year old who is being evaluated via a billable video visit.      How would you like to obtain your AVS? [employer]  If the video visit is dropped, the invitation should be resent by: Text to cell phone: [phone]  Will anyone else be joining your video visit?   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 Tumor was 5.1 cm, not pathologically  malignant (as defined by distant mets, vascular or capsular invasion).  This does not fully exclude malignancy.   pT2, pNo, pMx, stage IB assuming no distant mets.   There were no elevated  markers measured prior to surgery , which will help us detect recurrence, other than the hypoglycemia/ hyperinsulinemia The primary tumor uptake of octreotide was not zero but it appeared quite low on the octreoscan. The proinsulin seemed to be the same with fasting and non-fasting.   proinsulin, glucose, c peptide, HgbA1c     S/p Whipple procedure -   on treatment of pancreatic exocrine replacement. This may be a risk for future diabetes development, depending on the volume of pancreatic tissue that was actually removed.      Pre-diabetes in the past.  Repeat A1c    Hepatomegaly - and increased LFTS .  Discussed again.  He hasn't had follow up with his PCP about.  Repeat LFTs with next labs       Chart review/prep time 1  [id number]   22 minutes spent on the date of the encounter doing chart review, history and exam, documentation and further activities as noted above.    Hilda Ortiz MD    Cc/HISTORY OF PRESENT ILLNESS  47- year old man presentsfor follow up of history of insulinoma. I last saw him . At that time I ordered labs which were never drawn, despite my order being on the system for a year, now .     He continues to follow with Dr Hanley in Oncology.  They have continued with frequent imaging.  He had MRI abdomen  2022.  Both  showed hepatomegaly.  Joey was not aware of this today.      See my 5/10/17 note for description of the presentation and diagnostic tests   His treatment course has been as follows:  11/18/16 FNA -EUS pancreatic head mass was positive for malignancy - neuroendocrine tumor.    12/8/16  open nonpylorus preserving Whipple procedure with cholecystectomy and feeding jejunostomy removing  pancreatic neuroendocrine tumor (5.1 cm) , intermediate grade (G2).  0/10 LN negative.  KI -67 in some areas of tumor up to 5% ( the synoptic states there were 10 mitoses /10 HPF, no LV invasion.  No IHC was performed .      We have been following neuroendocrine tumor markers as follows  6/5/17 :CGA 53, glucose 90  9/12/17: proinsulin < 1.6, C peptide 0.5, HgbA1c 5.1, CGA 62, glucose 88  1/16/18: proinsulin 2.1, C peptide 1.0, CGA 77  5/10/18: CGA 53, glucose 104  10/16/18: CGA 55, glucose 100  1/28/19: proinsulin 3.1 pM (< 8.0), C peptide 1.1, glucose 96,  HgbA1c 6.2% - followed appt,   10/17/19: CGA 63  11/19/2021 fasting   HgbA1c 5.6, CGA 57, C peptide 0.6, glucose 98, creatinine 1.01, ALT 78, AST 53, alk phos 27, bili 1.0   proinsulin pending  11/23/22 CGA 65     We have the following imaging studies:   10/31/16: MRI abdomen pancreas ([facility]): 3.9 cm mass pancreas with central necrosis. Mass encases 50% of adjacent SMV which remains patent. - Liver unremarkable.   11/11/16 CT abdomen: enhancing lesion with cystic component or central necrosis within head and uncinate process of pancreas concerning for malignancy.  1 cm indeterminate portacaval LN.   12/1/16 octreoscan: read as negative; I think the study shows very faint activity at the pancreatic mass site.  6/5/17 MRI abdomen: ? Liver mass  4/18/19and 10/17/19 MRI abdomen: KIKO  11/20/2020 MRI Abdomen: hepatomegaly 23.5 cm   11/19/2021 MRI abdomen: hepatomegaly 20 cm    REVIEW OF SYSTEMS  Feels normal  Bed 830, up 4-5 AM,  Might not eat until noon  No spells  Some regain  of weight lost        Past Medical History  Past Medical History:   Diagnosis Date     Hypoglycemia     due to insulinoma     Migraine      Pancreatic neuroendocrine tumor- insulinoma 2016     Ruptured appendix 2003     Tinea versicolor      Uncomplicated asthma         Past Surgical History:   Procedure Laterality Date     APPENDECTOMY       ENDOSCOPIC ULTRASOUND UPPER GASTROINTESTINAL TRACT (GI) N/A 11/18/2016    Procedure: ENDOSCOPIC ULTRASOUND, ESOPHAGOSCOPY / UPPER GASTROINTESTINAL TRACT (GI);  Surgeon: Guru Malathi Canas MD;  Location: [facility] OR     PICC INSERTION       WHIPPLE PROCEDURE N/A 12/8/2016    Procedure: WHIPPLE PROCEDURE;  Surgeon: Matthias Armenta MD;  Location: [facility] OR       Medications    Current Outpatient Medications   Medication Sig Dispense Refill     albuterol (PROAIR HFA, PROVENTIL HFA, VENTOLIN HFA) 108 (90 BASE) MCG/ACT inhaler Inhale 1-2 puffs into the lungs every 6 hours as needed for shortness of breath / dyspnea or wheezing       Ascorbic Acid (VITAMIN C PO) Take by mouth 2 times daily        ASPIRIN PO Take 81 mg by mouth       Cholecalciferol (VITAMIN D-3 PO) Take by mouth daily Pt unsure of dosage       fluticasone (FLOVENT HFA) 110 MCG/ACT inhaler Inhale 2 puffs into the lungs At Bedtime        Multiple Vitamin (MULTI-VITAMINS) TABS        Omega-3 Fatty Acids (OMEGA-3 FISH OIL PO) Take 1 g by mouth 2 times daily (with meals) 3 capsules       vitamin B complex with vitamin C (STRESS TAB) tablet Take 1 tablet by mouth daily       amylase-lipase-protease (CREON 6) 6000 units CPEP Take 1-2 capsules (6,000-12,000 Units) by mouth 3 times daily (with meals) (Patient not taking: Reported on 1/25/2023) 450 capsule 3     Probiotic Product (PROBIOTIC ADVANCED) CAPS Take by mouth daily  (Patient not taking: Reported on 11/29/2021)       Allergies  Allergies   Allergen Reactions     Cat Hair Extract      Runny Nose     Pollen Extract      Runny Nose     Family History  Family  "History   Problem Relation Age of Onset     Thyroid Disease Sister      Pancreatic Cancer No family hx of      Nephrolithiasis No family hx of      Brain Tumor No family hx of      Diabetes No family hx of      Social History  Social History     Tobacco Use     Smoking status: Never     Smokeless tobacco: Current     Types: Chew     Tobacco comments:     1/2 can daily   Substance Use Topics     Alcohol use: Yes     Comment: 6-7 beers every 2-3 weeks     Drug use: No      .   Cuts back on cars and fat when trying o lose weight  ETOH once/week 6-12    Physical Exam  /78 (BP Location: Right arm, Patient Position: Sitting, Cuff Size: Adult Regular)   Pulse 74   Ht 1.905 m (6' 3\")   Wt 116.6 kg (257 lb)   BMI 32.12 kg/m    Body mass index is 32.12 kg/m .  GENERAL: pleasant man in no distress  SKIN: Visible skin clear.  EYES: Eyes grossly normal to inspection.   RESP: No audible wheeze, cough, orincreased work of breathing.    NEURO: Awake, alert, responds appropriately to questions.  Mentation and speech fluent.  PSYCH:affect normal;  and appearance well-groomed.    DATA REVIEw    11/23/22 MRI ABDOMEN  CLINICAL HISTORY:  Head of pancreas insulinoma status post surgicalresection on imaging surveillance  TECHNIQUE:  Images were acquired with and without intravenous contrastthrough the abdomen. The following MR images were acquired: TrueFISP, multiplanar T2 weighted, axial T1 in/out of phase, axial fat-saturatedT1, diffusion-weighted additionally, MRCP was also performed.Multiplanar T1-weighted images with fat saturation were before contrast administration and at multiple time points following theadministration of intravenous contrast. Contrast dose: 12 mL Gadavist  Comparison study: MRI abdomen 11/19/2021 and 4/20/2020.  FINDINGS:  Stable postoperative changes of prior vertebroplasty procedure.  Liver: Mildly enlarged, measuring 19.3 cm in craniocaudal dimension.No focal mass or cyst seen in the liver. The " intrahepatic biliary ducts are not dilated. Portal vein and hepatic veins appear normal.   Gallbladder: Surgically absent.  Spleen: No splenomegaly. No focal mass or cyst in the spleen.  Kidneys: Left parapelvic renal cysts. No suspicious renal cortical. Nohydronephrosis.  Adrenal glands: Both adrenal glands appear normal.  Pancreas: Stable surgical changes of Whipple procedure. Body and tail  of pancreas are unremarkable. Stable prominent main pancreatic duct  upstream of the pancreaticojejunostomy measuring approximately 6 mm in  maximum dimension  Bowel: Stable anterior abdominal wall hernia with single bowel loopherniating through the defect seen in series 23, image 77. Remaining visualized bowel loops appear unremarkable   Lymph nodes: Enlarged upper aortocaval lymph node with restricted diffusion, as seen previously. It currently measures 18 mm as opposed to previous measurement of 12 mm however, compared to 4/24/2020 and earlier studies it measures the same, likely related to differences inslice selection/artifact on the most recent prior study and associatedmeasurement technique.  Blood vessels: Visualized blood vessels including the abdominal aortaand its branches and inferior vena cava appear unremarkable.  Lung bases: The visualized lung bases appear normal. No pleuraleffusion.  Bones and soft tissues: The visualized bones and soft tissues areunremarkable.  Mesentery and abdominal wall: No abnormalities seen within themesentery and anterior abdominal wall.  Ascites: Ascites.                                                             IMPRESSION:  1. Stable mildly enlarged aortocaval lymph node dating back severalyears.   2. No other abnormality noted. No evidence of tumor recurrence statuspost Whipple procedure.  I have personally reviewed the examination and initial interpretationand I agree with the findings.  GERBER RAY, DO

## 2023-01-26 LAB — C PEPTIDE SERPL-MCNC: 1.3 NG/ML (ref 0.9–6.9)

## 2023-01-28 LAB — PROINSULIN P 12H FAST SERPL-SCNC: 2 PMOL/L

## 2023-04-08 ENCOUNTER — HEALTH MAINTENANCE LETTER (OUTPATIENT)
Age: 48
End: 2023-04-08

## 2023-11-20 NOTE — PROGRESS NOTES
Virtual Visit Details    Type of service:  Video Visit     Originating Location (pt. Location): Home    Distant Location (provider location):  On-site  Platform used for Video Visit: [facility]        Oncology follow-up Visit:  Nov 27, 2023    Diagnosis: [id number] pancreatic head insulinoma.  No evidence of metastatic disease.     Treatment to date: Whipple resection December 8, 2016, by Dr. Matthias Armenta. Zero of 10 lymph nodes involved, grade 2, negative resection margins, Ki-67 equals 5%.  preoperative serum chromogranin A = 70 on 12/05/2016.     COVID VACCINATION STATUS: confirmed 11/29/21.    REFERRING PHYSICIAN:  Dr. Matthias Armenta, Hepatobiliary Surgery, [facility].      Oncology History:  Over a 2-3 year period, beginning in 2014 or so, he was having hypoglycemic episodes of unexplained origin.  He was even hospitalized for one such episode in the fall of 2016.  He met with a primary care physician, and subsequently an endocrinologist.  There was concern for hypoglycemic cause and concern for potential insulinoma.  On 10/31/2016, MRI at [facility] revealed a 3.9 x 3.7 x 3.6 cm mass in the head of the pancreas.  There was involvement of the superior mesenteric vein, but no evidence of metastatic disease noted.  He met with Dr. Migel Sena at [facility], and a referral was made for surgical evaluation.      On 11/11/2016, a CT abdomen and pelvis was performed revealing an enhancing lesion with a cystic component versus central necrosis in the head and the uncinate process of the pancreas.  There was an indeterminate portacaval lymph node measuring 2.1 cm.  Otherwise, no evidence of suspicious adenopathy in the abdomen.  EUS was performed by Dr. Guru Canas at the [facility] on 11/18/2016.  He performed an FNA.  This revealed the tumor was composed of grade 1 neuroendocrine tumor with a low proliferation marker of Ki-67 less than 2%.  The tumor was positive for synaptophysin,  chromogranin and CD56.      On 12/01/2016, the patient underwent a preoperative octreotide scan that did not show any evidence of metastatic disease or tumor.  There was no radiotracer uptake.  A baseline chromogranin A was 70.      His C peptide at baseline was 0.7.  Insulin level at the preoperative baseline was 2.2 and 3.5.  Proinsulin levels were less than 7.5, and a subsequent level was 9.6.     On 12/05/2016, the patient met with Dr. Matthias Armenta, who took the patient to the operating room on 12/08/2016 to perform a Whipple.  The pancreaticoduodenectomy resulted in the revealing of a pancreatic neuroendocrine tumor at the head of the pancreas that measured 5.1 cm with intermediate grade G2.  Margins were negative by 0.1 cm.  Zero of 12 lymph nodes were involved.  The Ki-67 registered at 5% with 4 mitoses/10 high-power fields.  There was no perivascular, nor perineural invasion.  The pathologic stage was T2.  The pathologic N stage was N0, and M0 per the prior octreotide scan.  The patient is currently 9 weeks out from surgery.    2/6/17 - - medical oncology consultation, Dr. Hanley.  6/5/17 - - MRI abdomen: IMPRESSION:   1. Postsurgical of Whipple procedure evidence of locally recurrent  neuroendocrine tumor.  2. 1.2 cm ill-defined hepatic lesion in the central liver. This is  indeterminate and could represent sequela of recent episode of  inflammation or infection. However, it is new from prior and  metastases are not excluded. Consider short-term follow-up MRI.  3. Mildly increased size of pericaval lymph node and unchanged mildly  enlarged randy hepatis lymph node, indeterminate but favored to be  reactive.  6/9/17 - - oncology follow-up, Dr. Hanley.  9/12/17 - - endocrine consultation, Dr. Ortiz. Assessment of insulinoma: recommendation to follow Pro-insulin is a potential useful tumor marker for recurrence, to be followed by endocrinology, in context of glucose and C-peptide levels.  9/12/17 - - MRI  abdomen: IMPRESSION: In this patient with history of pancreatic head  insulinoma, post Whipple resection there is no evidence for metastatic  disease within the abdomen. Previously described lesion in the central  liver near the IVC is not well characterized in the present study.  9/15/17 - - oncology follow-up, Dr. Hanley.  1/16/18 - - MRI abdomen: IMPRESSION:   In this patient with history of insulinoma in the pancreatic head  status post Whipple procedure:  1. No evidence of metastatic disease within the abdomen.  2. Mild pancreatic duct ectasia measuring up to 6 mm. This is  unchanged to minimally increased when compared to the prior exam and  of uncertain clinical significance. No significant associated T1  abnormality to suggest pancreatitis.  1/19/18 - - oncology follow-up, Dr. Hanley.  5/10/18 - - MRI abdomen IMPRESSION: In this patient with history of neuroendocrine tumor of  the pancreas and subsequent Whipple procedure:  1. Slightly increased size of a precaval lymph node, currently  measuring 1.5 x 2.0 cm comparison measured 1.3 x 1.6 cm on 1/16/2018  exam. This is indeterminate  2. Mild persistent dilatation of the main pancreatic duct measuring 6mm.    5/14/18 - - oncology follow-up, Dr. Hanley.  6/12/18 - - patient was no-show for endocrinology visit with Dr. Ortiz.  10/18/18 - - MRI abdomen: IMPRESSION:   1. In this patient with history of pancreatic insulinoma status post  Whipple procedure, there is no evidence of locally recurrent or  metastatic disease.  2. Stable prominent portacaval lymph node.  10/22/18 - - oncology follow-up, Dr. Hanley.  4/18/19 - - MRI abdomen: IMPRESSION: Postoperative changes from Whipple procedure for  insulinoma without evidence of recurrent disease in the abdomen.  4/22/19 - - oncology follow-up, Dr. Hanley.    10/17/19--MRI abdomen IMPRESSION:   1-Stable Postoperative changes from Whipple procedure for insulinoma  without evidence of recurrent disease in the abdomen.   2-Stable  Oliva hernia containing nondilated short segment of small  bowel.   10/28/19--oncology follow-up, Dr. Hanley.    4/24/20--MRI abdomen - IMPRESSION:  1. Postsurgical changes of Whipple procedure with resection of  insulinoma. No evidence of recurrent disease.  2. Unchanged small right paramedian Oliva hernia containing a loop  of nondilated noninflamed small bowel.    5/1/20--oncology follow-up, Dr. Hanley.      11/20/20 MRI abdomen IMPRESSION:  1. Postsurgical changes of Whipple procedure with resection of  insulinoma. No evidence of residual/recurrent disease. No definite  metastasis in the abdomen and pelvis.  2. Unchanged small midline abdominal wall Oliva hernia containing a  loop of nondilated noninflamed small bowel.  3. Unchanged prominent portocaval lymph node, measuring 1.7 cm.  4. Hepatomegaly.     11/20/20 Chromogranin 60 ng/ml    11/30/2020 - follow up appointment with Dr. Hanley     11/19/21--MRI abdomen - IMPRESSION:  This patient with history of insulinoma status post resection:  1. Postsurgical changes of Whipple procedure without evidence for  recurrent tumor or metastatic disease.  2. Unchanged small midline abdominal wall Oliva hernia containing a  loop of nondilated noninflamed small bowel.  3. Decrease in size of the portacaval enlarged lymph node.    November 29, 2021 -- oncology follow-up/virtual visit, Dr. Hanley.    11/23/22--MRI abdomen--IMPRESSION:  1. Stable mildly enlarged aortocaval lymph node dating back several  years.   2. No other abnormality noted. No evidence of tumor recurrence status  post Whipple procedure.    November 28, 2022 -- oncology follow-up/virtual visit, Dr. Hanley.    11/22/23--MRI abdomen-IMPRESSION:   1. Stable postsurgical changes of Whipple procedure without evidence  of recurrent disease.  2. Unchanged enlarged aortocaval lymph node. No new lymphadenopathy.  November 27, 2023 -- oncology follow-up/virtual visit, Dr. Hanley.        Interim History/History Of Present  Illness:  Mr. Joey Guerrero is a 48 year old  man with pancreatic head insulinoma resected by Dr. Armenta on December 8, 2016.    He had surgical resection of his insulinoma tumor by Dr. Armenta 7 years ago and since that time, he has had no recurrence of the tumor.     He is doing well symptomatically, and denies any symptoms reminiscent of the ones he initially presented with leading up to diagnosis of insulinoma. He denies any dizziness, vertigo, or any other concerning symptoms at this time.  The follow-up MRI done last week shows no evidence of recurrent disease at this time. The radiologist in reviewing the MRI as in past years mentions presence of hepatomegaly, but otherwise completely normal hepatic features; he denies any routine alcohol use.       Latest Reference Range & Units 04/18/19 13:20 10/17/19 11:35 04/24/20 12:14 11/19/21 08:45 11/23/22 09:30   Chromogranin A 0 - 103 ng/mL 52 63 67 57 65       Review Of Systems:  Full 14-point ROS reviewed. Pertinent symptoms reviewed above per HPI.    Past medical, social, surgical, and family histories reviewed.     PAST MEDICAL HISTORY:  Pancreatic neuroendocrine tumor, pathologic T2 N0 M0 per above, status post Whipple surgery on 12/08/2016.  Other surgeries notable include an appendectomy in 2007.      FAMILY HISTORY:  Denies any family history of malignancy.      SOCIAL HISTORY:  Mr. Guerrero lives in [place], [place], with his wife.  They have 2 boys, ages 10 and 14 as of November 2022.   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    Tobacco:  Used to chew and smoke occasional cigarettes since the age of 18 until recently.    Alcohol:  As of Nov 2021: 6-12 beers per weekend, unspecified number of years.   Drugs:  Denies illicit drug use.     Allergies:  Allergies as of 11/27/2023 - Reviewed 01/25/2023   Allergen Reaction Noted     Cat hair extract  12/05/2016     Pollen extract  12/05/2016       Current Medications:  Current Outpatient  Medications   Medication Sig Dispense Refill     albuterol (PROAIR HFA, PROVENTIL HFA, VENTOLIN HFA) 108 (90 BASE) MCG/ACT inhaler Inhale 1-2 puffs into the lungs every 6 hours as needed for shortness of breath / dyspnea or wheezing       amylase-lipase-protease (CREON 6) 6000 units CPEP Take 1-2 capsules (6,000-12,000 Units) by mouth 3 times daily (with meals) (Patient not taking: Reported on 1/25/2023) 450 capsule 3     Ascorbic Acid (VITAMIN C PO) Take by mouth 2 times daily        ASPIRIN PO Take 81 mg by mouth       Cholecalciferol (VITAMIN D-3 PO) Take by mouth daily Pt unsure of dosage       fluticasone (FLOVENT HFA) 110 MCG/ACT inhaler Inhale 2 puffs into the lungs At Bedtime        Multiple Vitamin (MULTI-VITAMINS) TABS        Omega-3 Fatty Acids (OMEGA-3 FISH OIL PO) Take 1 g by mouth 2 times daily (with meals) 3 capsules       vitamin B complex with vitamin C (STRESS TAB) tablet Take 1 tablet by mouth daily          Physical Exam:  In-person physical exam could not be performed today in context of a Virtual Visit. Observed physical assessments made today by visualizing the patient by video link:  Vitals - Patient Reported  Pain Score: No Pain (0)             General/Constitutional: Generally appears well, not acutely ill.  HEENT: no scleral icterus, not jaundiced.  Respiratory: no labored breathing.  Musculoskeletal: appears to have full range of motion and adequate physical strength.  Skin: no jaundice, discoloration or other notable lesions.  Neurological: no evidence of tremors.  Psychiatric: no evident anxiety; fully alert and oriented with fluent speech.      The rest of a comprehensive physical examination is deferred due to nature of video visits.         Laboratory/Imaging Studies.  Prior to and including the day of this visit, I personally reviewed the recent imaging scans. I released the pertinent recent imaging results to [facility] [employer] in advance of this visit, and reviewed the summary verbatim and  in lay language during today's call.  Results for YESENIA TORRES (MRN [id number]) as of 11/29/2021 09:07   Ref. Range 10/18/2018 12:36 4/18/2019 13:20 10/17/2019 11:35 4/24/2020 12:14 11/19/2021 08:45   Chromogranin A Latest Ref Range: 0 - 103 ng/mL 55 52 63 67 57       ASSESSMENT/PLAN:  48 year old man with pancreatic head insulinoma, who presented with hypoglycemic episodes for several years prior to diagnosis. He had Whipple surgery by Dr. Matthias Armenta on 12/08/2016 ([id number]).    He is 7 years out from resection of his pancreatic insulinoma.  He is doing well.  He has no objective evidence of tumor recurrence either radiographically or by biomarker symptoms.  He was very cognizant of the symptoms that led to diagnosis and he confirms very firmly that there is no evidence of the symptoms.     We discussed risks vs. Potential benefits of continued radiographic surveillance, vs. Cessation of surveillance and only performing imaging should symptoms re-arise.  By the end he provided verbal consent to another MRI abdomen one year time; at that time, that will joyce eight years since his surgery, and if no recurrence has occurred by that time, then it will be reasonable to suspend routine radiographic surveillance in absence of symptoms.  I advised him to let me know if he develops any concerning symptoms.    For endocrinology follow-up, he already has in place a late January 2024 appointment with Dr. Ortiz.          VIRTUAL VISIT - DETAILS:    I have reviewed the note as documented above. This accurately captures the substance of my conversation with the patient.    Date of call: November 27, 2023   Start of call:  9:25 AM  End of call: 9:32 AM  Provider location: [facility] (academic office)  Patient location: Home      Mode of Video Visit: Jeromy         I spent 7 minutes in consultation, including history and discussion with the patient including review of recent lab values and radiologic imaging results.  An  additional 15 minutes was spent on the day of the visit, including reviewing pertinent medical notes and documentation from other physicians and APPs who have evaluated and treated this patient, pertinent lab values, pathology and imaging results, personal review of radiologic images, discussing the case with referring providers and/or nurse coordinator team, post-visit orders, and all post-visit documentation.    Brandan Hanley MD PhD

## 2023-11-22 ENCOUNTER — ANCILLARY PROCEDURE (OUTPATIENT)
Dept: MRI IMAGING | Facility: CLINIC | Age: 48
End: 2023-11-22
Attending: INTERNAL MEDICINE
Payer: COMMERCIAL

## 2023-11-22 ENCOUNTER — LAB (OUTPATIENT)
Dept: LAB | Facility: CLINIC | Age: 48
End: 2023-11-22
Payer: COMMERCIAL

## 2023-11-22 DIAGNOSIS — C25.4 INSULINOMA, MALIGNANT (H): ICD-10-CM

## 2023-11-22 PROCEDURE — 99000 SPECIMEN HANDLING OFFICE-LAB: CPT | Performed by: PATHOLOGY

## 2023-11-22 PROCEDURE — 86316 IMMUNOASSAY TUMOR OTHER: CPT | Mod: 90 | Performed by: PATHOLOGY

## 2023-11-22 PROCEDURE — 74183 MRI ABD W/O CNTR FLWD CNTR: CPT | Mod: GC | Performed by: RADIOLOGY

## 2023-11-22 PROCEDURE — 36415 COLL VENOUS BLD VENIPUNCTURE: CPT | Performed by: PATHOLOGY

## 2023-11-22 PROCEDURE — A9585 GADOBUTROL INJECTION: HCPCS | Mod: JZ | Performed by: RADIOLOGY

## 2023-11-22 RX ORDER — GADOBUTROL 604.72 MG/ML
10 INJECTION INTRAVENOUS ONCE
Status: COMPLETED | OUTPATIENT
Start: 2023-11-22 | End: 2023-11-22

## 2023-11-22 RX ADMIN — GADOBUTROL 10 ML: 604.72 INJECTION INTRAVENOUS at 10:55

## 2023-11-22 NOTE — DISCHARGE INSTRUCTIONS
MRI Contrast Discharge Instructions    The IV contrast you received today will pass out of your body in your  urine. This will happen in the next 24 hours. You will not feel this process.  Your urine will not change color.    Drink at least 4 extra glasses of water or juice today (unless your doctor  has restricted your fluids). This reduces the stress on your kidneys.  You may take your regular medicines.    If you are on dialysis: It is best to have dialysis today.    If you have a reaction: Most reactions happen right away. If you have  any new symptoms after leaving the hospital (such as hives or swelling),  call your hospital at the correct number below. Or call your family doctor.  If you have breathing distress or wheezing, call 911.    Special instructions: ***    I have read and understand the above information.    Signature:______________________________________ Date:___________    Staff:__________________________________________ Date:___________     Time:__________    [facility] Departments:    ___[place]: [phone]  ___[facility]: [phone]  ___[place]: [phone] ___[facility]: [phone]  ___[facility]: [phone]  ___[facility]: [phone]  ___[phone]  ___[facility]: [phone]  ___[phone]

## 2023-11-25 LAB — CGA SERPL-MCNC: 59 NG/ML

## 2023-11-27 ENCOUNTER — VIRTUAL VISIT (OUTPATIENT)
Dept: ONCOLOGY | Facility: CLINIC | Age: 48
End: 2023-11-27
Attending: INTERNAL MEDICINE
Payer: COMMERCIAL

## 2023-11-27 VITALS — BODY MASS INDEX: 28.6 KG/M2 | HEIGHT: 75 IN | WEIGHT: 230 LBS

## 2023-11-27 DIAGNOSIS — C25.4 INSULINOMA, MALIGNANT (H): Primary | ICD-10-CM

## 2023-11-27 PROCEDURE — 99213 OFFICE O/P EST LOW 20 MIN: CPT | Mod: VID | Performed by: INTERNAL MEDICINE

## 2023-11-27 ASSESSMENT — PAIN SCALES - GENERAL: PAINLEVEL: NO PAIN (0)

## 2023-11-27 NOTE — NURSING NOTE
Is the patient currently in the state of [place]? YES    Visit mode:VIDEO    If the visit is dropped, the patient can be reconnected by: VIDEO VISIT: Text to cell phone:   Telephone Information:   Mobile [phone]       Will anyone else be joining the visit? NO  (If patient encounters technical issues they should call [phone]    How would you like to obtain your AVS? MyChart    Are changes needed to the allergy or medication list? No    Reason for visit: RECHECK    Neris MCNEAL

## 2023-11-27 NOTE — LETTER
11/27/2023         RE: Joey Guerrero  [id number] [address] [facility] [id number]        Dear Colleague,    Thank you for referring your patient, Joey Guerrero, to the [facility]. Please see a copy of my visit note below.    Virtual Visit Details    Type of service:  Video Visit     Originating Location (pt. Location): Home    Distant Location (provider location):  On-site  Platform used for Video Visit: [facility]        Oncology follow-up Visit:  Nov 27, 2023    Diagnosis: [id number] pancreatic head insulinoma.  No evidence of metastatic disease.     Treatment to date: Whipple resection December 8, 2016, by Dr. Matthias Armenta. Zero of 10 lymph nodes involved, grade 2, negative resection margins, Ki-67 equals 5%.  preoperative serum chromogranin A = 70 on 12/05/2016.     COVID VACCINATION STATUS: confirmed 11/29/21.    REFERRING PHYSICIAN:  Dr. Matthias Armenta, Hepatobiliary Surgery, [facility].      Oncology History:  Over a 2-3 year period, beginning in 2014 or so, he was having hypoglycemic episodes of unexplained origin.  He was even hospitalized for one such episode in the fall of 2016.  He met with a primary care physician, and subsequently an endocrinologist.  There was concern for hypoglycemic cause and concern for potential insulinoma.  On 10/31/2016, MRI at [facility] revealed a 3.9 x 3.7 x 3.6 cm mass in the head of the pancreas.  There was involvement of the superior mesenteric vein, but no evidence of metastatic disease noted.  He met with Dr. Migel Sena at [facility], and a referral was made for surgical evaluation.      On 11/11/2016, a CT abdomen and pelvis was performed revealing an enhancing lesion with a cystic component versus central necrosis in the head and the uncinate process of the pancreas.  There was an indeterminate portacaval lymph node measuring 2.1 cm.  Otherwise, no evidence of suspicious adenopathy in the abdomen.  EUS was performed by Dr. Krause  Missael at the [facility] on 11/18/2016.  He performed an FNA.  This revealed the tumor was composed of grade 1 neuroendocrine tumor with a low proliferation marker of Ki-67 less than 2%.  The tumor was positive for synaptophysin, chromogranin and CD56.      On 12/01/2016, the patient underwent a preoperative octreotide scan that did not show any evidence of metastatic disease or tumor.  There was no radiotracer uptake.  A baseline chromogranin A was 70.      His C peptide at baseline was 0.7.  Insulin level at the preoperative baseline was 2.2 and 3.5.  Proinsulin levels were less than 7.5, and a subsequent level was 9.6.     On 12/05/2016, the patient met with Dr. Matthias Armenta, who took the patient to the operating room on 12/08/2016 to perform a Whipple.  The pancreaticoduodenectomy resulted in the revealing of a pancreatic neuroendocrine tumor at the head of the pancreas that measured 5.1 cm with intermediate grade G2.  Margins were negative by 0.1 cm.  Zero of 12 lymph nodes were involved.  The Ki-67 registered at 5% with 4 mitoses/10 high-power fields.  There was no perivascular, nor perineural invasion.  The pathologic stage was T2.  The pathologic N stage was N0, and M0 per the prior octreotide scan.  The patient is currently 9 weeks out from surgery.    2/6/17 - - medical oncology consultation, Dr. Hanley.  6/5/17 - - MRI abdomen: IMPRESSION:   1. Postsurgical of Whipple procedure evidence of locally recurrent  neuroendocrine tumor.  2. 1.2 cm ill-defined hepatic lesion in the central liver. This is  indeterminate and could represent sequela of recent episode of  inflammation or infection. However, it is new from prior and  metastases are not excluded. Consider short-term follow-up MRI.  3. Mildly increased size of pericaval lymph node and unchanged mildly  enlarged randy hepatis lymph node, indeterminate but favored to be  reactive.  6/9/17 - - oncology follow-up, Dr. Hanley.  9/12/17 - -  endocrine consultation, Dr. Ortiz. Assessment of insulinoma: recommendation to follow Pro-insulin is a potential useful tumor marker for recurrence, to be followed by endocrinology, in context of glucose and C-peptide levels.  9/12/17 - - MRI abdomen: IMPRESSION: In this patient with history of pancreatic head  insulinoma, post Whipple resection there is no evidence for metastatic  disease within the abdomen. Previously described lesion in the central  liver near the IVC is not well characterized in the present study.  9/15/17 - - oncology follow-up, Dr. Hanley.  1/16/18 - - MRI abdomen: IMPRESSION:   In this patient with history of insulinoma in the pancreatic head  status post Whipple procedure:  1. No evidence of metastatic disease within the abdomen.  2. Mild pancreatic duct ectasia measuring up to 6 mm. This is  unchanged to minimally increased when compared to the prior exam and  of uncertain clinical significance. No significant associated T1  abnormality to suggest pancreatitis.  1/19/18 - - oncology follow-up, Dr. Hanley.  5/10/18 - - MRI abdomen IMPRESSION: In this patient with history of neuroendocrine tumor of  the pancreas and subsequent Whipple procedure:  1. Slightly increased size of a precaval lymph node, currently  measuring 1.5 x 2.0 cm comparison measured 1.3 x 1.6 cm on 1/16/2018  exam. This is indeterminate  2. Mild persistent dilatation of the main pancreatic duct measuring 6mm.    5/14/18 - - oncology follow-up, Dr. Hanley.  6/12/18 - - patient was no-show for endocrinology visit with Dr. Ortiz.  10/18/18 - - MRI abdomen: IMPRESSION:   1. In this patient with history of pancreatic insulinoma status post  Whipple procedure, there is no evidence of locally recurrent or  metastatic disease.  2. Stable prominent portacaval lymph node.  10/22/18 - - oncology follow-up, Dr. Hanley.  4/18/19 - - MRI abdomen: IMPRESSION: Postoperative changes from Whipple procedure for  insulinoma without evidence of  recurrent disease in the abdomen.  4/22/19 - - oncology follow-up, Dr. Hanley.    10/17/19--MRI abdomen IMPRESSION:   1-Stable Postoperative changes from Whipple procedure for insulinoma  without evidence of recurrent disease in the abdomen.   2-Stable Oliva hernia containing nondilated short segment of small  bowel.   10/28/19--oncology follow-up, Dr. Hanley.    4/24/20--MRI abdomen - IMPRESSION:  1. Postsurgical changes of Whipple procedure with resection of  insulinoma. No evidence of recurrent disease.  2. Unchanged small right paramedian Oliva hernia containing a loop  of nondilated noninflamed small bowel.    5/1/20--oncology follow-up, Dr. Hanley.      11/20/20 MRI abdomen IMPRESSION:  1. Postsurgical changes of Whipple procedure with resection of  insulinoma. No evidence of residual/recurrent disease. No definite  metastasis in the abdomen and pelvis.  2. Unchanged small midline abdominal wall Oliva hernia containing a  loop of nondilated noninflamed small bowel.  3. Unchanged prominent portocaval lymph node, measuring 1.7 cm.  4. Hepatomegaly.     11/20/20 Chromogranin 60 ng/ml    11/30/2020 - follow up appointment with Dr. Hanley     11/19/21--MRI abdomen - IMPRESSION:  This patient with history of insulinoma status post resection:  1. Postsurgical changes of Whipple procedure without evidence for  recurrent tumor or metastatic disease.  2. Unchanged small midline abdominal wall Oliva hernia containing a  loop of nondilated noninflamed small bowel.  3. Decrease in size of the portacaval enlarged lymph node.    November 29, 2021 -- oncology follow-up/virtual visit, Dr. Hanley.    11/23/22--MRI abdomen--IMPRESSION:  1. Stable mildly enlarged aortocaval lymph node dating back several  years.   2. No other abnormality noted. No evidence of tumor recurrence status  post Whipple procedure.    November 28, 2022 -- oncology follow-up/virtual visit, Dr. Hanley.    11/22/23--MRI abdomen-IMPRESSION:   1. Stable postsurgical  changes of Whipple procedure without evidence  of recurrent disease.  2. Unchanged enlarged aortocaval lymph node. No new lymphadenopathy.  November 27, 2023 -- oncology follow-up/virtual visit, Dr. Hanley.        Interim History/History Of Present Illness:  Mr. Joey Guerrero is a 48 year old  man with pancreatic head insulinoma resected by Dr. Armenta on December 8, 2016.    He had surgical resection of his insulinoma tumor by Dr. Armenta 7 years ago and since that time, he has had no recurrence of the tumor.     He is doing well symptomatically, and denies any symptoms reminiscent of the ones he initially presented with leading up to diagnosis of insulinoma. He denies any dizziness, vertigo, or any other concerning symptoms at this time.  The follow-up MRI done last week shows no evidence of recurrent disease at this time. The radiologist in reviewing the MRI as in past years mentions presence of hepatomegaly, but otherwise completely normal hepatic features; he denies any routine alcohol use.       Latest Reference Range & Units 04/18/19 13:20 10/17/19 11:35 04/24/20 12:14 11/19/21 08:45 11/23/22 09:30   Chromogranin A 0 - 103 ng/mL 52 63 67 57 65       Review Of Systems:  Full 14-point ROS reviewed. Pertinent symptoms reviewed above per HPI.    Past medical, social, surgical, and family histories reviewed.     PAST MEDICAL HISTORY:  Pancreatic neuroendocrine tumor, pathologic T2 N0 M0 per above, status post Whipple surgery on 12/08/2016.  Other surgeries notable include an appendectomy in 2007.      FAMILY HISTORY:  Denies any family history of malignancy.      SOCIAL HISTORY:  Mr. Guerrero lives in [place], [place], with his wife.  They have 2 boys, ages 10 and 14 as of November 2022.   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    Tobacco:  Used to chew and smoke occasional cigarettes since the age of 18 until recently.    Alcohol:  As of Nov 2021: 6-12 beers per weekend, unspecified number of  years.   Drugs:  Denies illicit drug use.     Allergies:  Allergies as of 11/27/2023 - Reviewed 01/25/2023   Allergen Reaction Noted    Cat hair extract  12/05/2016    Pollen extract  12/05/2016       Current Medications:  Current Outpatient Medications   Medication Sig Dispense Refill    albuterol (PROAIR HFA, PROVENTIL HFA, VENTOLIN HFA) 108 (90 BASE) MCG/ACT inhaler Inhale 1-2 puffs into the lungs every 6 hours as needed for shortness of breath / dyspnea or wheezing      amylase-lipase-protease (CREON 6) 6000 units CPEP Take 1-2 capsules (6,000-12,000 Units) by mouth 3 times daily (with meals) (Patient not taking: Reported on 1/25/2023) 450 capsule 3    Ascorbic Acid (VITAMIN C PO) Take by mouth 2 times daily       ASPIRIN PO Take 81 mg by mouth      Cholecalciferol (VITAMIN D-3 PO) Take by mouth daily Pt unsure of dosage      fluticasone (FLOVENT HFA) 110 MCG/ACT inhaler Inhale 2 puffs into the lungs At Bedtime       Multiple Vitamin (MULTI-VITAMINS) TABS       Omega-3 Fatty Acids (OMEGA-3 FISH OIL PO) Take 1 g by mouth 2 times daily (with meals) 3 capsules      vitamin B complex with vitamin C (STRESS TAB) tablet Take 1 tablet by mouth daily          Physical Exam:  In-person physical exam could not be performed today in context of a Virtual Visit. Observed physical assessments made today by visualizing the patient by video link:  Vitals - Patient Reported  Pain Score: No Pain (0)             General/Constitutional: Generally appears well, not acutely ill.  HEENT: no scleral icterus, not jaundiced.  Respiratory: no labored breathing.  Musculoskeletal: appears to have full range of motion and adequate physical strength.  Skin: no jaundice, discoloration or other notable lesions.  Neurological: no evidence of tremors.  Psychiatric: no evident anxiety; fully alert and oriented with fluent speech.      The rest of a comprehensive physical examination is deferred due to nature of video visits.          Laboratory/Imaging Studies.  Prior to and including the day of this visit, I personally reviewed the recent imaging scans. I released the pertinent recent imaging results to [employer] in advance of this visit, and reviewed the summary verbatim and in lay language during today's call.  Results for YESENIA TORRES (MRN [id number]) as of 11/29/2021 09:07   Ref. Range 10/18/2018 12:36 4/18/2019 13:20 10/17/2019 11:35 4/24/2020 12:14 11/19/2021 08:45   Chromogranin A Latest Ref Range: 0 - 103 ng/mL 55 52 63 67 57       ASSESSMENT/PLAN:  48 year old man with pancreatic head insulinoma, who presented with hypoglycemic episodes for several years prior to diagnosis. He had Whipple surgery by Dr. Matthias Armenta on 12/08/2016 ([id number]).    He is 7 years out from resection of his pancreatic insulinoma.  He is doing well.  He has no objective evidence of tumor recurrence either radiographically or by biomarker symptoms.  He was very cognizant of the symptoms that led to diagnosis and he confirms very firmly that there is no evidence of the symptoms.     We discussed risks vs. Potential benefits of continued radiographic surveillance, vs. Cessation of surveillance and only performing imaging should symptoms re-arise.  By the end he provided verbal consent to another MRI abdomen one year time; at that time, that will joyce eight years since his surgery, and if no recurrence has occurred by that time, then it will be reasonable to suspend routine radiographic surveillance in absence of symptoms.  I advised him to let me know if he develops any concerning symptoms.    For endocrinology follow-up, he already has in place a late January 2024 appointment with Dr. Ortiz.          VIRTUAL VISIT - DETAILS:    I have reviewed the note as documented above. This accurately captures the substance of my conversation with the patient.    Date of call: November 27, 2023   Start of call:  9:25 AM  End of call: 9:32 AM  Provider location: [facility]  campus (academic office)  Patient location: Home      Mode of Video Visit: Jeromy JAUREGUI spent 7 minutes in consultation, including history and discussion with the patient including review of recent lab values and radiologic imaging results.  An additional 15 minutes was spent on the day of the visit, including reviewing pertinent medical notes and documentation from other physicians and APPs who have evaluated and treated this patient, pertinent lab values, pathology and imaging results, personal review of radiologic images, discussing the case with referring providers and/or nurse coordinator team, post-visit orders, and all post-visit documentation.    Brandan Hanley MD PhD

## 2024-06-09 ENCOUNTER — HEALTH MAINTENANCE LETTER (OUTPATIENT)
Age: 49
End: 2024-06-09

## 2024-11-11 ENCOUNTER — DOCUMENTATION ONLY (OUTPATIENT)
Dept: ONCOLOGY | Facility: CLINIC | Age: 49
End: 2024-11-11
Payer: COMMERCIAL

## 2024-11-11 DIAGNOSIS — D3A.8 NEUROENDOCRINE TUMOR (H): Primary | ICD-10-CM

## 2024-11-13 ENCOUNTER — LAB (OUTPATIENT)
Dept: LAB | Facility: CLINIC | Age: 49
End: 2024-11-13
Payer: COMMERCIAL

## 2024-11-13 ENCOUNTER — ANCILLARY PROCEDURE (OUTPATIENT)
Dept: MRI IMAGING | Facility: CLINIC | Age: 49
End: 2024-11-13
Attending: INTERNAL MEDICINE
Payer: COMMERCIAL

## 2024-11-13 DIAGNOSIS — D3A.8 NEUROENDOCRINE TUMOR (H): ICD-10-CM

## 2024-11-13 DIAGNOSIS — C25.4 INSULINOMA, MALIGNANT (H): ICD-10-CM

## 2024-11-13 LAB
ALBUMIN SERPL BCG-MCNC: 4.2 G/DL (ref 3.5–5.2)
ALP SERPL-CCNC: 27 U/L (ref 40–150)
ALT SERPL W P-5'-P-CCNC: 72 U/L (ref 0–70)
ANION GAP SERPL CALCULATED.3IONS-SCNC: 10 MMOL/L (ref 7–15)
AST SERPL W P-5'-P-CCNC: 78 U/L (ref 0–45)
BASOPHILS # BLD AUTO: 0 10E3/UL (ref 0–0.2)
BASOPHILS NFR BLD AUTO: 1 %
BILIRUB SERPL-MCNC: 1 MG/DL
BUN SERPL-MCNC: 26.8 MG/DL (ref 6–20)
CALCIUM SERPL-MCNC: 9.1 MG/DL (ref 8.8–10.4)
CHLORIDE SERPL-SCNC: 98 MMOL/L (ref 98–107)
CREAT SERPL-MCNC: 1.15 MG/DL (ref 0.67–1.17)
EGFRCR SERPLBLD CKD-EPI 2021: 77 ML/MIN/1.73M2
EOSINOPHIL # BLD AUTO: 0.2 10E3/UL (ref 0–0.7)
EOSINOPHIL NFR BLD AUTO: 4 %
ERYTHROCYTE [DISTWIDTH] IN BLOOD BY AUTOMATED COUNT: 13.8 % (ref 10–15)
GLUCOSE SERPL-MCNC: 120 MG/DL (ref 70–99)
HCO3 SERPL-SCNC: 27 MMOL/L (ref 22–29)
HCT VFR BLD AUTO: 44.2 % (ref 40–53)
HGB BLD-MCNC: 15.2 G/DL (ref 13.3–17.7)
IMM GRANULOCYTES # BLD: 0 10E3/UL
IMM GRANULOCYTES NFR BLD: 0 %
LYMPHOCYTES # BLD AUTO: 1.7 10E3/UL (ref 0.8–5.3)
LYMPHOCYTES NFR BLD AUTO: 36 %
MCH RBC QN AUTO: 33 PG (ref 26.5–33)
MCHC RBC AUTO-ENTMCNC: 34.4 G/DL (ref 31.5–36.5)
MCV RBC AUTO: 96 FL (ref 78–100)
MONOCYTES # BLD AUTO: 0.4 10E3/UL (ref 0–1.3)
MONOCYTES NFR BLD AUTO: 9 %
NEUTROPHILS # BLD AUTO: 2.4 10E3/UL (ref 1.6–8.3)
NEUTROPHILS NFR BLD AUTO: 51 %
NRBC # BLD AUTO: 0 10E3/UL
NRBC BLD AUTO-RTO: 0 /100
PLATELET # BLD AUTO: 211 10E3/UL (ref 150–450)
POTASSIUM SERPL-SCNC: 3.9 MMOL/L (ref 3.4–5.3)
PROT SERPL-MCNC: 6.6 G/DL (ref 6.4–8.3)
RBC # BLD AUTO: 4.6 10E6/UL (ref 4.4–5.9)
SODIUM SERPL-SCNC: 135 MMOL/L (ref 135–145)
WBC # BLD AUTO: 4.8 10E3/UL (ref 4–11)

## 2024-11-13 PROCEDURE — 85025 COMPLETE CBC W/AUTO DIFF WBC: CPT | Performed by: PATHOLOGY

## 2024-11-13 PROCEDURE — A9585 GADOBUTROL INJECTION: HCPCS | Performed by: RADIOLOGY

## 2024-11-13 PROCEDURE — 74183 MRI ABD W/O CNTR FLWD CNTR: CPT | Performed by: RADIOLOGY

## 2024-11-13 PROCEDURE — 36415 COLL VENOUS BLD VENIPUNCTURE: CPT | Performed by: PATHOLOGY

## 2024-11-13 PROCEDURE — 80053 COMPREHEN METABOLIC PANEL: CPT | Performed by: PATHOLOGY

## 2024-11-13 RX ORDER — GADOBUTROL 604.72 MG/ML
10 INJECTION INTRAVENOUS ONCE
Status: COMPLETED | OUTPATIENT
Start: 2024-11-13 | End: 2024-11-13

## 2024-11-13 RX ADMIN — GADOBUTROL 10 ML: 604.72 INJECTION INTRAVENOUS at 11:02

## 2024-11-15 NOTE — PROGRESS NOTES
Virtual Visit Details    Type of service:  Video Visit     Originating Location (pt. 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  No evidence of metastatic disease.     Treatment to date: Whipple resection December 8, 2016, by Dr. Matthias Armenta. Zero of 10 lymph nodes involved, grade 2, negative resection margins, Ki-67 equals 5%.  preoperative serum chromogranin A = 70 on 12/05/2016.     COVID VACCINATION STATUS: confirmed 11/29/21.    REFERRING PHYSICIAN:  Dr. Matthias Armenta, Hepatobiliary Surgery, [facility].      Oncology History:  Over a 2-3 year period, beginning in 2014 or so, he was having hypoglycemic episodes of unexplained origin.  He was even hospitalized for one such episode in the fall of 2016.  He met with a primary care physician, and subsequently an endocrinologist.  There was concern for hypoglycemic cause and concern for potential insulinoma.  On 10/31/2016, MRI at [facility] revealed a 3.9 x 3.7 x 3.6 cm mass in the head of the pancreas.  There was involvement of the superior mesenteric vein, but no evidence of metastatic disease noted.  He met with Dr. Migel Sena at [facility], and a referral was made for surgical evaluation.      On 11/11/2016, a CT abdomen and pelvis was performed revealing an enhancing lesion with a cystic component versus central necrosis in the head and the uncinate process of the pancreas.  There was an indeterminate portacaval lymph node measuring 2.1 cm.  Otherwise, no evidence of suspicious adenopathy in the abdomen.  EUS was performed by Dr. Guru Canas at the [facility] on 11/18/2016.  He performed an FNA.  This revealed the tumor was composed of grade 1 neuroendocrine tumor with a low proliferation marker of Ki-67 less than 2%.  The tumor  was positive for synaptophysin, chromogranin and CD56.      On 12/01/2016, the patient underwent a preoperative octreotide scan that did not show any evidence of metastatic disease or tumor.  There was no radiotracer uptake.  A baseline chromogranin A was 70.      His C peptide at baseline was 0.7.  Insulin level at the preoperative baseline was 2.2 and 3.5.  Proinsulin levels were less than 7.5, and a subsequent level was 9.6.     On 12/05/2016, the patient met with Dr. Matthias Armenta, who took the patient to the operating room on 12/08/2016 to perform a Whipple.  The pancreaticoduodenectomy resulted in the revealing of a pancreatic neuroendocrine tumor at the head of the pancreas that measured 5.1 cm with intermediate grade G2.  Margins were negative by 0.1 cm.  Zero of 12 lymph nodes were involved.  The Ki-67 registered at 5% with 4 mitoses/10 high-power fields.  There was no perivascular, nor perineural invasion.  The pathologic stage was T2.  The pathologic N stage was N0, and M0 per the prior octreotide scan.  The patient is currently 9 weeks out from surgery.    2/6/17 - - medical oncology consultation, Dr. Hanley.  6/5/17 - - MRI abdomen: IMPRESSION:   1. Postsurgical of Whipple procedure evidence of locally recurrent  neuroendocrine tumor.  2. 1.2 cm ill-defined hepatic lesion in the central liver. This is  indeterminate and could represent sequela of recent episode of  inflammation or infection. However, it is new from prior and  metastases are not excluded. Consider short-term follow-up MRI.  3. Mildly increased size of pericaval lymph node and unchanged mildly  enlarged randy hepatis lymph node, indeterminate but favored to be  reactive.  6/9/17 - - oncology follow-up, Dr. Hanley.  9/12/17 - - endocrine consultation, Dr. Ortiz. Assessment of insulinoma: recommendation to follow Pro-insulin is a potential useful tumor marker for recurrence, to be followed by endocrinology, in context of glucose and C-peptide  levels.  9/12/17 - - MRI abdomen: IMPRESSION: In this patient with history of pancreatic head  insulinoma, post Whipple resection there is no evidence for metastatic  disease within the abdomen. Previously described lesion in the central  liver near the IVC is not well characterized in the present study.  9/15/17 - - oncology follow-up, Dr. Hanley.  1/16/18 - - MRI abdomen: IMPRESSION:   In this patient with history of insulinoma in the pancreatic head  status post Whipple procedure:  1. No evidence of metastatic disease within the abdomen.  2. Mild pancreatic duct ectasia measuring up to 6 mm. This is  unchanged to minimally increased when compared to the prior exam and  of uncertain clinical significance. No significant associated T1  abnormality to suggest pancreatitis.  1/19/18 - - oncology follow-up, Dr. Hanley.  5/10/18 - - MRI abdomen IMPRESSION: In this patient with history of neuroendocrine tumor of  the pancreas and subsequent Whipple procedure:  1. Slightly increased size of a precaval lymph node, currently  measuring 1.5 x 2.0 cm comparison measured 1.3 x 1.6 cm on 1/16/2018  exam. This is indeterminate  2. Mild persistent dilatation of the main pancreatic duct measuring 6mm.    5/14/18 - - oncology follow-up, Dr. Hanley.  6/12/18 - - patient was no-show for endocrinology visit with Dr. Ortiz.  10/18/18 - - MRI abdomen: IMPRESSION:   1. In this patient with history of pancreatic insulinoma status post  Whipple procedure, there is no evidence of locally recurrent or  metastatic disease.  2. Stable prominent portacaval lymph node.  10/22/18 - - oncology follow-up, Dr. Hanley.  4/18/19 - - MRI abdomen: IMPRESSION: Postoperative changes from Whipple procedure for  insulinoma without evidence of recurrent disease in the abdomen.  4/22/19 - - oncology follow-up, Dr. Hanley.    10/17/19--MRI abdomen IMPRESSION:   1-Stable Postoperative changes from Whipple procedure for insulinoma  without evidence of recurrent disease  in the abdomen.   2-Stable Oliva hernia containing nondilated short segment of small  bowel.   10/28/19--oncology follow-up, Dr. Hanley.    4/24/20--MRI abdomen - IMPRESSION:  1. Postsurgical changes of Whipple procedure with resection of  insulinoma. No evidence of recurrent disease.  2. Unchanged small right paramedian Oliva hernia containing a loop  of nondilated noninflamed small bowel.    5/1/20--oncology follow-up, Dr. Hanley.      11/20/20 MRI abdomen IMPRESSION:  1. Postsurgical changes of Whipple procedure with resection of  insulinoma. No evidence of residual/recurrent disease. No definite  metastasis in the abdomen and pelvis.  2. Unchanged small midline abdominal wall Oliva hernia containing a  loop of nondilated noninflamed small bowel.  3. Unchanged prominent portocaval lymph node, measuring 1.7 cm.  4. Hepatomegaly.     11/20/20 Chromogranin 60 ng/ml    11/30/2020 - follow up appointment with Dr. Hanley     11/19/21--MRI abdomen - IMPRESSION:  This patient with history of insulinoma status post resection:  1. Postsurgical changes of Whipple procedure without evidence for  recurrent tumor or metastatic disease.  2. Unchanged small midline abdominal wall Oliva hernia containing a  loop of nondilated noninflamed small bowel.  3. Decrease in size of the portacaval enlarged lymph node.    November 29, 2021 -- oncology follow-up/virtual visit, Dr. Hanley.    11/23/22--MRI abdomen--IMPRESSION:  1. Stable mildly enlarged aortocaval lymph node dating back several  years.   2. No other abnormality noted. No evidence of tumor recurrence status  post Whipple procedure.    November 28, 2022 -- oncology follow-up/virtual visit, Dr. Hanley.    11/22/23--MRI abdomen-IMPRESSION:   1. Stable postsurgical changes of Whipple procedure without evidence  of recurrent disease.  2. Unchanged enlarged aortocaval lymph node. No new lymphadenopathy.  November 27, 2023 -- oncology follow-up/virtual visit, Dr. Hanley.    11/13/24--MRI  abdomen-IMPRESSION:   1. Stable postsurgical changes of Whipple procedure without evidence  of recurrent disease.  2. Enlarged aortocaval node, relatively stable since since 2017. No  new lymphadenopathy.  November 15, 2024 -- oncology follow-up/virtual visit, Dr. Hanley.       Interim History/History Of Present Illness:  Mr. Joey Guerrero is a 49 year old  man with pancreatic head insulinoma resected by Dr. Armenta on December 8, 2016. He had surgical resection of his insulinoma tumor by Dr. Armenta in December 2016 and since that time, he has had no evident recurrence of the tumor.     He joins me today for virtual video visit from his home in [place], accompanied by his wife, Lisa.  He is generally doing well, however, he had two episodes of lightheadedness that occurred during time off; one over the July 4 weekend 2024 while he was enjoying time at a cabin, and another time around Labor Day weekend 2024 while [place] were at a resort.  The scenario each of those incidents had in common was that he had multiple drinks of alcohol on a relatively empty stomach, he felt lightheaded and felt some tingling in his face and lips as a very familiar feeling to similar symptoms that initially lead up to his diagnosis of insulinoma in late 2016.  In each case, he returned to the room and ate some food and the symptoms self resolved within minutes or so.  He has not had any further incidents, and his next scheduled endocrinology follow-up with Dr. Ortiz from our end of the team is scheduled for February 26 2025.  I encouraged him today to reach out to her team to let her know of the two incidents that he shares with me today.    As of next month, he will be eight years out from the Whipple procedure performed by Dr. Armenta in December 2016, and the November 13 MRI shows no evidence of recurrence of insulinoma.  CBC is unremarkable, and CMP is also relatively unremarkable, other than a mildly high BUN; I specifically  note for him and Lisa that the glucose level was 120 does not show evidence of hypoglycemia on the date the blood was drawn.    Review Of Systems:  Full 14-point ROS reviewed. Pertinent symptoms reviewed above per HPI.    Past medical, social, surgical, and family histories reviewed.     PAST MEDICAL HISTORY:  Pancreatic neuroendocrine tumor, pathologic T2 N0 M0 per above, status post Whipple surgery on 12/08/2016.  Other surgeries notable include an appendectomy in 2007.      FAMILY HISTORY:  Denies any family history of malignancy.      SOCIAL HISTORY:  Mr. Guerrero lives in [place], [place], with his wife.  They have 2 boys, ages 10 and 14 as of November 2022.   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    Tobacco:  Used to chew and smoke occasional cigarettes since the age of 18 until recently.    Alcohol:  As of Nov 2021: 6-12 beers per weekend, unspecified number of years.   Drugs:  Denies illicit drug use.     Allergies:  Allergies as of 11/18/2024 - Reviewed 11/27/2023   Allergen Reaction Noted    Cat hair extract  12/05/2016    Pollen extract  12/05/2016       Current Medications:  Current Outpatient Medications   Medication Sig Dispense Refill    albuterol (PROAIR HFA, PROVENTIL HFA, VENTOLIN HFA) 108 (90 BASE) MCG/ACT inhaler Inhale 1-2 puffs into the lungs every 6 hours as needed for shortness of breath / dyspnea or wheezing      amylase-lipase-protease (CREON 6) 6000 units CPEP Take 1-2 capsules (6,000-12,000 Units) by mouth 3 times daily (with meals) 450 capsule 3    Ascorbic Acid (VITAMIN C PO) Take by mouth 2 times daily       ASPIRIN PO Take 81 mg by mouth (Patient not taking: Reported on 11/27/2023)      Cholecalciferol (VITAMIN D-3 PO) Take by mouth daily Pt unsure of dosage      fluticasone (FLOVENT HFA) 110 MCG/ACT inhaler Inhale 2 puffs into the lungs At Bedtime       Multiple Vitamin (MULTI-VITAMINS) TABS       Omega-3 Fatty Acids (OMEGA-3 FISH OIL PO) Take 1 g by mouth 2  times daily (with meals) 3 capsules      vitamin B complex with vitamin C (STRESS TAB) tablet Take 1 tablet by mouth daily          Physical Exam:  In-person physical exam could not be performed today in context of a Virtual Visit. Observed physical assessments made today by visualizing the patient by video link:  Vitals - Patient Reported  Pain Score: No Pain (0)             General/Constitutional: Generally appears well, not acutely ill.  HEENT: no scleral icterus, not jaundiced.  Respiratory: no labored breathing.  Musculoskeletal: appears to have full range of motion and adequate physical strength.  Skin: no jaundice, discoloration or other notable lesions.  Neurological: no evidence of tremors.  Psychiatric: no evident anxiety; fully alert and oriented with fluent speech.      The rest of a comprehensive physical examination is deferred due to nature of video visits.       Laboratory/Imaging Studies.  Prior to and including the day of this visit, I personally reviewed the recent imaging scans. I released the pertinent recent imaging results to [employer] in advance of this visit, and reviewed the summary verbatim and in lay language during today's call.    ASSESSMENT/PLAN:  49 year old man with pancreatic head insulinoma, who presented with hypoglycemic episodes for several years prior to diagnosis. He had Whipple surgery by Dr. Matthias Armenta on 12/08/2016 ([id number]).    He is 8 years out from resection of his pancreatic insulinoma.  He is doing well.  He has no objective evidence of tumor recurrence either radiographically or by biomarker symptoms.  He was very cognizant of the symptoms that led to diagnosis and he confirms very firmly that there is no evidence of the symptoms. We discussed risks vs. Potential benefits of continued radiographic surveillance, vs. Cessation of surveillance and only performing imaging should symptoms re-arise.  By the end he provided verbal consent to another MRI abdomen one year time;  at that time, that will joyce 9 (!) years since his surgery, and if no recurrence has occurred by that time, then it will be reasonable to suspend routine radiographic surveillance in absence of symptoms.  I advised him to let me know if he develops any concerning symptoms.    We reviewed in depth the two incidents that occurred in early and late summer of 2024, that in the absence of any slurred speech, or any other TIA like symptoms, likely represented some form of mild hypoglycemia in the setting of having had a Whipple procedure in 2016, and also in the context of having a relatively empty stomach.  I encouraged him to generally limit his alcohol intake and when he does have alcohol to eat something in advance; I think it would be prudent for him to also apprise Dr. Ortiz of those two incidents, and he and Lisa agreed to reach out to her team to let her know.  Otherwise, he will follow up with her as planned on February 26, and I offered continued surveillance, and he agreed; I will order MRI abdomen and CBC and CMP to be performed approximately one year time, and then he will follow up with me to review the results.    From a overall primary care and cancer screening perspective, I encouraged him to follow through in a standing order he has through his primary care team for colorectal cancer screening be a colonoscopy, which he has never done thus far. He confirmed that he will follow up with his primary care provider to move forward with that, any other age-appropriate noncancer related care.      VIRTUAL VISIT - DETAILS:    I have reviewed the note as documented above. This accurately captures the substance of my conversation with the patient.    Date of call: November 18, 2024   Start of call: 10:19 am  End of call: 10:30 am    Provider location: [facility] (academic office)  Patient location: Home      Mode of Video Visit: Jeromy           I spent 11 minutes in consultation, including history and  discussion with the patient including review of recent lab values and radiologic imaging results.  An additional 18 minutes was spent on the day of the visit, including reviewing pertinent medical notes and documentation from other physicians and APPs who have evaluated and treated this patient, pertinent lab values, pathology and imaging results, personal review of radiologic images, discussing the case with referring providers and/or nurse coordinator team, post-visit orders, and all post-visit documentation.    Brandan Hanley MD PhD

## 2024-11-18 ENCOUNTER — VIRTUAL VISIT (OUTPATIENT)
Dept: ONCOLOGY | Facility: CLINIC | Age: 49
End: 2024-11-18
Attending: INTERNAL MEDICINE
Payer: COMMERCIAL

## 2024-11-18 VITALS — WEIGHT: 240 LBS | BODY MASS INDEX: 30.8 KG/M2 | HEIGHT: 74 IN

## 2024-11-18 DIAGNOSIS — D13.7 INSULINOMA: Primary | ICD-10-CM

## 2024-11-18 ASSESSMENT — PAIN SCALES - GENERAL: PAINLEVEL_OUTOF10: NO PAIN (0)

## 2024-11-18 NOTE — LETTER
11/18/2024      Joey Guerrero  [id number] [address] [place] [id number]      Dear Colleague,    Thank you for referring your patient, Joey Guerrero, to the [facility]. Please see a copy of my visit note below.    Virtual Visit Details    Type of service:  Video Visit     Originating Location (pt. 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  No evidence of metastatic disease.     Treatment to date: Whipple resection December 8, 2016, by Dr. Matthias Armenta. Zero of 10 lymph nodes involved, grade 2, negative resection margins, Ki-67 equals 5%.  preoperative serum chromogranin A = 70 on 12/05/2016.     COVID VACCINATION STATUS: confirmed 11/29/21.    REFERRING PHYSICIAN:  Dr. Matthias Armenta, Hepatobiliary Surgery, [facility].      Oncology History:  Over a 2-3 year period, beginning in 2014 or so, he was having hypoglycemic episodes of unexplained origin.  He was even hospitalized for one such episode in the fall of 2016.  He met with a primary care physician, and subsequently an endocrinologist.  There was concern for hypoglycemic cause and concern for potential insulinoma.  On 10/31/2016, MRI at [facility] revealed a 3.9 x 3.7 x 3.6 cm mass in the head of the pancreas.  There was involvement of the superior mesenteric vein, but no evidence of metastatic disease noted.  He met with Dr. Migel Sena at [facility], and a referral was made for surgical evaluation.      On 11/11/2016, a CT abdomen and pelvis was performed revealing an enhancing lesion with a cystic component versus central necrosis in the head and the uncinate process of the pancreas.  There was an indeterminate portacaval lymph node measuring 2.1 cm.  Otherwise, no evidence of suspicious adenopathy in the abdomen.  EUS was performed  by Dr. Guru Canas at the [facility] on 11/18/2016.  He performed an FNA.  This revealed the tumor was composed of grade 1 neuroendocrine tumor with a low proliferation marker of Ki-67 less than 2%.  The tumor was positive for synaptophysin, chromogranin and CD56.      On 12/01/2016, the patient underwent a preoperative octreotide scan that did not show any evidence of metastatic disease or tumor.  There was no radiotracer uptake.  A baseline chromogranin A was 70.      His C peptide at baseline was 0.7.  Insulin level at the preoperative baseline was 2.2 and 3.5.  Proinsulin levels were less than 7.5, and a subsequent level was 9.6.     On 12/05/2016, the patient met with Dr. Matthias Armenta, who took the patient to the operating room on 12/08/2016 to perform a Whipple.  The pancreaticoduodenectomy resulted in the revealing of a pancreatic neuroendocrine tumor at the head of the pancreas that measured 5.1 cm with intermediate grade G2.  Margins were negative by 0.1 cm.  Zero of 12 lymph nodes were involved.  The Ki-67 registered at 5% with 4 mitoses/10 high-power fields.  There was no perivascular, nor perineural invasion.  The pathologic stage was T2.  The pathologic N stage was N0, and M0 per the prior octreotide scan.  The patient is currently 9 weeks out from surgery.    2/6/17 - - medical oncology consultation, Dr. Hanley.  6/5/17 - - MRI abdomen: IMPRESSION:   1. Postsurgical of Whipple procedure evidence of locally recurrent  neuroendocrine tumor.  2. 1.2 cm ill-defined hepatic lesion in the central liver. This is  indeterminate and could represent sequela of recent episode of  inflammation or infection. However, it is new from prior and  metastases are not excluded. Consider short-term follow-up MRI.  3. Mildly increased size of pericaval lymph node and unchanged mildly  enlarged randy hepatis lymph node, indeterminate but favored to be  reactive.  6/9/17 - - oncology follow-up,   Dayan.  9/12/17 - - endocrine consultation, Dr. Ortiz. Assessment of insulinoma: recommendation to follow Pro-insulin is a potential useful tumor marker for recurrence, to be followed by endocrinology, in context of glucose and C-peptide levels.  9/12/17 - - MRI abdomen: IMPRESSION: In this patient with history of pancreatic head  insulinoma, post Whipple resection there is no evidence for metastatic  disease within the abdomen. Previously described lesion in the central  liver near the IVC is not well characterized in the present study.  9/15/17 - - oncology follow-up, Dr. Hanley.  1/16/18 - - MRI abdomen: IMPRESSION:   In this patient with history of insulinoma in the pancreatic head  status post Whipple procedure:  1. No evidence of metastatic disease within the abdomen.  2. Mild pancreatic duct ectasia measuring up to 6 mm. This is  unchanged to minimally increased when compared to the prior exam and  of uncertain clinical significance. No significant associated T1  abnormality to suggest pancreatitis.  1/19/18 - - oncology follow-up, Dr. Hanley.  5/10/18 - - MRI abdomen IMPRESSION: In this patient with history of neuroendocrine tumor of  the pancreas and subsequent Whipple procedure:  1. Slightly increased size of a precaval lymph node, currently  measuring 1.5 x 2.0 cm comparison measured 1.3 x 1.6 cm on 1/16/2018  exam. This is indeterminate  2. Mild persistent dilatation of the main pancreatic duct measuring 6mm.    5/14/18 - - oncology follow-up, Dr. Hanley.  6/12/18 - - patient was no-show for endocrinology visit with Dr. Ortiz.  10/18/18 - - MRI abdomen: IMPRESSION:   1. In this patient with history of pancreatic insulinoma status post  Whipple procedure, there is no evidence of locally recurrent or  metastatic disease.  2. Stable prominent portacaval lymph node.  10/22/18 - - oncology follow-up, Dr. Hanley.  4/18/19 - - MRI abdomen: IMPRESSION: Postoperative changes from Whipple procedure for  insulinoma  without evidence of recurrent disease in the abdomen.  4/22/19 - - oncology follow-up, Dr. Hanley.    10/17/19--MRI abdomen IMPRESSION:   1-Stable Postoperative changes from Whipple procedure for insulinoma  without evidence of recurrent disease in the abdomen.   2-Stable Oliva hernia containing nondilated short segment of small  bowel.   10/28/19--oncology follow-up, Dr. Hanley.    4/24/20--MRI abdomen - IMPRESSION:  1. Postsurgical changes of Whipple procedure with resection of  insulinoma. No evidence of recurrent disease.  2. Unchanged small right paramedian Oliva hernia containing a loop  of nondilated noninflamed small bowel.    5/1/20--oncology follow-up, Dr. Hanley.      11/20/20 MRI abdomen IMPRESSION:  1. Postsurgical changes of Whipple procedure with resection of  insulinoma. No evidence of residual/recurrent disease. No definite  metastasis in the abdomen and pelvis.  2. Unchanged small midline abdominal wall Oliva hernia containing a  loop of nondilated noninflamed small bowel.  3. Unchanged prominent portocaval lymph node, measuring 1.7 cm.  4. Hepatomegaly.     11/20/20 Chromogranin 60 ng/ml    11/30/2020 - follow up appointment with Dr. Hanley     11/19/21--MRI abdomen - IMPRESSION:  This patient with history of insulinoma status post resection:  1. Postsurgical changes of Whipple procedure without evidence for  recurrent tumor or metastatic disease.  2. Unchanged small midline abdominal wall Oliva hernia containing a  loop of nondilated noninflamed small bowel.  3. Decrease in size of the portacaval enlarged lymph node.    November 29, 2021 -- oncology follow-up/virtual visit, Dr. Hanley.    11/23/22--MRI abdomen--IMPRESSION:  1. Stable mildly enlarged aortocaval lymph node dating back several  years.   2. No other abnormality noted. No evidence of tumor recurrence status  post Whipple procedure.    November 28, 2022 -- oncology follow-up/virtual visit, Dr. Hanley.    11/22/23--MRI abdomen-IMPRESSION:   1.  Stable postsurgical changes of Whipple procedure without evidence  of recurrent disease.  2. Unchanged enlarged aortocaval lymph node. No new lymphadenopathy.  November 27, 2023 -- oncology follow-up/virtual visit, Dr. Hanley.    11/13/24--MRI abdomen-IMPRESSION:   1. Stable postsurgical changes of Whipple procedure without evidence  of recurrent disease.  2. Enlarged aortocaval node, relatively stable since since 2017. No  new lymphadenopathy.  November 15, 2024 -- oncology follow-up/virtual visit, Dr. Hanley.       Interim History/History Of Present Illness:  Mr. Joey Guerrero is a 49 year old  man with pancreatic head insulinoma resected by Dr. Armenta on December 8, 2016. He had surgical resection of his insulinoma tumor by Dr. Armenta in December 2016 and since that time, he has had no evident recurrence of the tumor.     He joins me today for virtual video visit from his home in [place], accompanied by his wife, Lisa.  He is generally doing well, however, he had two episodes of lightheadedness that occurred during time off; one over the July 4 weekend 2024 while he was enjoying time at a cabin, and another time around Labor Day weekend 2024 while [place] were at a resort.  The scenario each of those incidents had in common was that he had multiple drinks of alcohol on a relatively empty stomach, he felt lightheaded and felt some tingling in his face and lips as a very familiar feeling to similar symptoms that initially lead up to his diagnosis of insulinoma in late 2016.  In each case, he returned to the room and ate some food and the symptoms self resolved within minutes or so.  He has not had any further incidents, and his next scheduled endocrinology follow-up with Dr. Ortiz from our end of the team is scheduled for February 26 2025.  I encouraged him today to reach out to her team to let her know of the two incidents that he shares with me today.    As of next month, he will be eight years out from the  Whipple procedure performed by Dr. Armenta in December 2016, and the November 13 MRI shows no evidence of recurrence of insulinoma.  CBC is unremarkable, and CMP is also relatively unremarkable, other than a mildly high BUN; I specifically note for him and Ilsa that the glucose level was 120 does not show evidence of hypoglycemia on the date the blood was drawn.    Review Of Systems:  Full 14-point ROS reviewed. Pertinent symptoms reviewed above per HPI.    Past medical, social, surgical, and family histories reviewed.     PAST MEDICAL HISTORY:  Pancreatic neuroendocrine tumor, pathologic T2 N0 M0 per above, status post Whipple surgery on 12/08/2016.  Other surgeries notable include an appendectomy in 2007.      FAMILY HISTORY:  Denies any family history of malignancy.      SOCIAL HISTORY:  Mr. Guerrero lives in [place], [place], with his wife.  They have 2 boys, ages 10 and 14 as of November 2022.   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    Tobacco:  Used to chew and smoke occasional cigarettes since the age of 18 until recently.    Alcohol:  As of Nov 2021: 6-12 beers per weekend, unspecified number of years.   Drugs:  Denies illicit drug use.     Allergies:  Allergies as of 11/18/2024 - Reviewed 11/27/2023   Allergen Reaction Noted     Cat hair extract  12/05/2016     Pollen extract  12/05/2016       Current Medications:  Current Outpatient Medications   Medication Sig Dispense Refill     albuterol (PROAIR HFA, PROVENTIL HFA, VENTOLIN HFA) 108 (90 BASE) MCG/ACT inhaler Inhale 1-2 puffs into the lungs every 6 hours as needed for shortness of breath / dyspnea or wheezing       amylase-lipase-protease (CREON 6) 6000 units CPEP Take 1-2 capsules (6,000-12,000 Units) by mouth 3 times daily (with meals) 450 capsule 3     Ascorbic Acid (VITAMIN C PO) Take by mouth 2 times daily        ASPIRIN PO Take 81 mg by mouth (Patient not taking: Reported on 11/27/2023)       Cholecalciferol (VITAMIN D-3 PO)  Take by mouth daily Pt unsure of dosage       fluticasone (FLOVENT HFA) 110 MCG/ACT inhaler Inhale 2 puffs into the lungs At Bedtime        Multiple Vitamin (MULTI-VITAMINS) TABS        Omega-3 Fatty Acids (OMEGA-3 FISH OIL PO) Take 1 g by mouth 2 times daily (with meals) 3 capsules       vitamin B complex with vitamin C (STRESS TAB) tablet Take 1 tablet by mouth daily          Physical Exam:  In-person physical exam could not be performed today in context of a Virtual Visit. Observed physical assessments made today by visualizing the patient by video link:  Vitals - Patient Reported  Pain Score: No Pain (0)             General/Constitutional: Generally appears well, not acutely ill.  HEENT: no scleral icterus, not jaundiced.  Respiratory: no labored breathing.  Musculoskeletal: appears to have full range of motion and adequate physical strength.  Skin: no jaundice, discoloration or other notable lesions.  Neurological: no evidence of tremors.  Psychiatric: no evident anxiety; fully alert and oriented with fluent speech.      The rest of a comprehensive physical examination is deferred due to nature of video visits.       Laboratory/Imaging Studies.  Prior to and including the day of this visit, I personally reviewed the recent imaging scans. I released the pertinent recent imaging results to [facility] in advance of this visit, and reviewed the summary verbatim and in lay language during today's call.    ASSESSMENT/PLAN:  49 year old man with pancreatic head insulinoma, who presented with hypoglycemic episodes for several years prior to diagnosis. He had Whipple surgery by Dr. Matthias Armenta on 12/08/2016 (pT2NM0).    He is 8 years out from resection of his pancreatic insulinoma.  He is doing well.  He has no objective evidence of tumor recurrence either radiographically or by biomarker symptoms.  He was very cognizant of the symptoms that led to diagnosis and he confirms very firmly that there is no evidence of the  symptoms. We discussed risks vs. Potential benefits of continued radiographic surveillance, vs. Cessation of surveillance and only performing imaging should symptoms re-arise.  By the end he provided verbal consent to another MRI abdomen one year time; at that time, that will joyce 9 (!) years since his surgery, and if no recurrence has occurred by that time, then it will be reasonable to suspend routine radiographic surveillance in absence of symptoms.  I advised him to let me know if he develops any concerning symptoms.    We reviewed in depth the two incidents that occurred in early and late summer of 2024, that in the absence of any slurred speech, or any other TIA like symptoms, likely represented some form of mild hypoglycemia in the setting of having had a Whipple procedure in 2016, and also in the context of having a relatively empty stomach.  I encouraged him to generally limit his alcohol intake and when he does have alcohol to eat something in advance; I think it would be prudent for him to also apprise Dr. Ortiz of those two incidents, and he and Lisa agreed to reach out to her team to let her know.  Otherwise, he will follow up with her as planned on February 26, and I offered continued surveillance, and he agreed; I will order MRI abdomen and CBC and CMP to be performed approximately one year time, and then he will follow up with me to review the results.    From a overall primary care and cancer screening perspective, I encouraged him to follow through in a standing order he has through his primary care team for colorectal cancer screening be a colonoscopy, which he has never done thus far. He confirmed that he will follow up with his primary care provider to move forward with that, any other age-appropriate noncancer related care.      VIRTUAL VISIT - DETAILS:    I have reviewed the note as documented above. This accurately captures the substance of my conversation with the patient.    Date of  call: November 18, 2024   Start of call: 10:19 am  End of call: 10:30 am    Provider location: [facility] (academic office)  Patient location: Home      Mode of Video Visit: Jeromy           I spent 11 minutes in consultation, including history and discussion with the patient including review of recent lab values and radiologic imaging results.  An additional 18 minutes was spent on the day of the visit, including reviewing pertinent medical notes and documentation from other physicians and APPs who have evaluated and treated this patient, pertinent lab values, pathology and imaging results, personal review of radiologic images, discussing the case with referring providers and/or nurse coordinator team, post-visit orders, and all post-visit documentation.    Brandan Hanley MD PhD                     Again, thank you for allowing me to participate in the care of your patient.        Sincerely,        Brandan Hanley MD

## 2024-11-18 NOTE — NURSING NOTE
Is the patient currently in the state of [place]? YES    Visit mode:VIDEO    If the visit is dropped, the patient can be reconnected by:VIDEO VISIT: Send to e-mail at: [email or web address]    Will anyone else be joining the visit? NO  (If patient encounters technical issues they should call [phone]    Are changes needed to the allergy or medication list? No    Are refills needed on medications prescribed by this physician? NO    Rooming Documentation:  Questionnaire(s) completed    Reason for visit: Video Visit (Follow Up )    Rachel MCNEAL      Niacinamide Pregnancy And Lactation Text: These medications are considered safe during pregnancy.

## 2025-06-15 ENCOUNTER — HEALTH MAINTENANCE LETTER (OUTPATIENT)
Age: 50
End: 2025-06-15